# Patient Record
Sex: FEMALE | Race: WHITE | Employment: FULL TIME | URBAN - METROPOLITAN AREA
[De-identification: names, ages, dates, MRNs, and addresses within clinical notes are randomized per-mention and may not be internally consistent; named-entity substitution may affect disease eponyms.]

---

## 2019-10-30 ENCOUNTER — HOSPITAL ENCOUNTER (INPATIENT)
Age: 24
LOS: 3 days | Discharge: HOME | DRG: 866 | End: 2019-11-02
Attending: STUDENT IN AN ORGANIZED HEALTH CARE EDUCATION/TRAINING PROGRAM | Admitting: HOSPITALIST
Payer: COMMERCIAL

## 2019-10-30 ENCOUNTER — APPOINTMENT (OUTPATIENT)
Dept: GENERAL RADIOLOGY | Age: 24
DRG: 866 | End: 2019-10-30
Attending: PHYSICIAN ASSISTANT
Payer: COMMERCIAL

## 2019-10-30 ENCOUNTER — APPOINTMENT (OUTPATIENT)
Dept: ULTRASOUND IMAGING | Age: 24
DRG: 866 | End: 2019-10-30
Attending: HOSPITALIST
Payer: COMMERCIAL

## 2019-10-30 DIAGNOSIS — N17.9 AKI (ACUTE KIDNEY INJURY) (HCC): Primary | ICD-10-CM

## 2019-10-30 DIAGNOSIS — E87.1 HYPONATREMIA: ICD-10-CM

## 2019-10-30 DIAGNOSIS — R50.9 ACUTE FEBRILE ILLNESS: ICD-10-CM

## 2019-10-30 DIAGNOSIS — D69.6 THROMBOCYTOPENIA (HCC): ICD-10-CM

## 2019-10-30 PROBLEM — R94.31 PROLONGED QT INTERVAL: Status: ACTIVE | Noted: 2019-10-30

## 2019-10-30 PROBLEM — E87.6 HYPOKALEMIA: Status: ACTIVE | Noted: 2019-10-30

## 2019-10-30 PROBLEM — E83.39 HYPOPHOSPHATEMIA: Status: ACTIVE | Noted: 2019-10-30

## 2019-10-30 PROBLEM — R65.10 SIRS (SYSTEMIC INFLAMMATORY RESPONSE SYNDROME) (HCC): Status: ACTIVE | Noted: 2019-10-30

## 2019-10-30 LAB
ALBUMIN SERPL-MCNC: 3.2 G/DL (ref 3.5–5)
ALBUMIN/GLOB SERPL: 0.8 {RATIO} (ref 1.1–2.2)
ALP SERPL-CCNC: 173 U/L (ref 45–117)
ALT SERPL-CCNC: 20 U/L (ref 12–78)
AMORPH CRY URNS QL MICRO: ABNORMAL
ANION GAP SERPL CALC-SCNC: 3 MMOL/L (ref 5–15)
ANION GAP SERPL CALC-SCNC: 7 MMOL/L (ref 5–15)
APPEARANCE UR: ABNORMAL
AST SERPL-CCNC: 22 U/L (ref 15–37)
B PERT DNA SPEC QL NAA+PROBE: NOT DETECTED
BACTERIA URNS QL MICRO: NEGATIVE /HPF
BASOPHILS # BLD: 0.1 K/UL (ref 0–0.1)
BASOPHILS NFR BLD: 1 % (ref 0–1)
BILIRUB SERPL-MCNC: 0.9 MG/DL (ref 0.2–1)
BILIRUB UR QL: NEGATIVE
BUN SERPL-MCNC: 26 MG/DL (ref 6–20)
BUN SERPL-MCNC: 34 MG/DL (ref 6–20)
BUN/CREAT SERPL: 22 (ref 12–20)
BUN/CREAT SERPL: 27 (ref 12–20)
C PNEUM DNA SPEC QL NAA+PROBE: NOT DETECTED
CALCIUM SERPL-MCNC: 8.6 MG/DL (ref 8.5–10.1)
CALCIUM SERPL-MCNC: 9.7 MG/DL (ref 8.5–10.1)
CHLORIDE SERPL-SCNC: 110 MMOL/L (ref 97–108)
CHLORIDE SERPL-SCNC: 98 MMOL/L (ref 97–108)
CO2 SERPL-SCNC: 25 MMOL/L (ref 21–32)
CO2 SERPL-SCNC: 26 MMOL/L (ref 21–32)
COLOR UR: ABNORMAL
COMMENT, HOLDF: NORMAL
CREAT SERPL-MCNC: 0.97 MG/DL (ref 0.55–1.02)
CREAT SERPL-MCNC: 1.56 MG/DL (ref 0.55–1.02)
DEPRECATED S PYO AG THROAT QL EIA: NEGATIVE
DIFFERENTIAL METHOD BLD: ABNORMAL
EOSINOPHIL # BLD: 0 K/UL (ref 0–0.4)
EOSINOPHIL NFR BLD: 0 % (ref 0–7)
EPITH CASTS URNS QL MICRO: ABNORMAL /LPF
ERYTHROCYTE [DISTWIDTH] IN BLOOD BY AUTOMATED COUNT: 12.4 % (ref 11.5–14.5)
FLUAV AG NPH QL IA: NEGATIVE
FLUAV H1 2009 PAND RNA SPEC QL NAA+PROBE: NOT DETECTED
FLUAV H1 RNA SPEC QL NAA+PROBE: NOT DETECTED
FLUAV H3 RNA SPEC QL NAA+PROBE: NOT DETECTED
FLUAV SUBTYP SPEC NAA+PROBE: NOT DETECTED
FLUBV AG NOSE QL IA: NEGATIVE
FLUBV RNA SPEC QL NAA+PROBE: NOT DETECTED
GLOBULIN SER CALC-MCNC: 4.1 G/DL (ref 2–4)
GLUCOSE SERPL-MCNC: 132 MG/DL (ref 65–100)
GLUCOSE SERPL-MCNC: 171 MG/DL (ref 65–100)
GLUCOSE UR STRIP.AUTO-MCNC: NEGATIVE MG/DL
HADV DNA SPEC QL NAA+PROBE: NOT DETECTED
HAV IGM SER QL: NONREACTIVE
HBV CORE IGM SER QL: NONREACTIVE
HBV SURFACE AG SER QL: <0.1 INDEX
HBV SURFACE AG SER QL: NEGATIVE
HCOV 229E RNA SPEC QL NAA+PROBE: NOT DETECTED
HCOV HKU1 RNA SPEC QL NAA+PROBE: NOT DETECTED
HCOV NL63 RNA SPEC QL NAA+PROBE: NOT DETECTED
HCOV OC43 RNA SPEC QL NAA+PROBE: NOT DETECTED
HCT VFR BLD AUTO: 35.7 % (ref 35–47)
HCV AB SERPL QL IA: NONREACTIVE
HCV COMMENT,HCGAC: NORMAL
HETEROPH AB BLD QL IA: NEGATIVE
HGB BLD-MCNC: 11.9 G/DL (ref 11.5–16)
HGB UR QL STRIP: ABNORMAL
HIV 1+2 AB+HIV1 P24 AG SERPL QL IA: NONREACTIVE
HIV12 RESULT COMMENT, HHIVC: NORMAL
HMPV RNA SPEC QL NAA+PROBE: NOT DETECTED
HPIV1 RNA SPEC QL NAA+PROBE: NOT DETECTED
HPIV2 RNA SPEC QL NAA+PROBE: NOT DETECTED
HPIV3 RNA SPEC QL NAA+PROBE: NOT DETECTED
HPIV4 RNA SPEC QL NAA+PROBE: NOT DETECTED
IMM GRANULOCYTES # BLD AUTO: 0 K/UL
IMM GRANULOCYTES NFR BLD AUTO: 0 %
KETONES UR QL STRIP.AUTO: NEGATIVE MG/DL
LACTATE BLD-SCNC: 1.11 MMOL/L (ref 0.4–2)
LDH SERPL L TO P-CCNC: 126 U/L (ref 81–246)
LEUKOCYTE ESTERASE UR QL STRIP.AUTO: ABNORMAL
LIPASE SERPL-CCNC: 53 U/L (ref 73–393)
LYMPHOCYTES # BLD: 0.3 K/UL (ref 0.8–3.5)
LYMPHOCYTES NFR BLD: 3 % (ref 12–49)
M PNEUMO DNA SPEC QL NAA+PROBE: NOT DETECTED
MAGNESIUM SERPL-MCNC: 1.9 MG/DL (ref 1.6–2.4)
MCH RBC QN AUTO: 28.8 PG (ref 26–34)
MCHC RBC AUTO-ENTMCNC: 33.3 G/DL (ref 30–36.5)
MCV RBC AUTO: 86.4 FL (ref 80–99)
MONOCYTES # BLD: 0.8 K/UL (ref 0–1)
MONOCYTES NFR BLD: 8 % (ref 5–13)
NEUTS BAND NFR BLD MANUAL: 4 % (ref 0–6)
NEUTS SEG # BLD: 9 K/UL (ref 1.8–8)
NEUTS SEG NFR BLD: 84 % (ref 32–75)
NITRITE UR QL STRIP.AUTO: NEGATIVE
NRBC # BLD: 0 K/UL (ref 0–0.01)
NRBC BLD-RTO: 0 PER 100 WBC
PH UR STRIP: 5 [PH] (ref 5–8)
PHOSPHATE SERPL-MCNC: 0.9 MG/DL (ref 2.6–4.7)
PHOSPHATE SERPL-MCNC: 2.7 MG/DL (ref 2.6–4.7)
PLATELET # BLD AUTO: 102 K/UL (ref 150–400)
PLATELET COMMENTS,PCOM: ABNORMAL
PMV BLD AUTO: 11.1 FL (ref 8.9–12.9)
POTASSIUM SERPL-SCNC: 3.2 MMOL/L (ref 3.5–5.1)
POTASSIUM SERPL-SCNC: 4.5 MMOL/L (ref 3.5–5.1)
PROT SERPL-MCNC: 7.3 G/DL (ref 6.4–8.2)
PROT UR STRIP-MCNC: 30 MG/DL
RBC # BLD AUTO: 4.13 M/UL (ref 3.8–5.2)
RBC #/AREA URNS HPF: ABNORMAL /HPF (ref 0–5)
RBC MORPH BLD: ABNORMAL
RSV RNA SPEC QL NAA+PROBE: NOT DETECTED
RV+EV RNA SPEC QL NAA+PROBE: NOT DETECTED
SAMPLES BEING HELD,HOLD: NORMAL
SODIUM SERPL-SCNC: 130 MMOL/L (ref 136–145)
SODIUM SERPL-SCNC: 139 MMOL/L (ref 136–145)
SP GR UR REFRACTOMETRY: 1.01 (ref 1–1.03)
SP1: NORMAL
SP2: NORMAL
SP3: NORMAL
UA: UC IF INDICATED,UAUC: ABNORMAL
UROBILINOGEN UR QL STRIP.AUTO: 2 EU/DL (ref 0.2–1)
WBC # BLD AUTO: 10.2 K/UL (ref 3.6–11)
WBC URNS QL MICRO: ABNORMAL /HPF (ref 0–4)

## 2019-10-30 PROCEDURE — 84100 ASSAY OF PHOSPHORUS: CPT

## 2019-10-30 PROCEDURE — 74011250636 HC RX REV CODE- 250/636: Performed by: PHYSICIAN ASSISTANT

## 2019-10-30 PROCEDURE — 74011250637 HC RX REV CODE- 250/637: Performed by: INTERNAL MEDICINE

## 2019-10-30 PROCEDURE — 87086 URINE CULTURE/COLONY COUNT: CPT

## 2019-10-30 PROCEDURE — 87880 STREP A ASSAY W/OPTIC: CPT

## 2019-10-30 PROCEDURE — 81001 URINALYSIS AUTO W/SCOPE: CPT

## 2019-10-30 PROCEDURE — 65270000029 HC RM PRIVATE

## 2019-10-30 PROCEDURE — 0099U RESPIRATORY PANEL,PCR,NASOPHARYNGEAL: CPT

## 2019-10-30 PROCEDURE — 85397 CLOTTING FUNCT ACTIVITY: CPT

## 2019-10-30 PROCEDURE — 87389 HIV-1 AG W/HIV-1&-2 AB AG IA: CPT

## 2019-10-30 PROCEDURE — 96361 HYDRATE IV INFUSION ADD-ON: CPT

## 2019-10-30 PROCEDURE — 83735 ASSAY OF MAGNESIUM: CPT

## 2019-10-30 PROCEDURE — 86777 TOXOPLASMA ANTIBODY: CPT

## 2019-10-30 PROCEDURE — 74011250637 HC RX REV CODE- 250/637: Performed by: PHYSICIAN ASSISTANT

## 2019-10-30 PROCEDURE — 36415 COLL VENOUS BLD VENIPUNCTURE: CPT

## 2019-10-30 PROCEDURE — 87804 INFLUENZA ASSAY W/OPTIC: CPT

## 2019-10-30 PROCEDURE — 80053 COMPREHEN METABOLIC PANEL: CPT

## 2019-10-30 PROCEDURE — 86308 HETEROPHILE ANTIBODY SCREEN: CPT

## 2019-10-30 PROCEDURE — 99285 EMERGENCY DEPT VISIT HI MDM: CPT

## 2019-10-30 PROCEDURE — 96375 TX/PRO/DX INJ NEW DRUG ADDON: CPT

## 2019-10-30 PROCEDURE — 86664 EPSTEIN-BARR NUCLEAR ANTIGEN: CPT

## 2019-10-30 PROCEDURE — 74011250637 HC RX REV CODE- 250/637: Performed by: HOSPITALIST

## 2019-10-30 PROCEDURE — 74011000258 HC RX REV CODE- 258: Performed by: PHYSICIAN ASSISTANT

## 2019-10-30 PROCEDURE — 83605 ASSAY OF LACTIC ACID: CPT

## 2019-10-30 PROCEDURE — 83615 LACTATE (LD) (LDH) ENZYME: CPT

## 2019-10-30 PROCEDURE — 76705 ECHO EXAM OF ABDOMEN: CPT

## 2019-10-30 PROCEDURE — 87040 BLOOD CULTURE FOR BACTERIA: CPT

## 2019-10-30 PROCEDURE — 87070 CULTURE OTHR SPECIMN AEROBIC: CPT

## 2019-10-30 PROCEDURE — 80074 ACUTE HEPATITIS PANEL: CPT

## 2019-10-30 PROCEDURE — 74011000258 HC RX REV CODE- 258: Performed by: HOSPITALIST

## 2019-10-30 PROCEDURE — 74011250636 HC RX REV CODE- 250/636: Performed by: HOSPITALIST

## 2019-10-30 PROCEDURE — 87076 CULTURE ANAEROBE IDENT EACH: CPT

## 2019-10-30 PROCEDURE — 96374 THER/PROPH/DIAG INJ IV PUSH: CPT

## 2019-10-30 PROCEDURE — 71046 X-RAY EXAM CHEST 2 VIEWS: CPT

## 2019-10-30 PROCEDURE — 83690 ASSAY OF LIPASE: CPT

## 2019-10-30 PROCEDURE — 87185 SC STD ENZYME DETCJ PER NZM: CPT

## 2019-10-30 PROCEDURE — 74011000250 HC RX REV CODE- 250: Performed by: HOSPITALIST

## 2019-10-30 PROCEDURE — 85025 COMPLETE CBC W/AUTO DIFF WBC: CPT

## 2019-10-30 PROCEDURE — 74011250636 HC RX REV CODE- 250/636: Performed by: INTERNAL MEDICINE

## 2019-10-30 PROCEDURE — 93005 ELECTROCARDIOGRAM TRACING: CPT

## 2019-10-30 PROCEDURE — 87536 HIV-1 QUANT&REVRSE TRNSCRPJ: CPT

## 2019-10-30 RX ORDER — KETOROLAC TROMETHAMINE 30 MG/ML
15 INJECTION, SOLUTION INTRAMUSCULAR; INTRAVENOUS
Status: COMPLETED | OUTPATIENT
Start: 2019-10-30 | End: 2019-10-30

## 2019-10-30 RX ORDER — ACETAMINOPHEN 500 MG
1000 TABLET ORAL
Status: COMPLETED | OUTPATIENT
Start: 2019-10-30 | End: 2019-10-30

## 2019-10-30 RX ORDER — OXYCODONE HYDROCHLORIDE 5 MG/1
5 TABLET ORAL
Status: DISCONTINUED | OUTPATIENT
Start: 2019-10-30 | End: 2019-11-01

## 2019-10-30 RX ORDER — METHYLPREDNISOLONE 4 MG/1
TABLET ORAL
COMMUNITY
End: 2019-11-02

## 2019-10-30 RX ORDER — SODIUM CHLORIDE 0.9 % (FLUSH) 0.9 %
5-10 SYRINGE (ML) INJECTION AS NEEDED
Status: DISCONTINUED | OUTPATIENT
Start: 2019-10-30 | End: 2019-11-02 | Stop reason: HOSPADM

## 2019-10-30 RX ORDER — IBUPROFEN 200 MG
200 TABLET ORAL
COMMUNITY
End: 2019-11-14

## 2019-10-30 RX ORDER — ACETAMINOPHEN 325 MG/1
650 TABLET ORAL
Status: DISCONTINUED | OUTPATIENT
Start: 2019-10-30 | End: 2019-11-02 | Stop reason: HOSPADM

## 2019-10-30 RX ORDER — TRAMADOL HYDROCHLORIDE 50 MG/1
50 TABLET ORAL
COMMUNITY
End: 2019-11-02

## 2019-10-30 RX ORDER — SODIUM CHLORIDE 0.9 % (FLUSH) 0.9 %
5-40 SYRINGE (ML) INJECTION EVERY 8 HOURS
Status: DISCONTINUED | OUTPATIENT
Start: 2019-10-30 | End: 2019-11-02 | Stop reason: HOSPADM

## 2019-10-30 RX ORDER — ONDANSETRON 2 MG/ML
4 INJECTION INTRAMUSCULAR; INTRAVENOUS
Status: DISCONTINUED | OUTPATIENT
Start: 2019-10-30 | End: 2019-10-30

## 2019-10-30 RX ORDER — SODIUM CHLORIDE 9 MG/ML
30 INJECTION, SOLUTION INTRAVENOUS ONCE
Status: DISCONTINUED | OUTPATIENT
Start: 2019-10-30 | End: 2019-10-30

## 2019-10-30 RX ORDER — SODIUM CHLORIDE 0.9 % (FLUSH) 0.9 %
5-40 SYRINGE (ML) INJECTION AS NEEDED
Status: DISCONTINUED | OUTPATIENT
Start: 2019-10-30 | End: 2019-11-02 | Stop reason: HOSPADM

## 2019-10-30 RX ORDER — OSELTAMIVIR PHOSPHATE 75 MG/1
75 CAPSULE ORAL
COMMUNITY
End: 2019-11-02

## 2019-10-30 RX ORDER — SODIUM CHLORIDE 9 MG/ML
150 INJECTION, SOLUTION INTRAVENOUS CONTINUOUS
Status: DISCONTINUED | OUTPATIENT
Start: 2019-10-30 | End: 2019-11-01

## 2019-10-30 RX ORDER — BUTALBITAL, ACETAMINOPHEN AND CAFFEINE 50; 325; 40 MG/1; MG/1; MG/1
1 TABLET ORAL ONCE
Status: COMPLETED | OUTPATIENT
Start: 2019-10-31 | End: 2019-10-31

## 2019-10-30 RX ORDER — NORETHINDRONE ACETATE AND ETHINYL ESTRADIOL 1MG-20(21)
1 KIT ORAL DAILY
COMMUNITY
End: 2019-11-14

## 2019-10-30 RX ORDER — POTASSIUM CHLORIDE 750 MG/1
40 TABLET, FILM COATED, EXTENDED RELEASE ORAL
Status: COMPLETED | OUTPATIENT
Start: 2019-10-30 | End: 2019-10-30

## 2019-10-30 RX ORDER — SODIUM CHLORIDE 9 MG/ML
700 INJECTION, SOLUTION INTRAVENOUS ONCE
Status: COMPLETED | OUTPATIENT
Start: 2019-10-30 | End: 2019-10-30

## 2019-10-30 RX ADMIN — CEFTRIAXONE 1 G: 1 INJECTION, POWDER, FOR SOLUTION INTRAMUSCULAR; INTRAVENOUS at 03:46

## 2019-10-30 RX ADMIN — PROMETHAZINE HYDROCHLORIDE 25 MG: 25 INJECTION INTRAMUSCULAR; INTRAVENOUS at 01:09

## 2019-10-30 RX ADMIN — OXYCODONE HYDROCHLORIDE 5 MG: 5 TABLET ORAL at 17:03

## 2019-10-30 RX ADMIN — SODIUM CHLORIDE 700 ML/HR: 900 INJECTION, SOLUTION INTRAVENOUS at 02:15

## 2019-10-30 RX ADMIN — ACETAMINOPHEN 1000 MG: 500 TABLET ORAL at 00:42

## 2019-10-30 RX ADMIN — ACETAMINOPHEN 650 MG: 325 TABLET, FILM COATED ORAL at 08:36

## 2019-10-30 RX ADMIN — ACETAMINOPHEN 650 MG: 325 TABLET, FILM COATED ORAL at 12:24

## 2019-10-30 RX ADMIN — OXYCODONE HYDROCHLORIDE 5 MG: 5 TABLET ORAL at 21:43

## 2019-10-30 RX ADMIN — POTASSIUM CHLORIDE 40 MEQ: 750 TABLET, FILM COATED, EXTENDED RELEASE ORAL at 03:46

## 2019-10-30 RX ADMIN — CEFEPIME HYDROCHLORIDE 2 G: 2 INJECTION, POWDER, FOR SOLUTION INTRAVENOUS at 12:16

## 2019-10-30 RX ADMIN — Medication 10 ML: at 21:43

## 2019-10-30 RX ADMIN — CEFEPIME HYDROCHLORIDE 2 G: 2 INJECTION, POWDER, FOR SOLUTION INTRAVENOUS at 22:27

## 2019-10-30 RX ADMIN — ACETAMINOPHEN 650 MG: 325 TABLET, FILM COATED ORAL at 22:26

## 2019-10-30 RX ADMIN — SODIUM CHLORIDE: 900 INJECTION, SOLUTION INTRAVENOUS at 04:19

## 2019-10-30 RX ADMIN — DIBASIC SODIUM PHOSPHATE, MONOBASIC POTASSIUM PHOSPHATE AND MONOBASIC SODIUM PHOSPHATE 2 TABLET: 852; 155; 130 TABLET ORAL at 04:20

## 2019-10-30 RX ADMIN — SODIUM CHLORIDE 1000 ML: 900 INJECTION, SOLUTION INTRAVENOUS at 01:10

## 2019-10-30 RX ADMIN — SODIUM CHLORIDE 150 ML/HR: 900 INJECTION, SOLUTION INTRAVENOUS at 11:05

## 2019-10-30 RX ADMIN — SODIUM CHLORIDE 125 ML/HR: 900 INJECTION, SOLUTION INTRAVENOUS at 03:46

## 2019-10-30 RX ADMIN — KETOROLAC TROMETHAMINE 15 MG: 30 INJECTION, SOLUTION INTRAMUSCULAR at 02:15

## 2019-10-30 NOTE — CONSULTS
[facility] at [address], [address] [place]: [phone]  F: [phone]    Reason for Visit:   Wiley Emerson is a 30 y.o. female who is seen in consultation at the request of Dr. Idris Shepherd for evaluation of Schistzocyte. Treatment History:   · None    History of Present Illness: The pt is a very pleasant 29 yo who presented to the ER with a 5 day Hx of fever, chills, feeling bad, and not eating. She is complaining of some right upper quadrant pain as well. Pt has not been out of the country and no tick exposure, she did go camping 2 months ago. No one else is sick. The smear was read as having large platelet and schistocytes. I reviewed the side from the lab and saw one schistocyte in 40 HPF. She has normal RBC morphology with some polikocytosis. The Platelets are decreased in number but normal.  The WBC's have prominent granules with vacuoles and occasional Dohle bodies. History reviewed. No pertinent past medical history. History reviewed. No pertinent surgical history. Social History     Tobacco Use    Smoking status: Never Smoker   Substance Use Topics    Alcohol use: Not on file      No family history on file. Current Facility-Administered Medications   Medication Dose Route Frequency    sodium chloride (NS) flush 5-10 mL  5-10 mL IntraVENous PRN    cefTRIAXone (ROCEPHIN) 1 g in 0.9% sodium chloride (MBP/ADV) 50 mL  1 g IntraVENous NOW    sodium chloride (NS) flush 5-40 mL  5-40 mL IntraVENous Q8H    sodium chloride (NS) flush 5-40 mL  5-40 mL IntraVENous PRN    acetaminophen (TYLENOL) tablet 650 mg  650 mg Oral Q4H PRN    potassium phosphate 30 mmol in 0.9% sodium chloride 250 mL infusion   IntraVENous ONCE    0.9% sodium chloride infusion  125 mL/hr IntraVENous CONTINUOUS    phosphorus (K PHOS NEUTRAL) 250 mg tablet 2 Tab  2 Tab Oral NOW     No current outpatient medications on file.       Allergies   Allergen Reactions    Sulfa (Sulfonamide Antibiotics) Hives        Review of Systems: A complete review of systems was obtained, negative except as described above. Physical Exam:     Visit Vitals  BP 96/63   Pulse 87   Temp 99.5 °F (37.5 °C)   Resp 24   Ht 5' 2\" (1.575 m)   Wt 126 lb (57.2 kg)   LMP 10/09/2019   SpO2 99%   BMI 23.05 kg/m²     ECOG PS: 2  General: she feels miserable  Eyes: PERRLA, anicteric sclerae  HENT: Atraumatic, OP clear  Neck: Supple  Lymphatic: No cervical, or supraclavicular adenopathy  Respiratory: CTAB, normal respiratory effort  CV: Normal rate, regular rhythm, no murmurs, no peripheral edema  GI: Soft, tender RUQ , nondistended, no masses, no hepatomegaly, no splenomegaly  MS: Digits without clubbing or cyanosis. Skin: No rashes, ecchymoses, or petechiae. Normal temperature, turgor, and texture. Psych: Alert, oriented, appropriate affect, normal judgment/insight    Results:     Lab Results   Component Value Date/Time    WBC 10.2 10/30/2019 12:46 AM    HGB 11.9 10/30/2019 12:46 AM    HCT 35.7 10/30/2019 12:46 AM    PLATELET 576 (L) 62/06/4249 12:46 AM    MCV 86.4 10/30/2019 12:46 AM    ABS. NEUTROPHILS 9.0 (H) 10/30/2019 12:46 AM     Lab Results   Component Value Date/Time    Sodium 130 (L) 10/30/2019 12:46 AM    Potassium 3.2 (L) 10/30/2019 12:46 AM    Chloride 98 10/30/2019 12:46 AM    CO2 25 10/30/2019 12:46 AM    Glucose 132 (H) 10/30/2019 12:46 AM    BUN 34 (H) 10/30/2019 12:46 AM    Creatinine 1.56 (H) 10/30/2019 12:46 AM    GFR est AA 49 (L) 10/30/2019 12:46 AM    GFR est non-AA 40 (L) 10/30/2019 12:46 AM    Calcium 9.7 10/30/2019 12:46 AM     Lab Results   Component Value Date/Time    Bilirubin, total 0.9 10/30/2019 12:46 AM    ALT (SGPT) 20 10/30/2019 12:46 AM    AST (SGOT) 22 10/30/2019 12:46 AM    Alk.  phosphatase 173 (H) 10/30/2019 12:46 AM    Protein, total 7.3 10/30/2019 12:46 AM    Albumin 3.2 (L) 10/30/2019 12:46 AM    Globulin 4.1 (H) 10/30/2019 12:46 AM         Records reviewed and summarized above. CXR normal 10/30/19     Radiology report(s) reviewed above. Assessment:   1) probable bacterial infection with RUQ pain    2) no significant schistocytes but with toxic granules in the [facility] with vacuoles and Dohle bodies the latter suggest infection    Plan:     · It is reasonable to check an LDH and us TS13 but the probability of TTP is very low. I appreciate the opportunity to participate in Ms. Calli Kenney's care.     Signed By: Juventino Kathleen MD

## 2019-10-30 NOTE — H&P
HISTORY AND PHYSICAL      PCP: None  History source: the patient      CC: fever and malaise      HPI: 30 y.o lady with no known medical problems who presents with fever and malaise. Symptoms began five days ago. She developed a sore throat, went to an urgent care clinic, had a negative rapid flu and strep test, but was prescribed Tamiflu due to high concerns for the flu. Her symptoms worsened in severity and she began having a headache and body aches. She went to another ED three days ago and was told she may have mono, and was prescribed a methylprednisolone taper. This greatly improved her symptoms, but yesterday she started feeling poorly again. She's been having fevers up to 102, anorexia, nausea. Her headache has resolved since she's come to the ED, and she denies neck stiffness or photophobia. She denies rash, cough, diarrhea, dysuria, recent travel       PMH/PSH:  History reviewed. No pertinent past medical history. History reviewed. No pertinent surgical history. Home meds: On birth control - specific drug? Allergies: Allergies   Allergen Reactions    Sulfa (Sulfonamide Antibiotics) Hives       FH:  No pertinent family history    SH:  Denies tobacco, alcohol, or illicit drug use. Has multiple pets, dogs. ROS: A comprehensive review of systems was negative except for that written in the HPI.       PHYSICAL EXAM:  Visit Vitals  BP 90/54 (BP 1 Location: Right arm, BP Patient Position: At rest)   Pulse 95   Temp 99.5 °F (37.5 °C)   Resp 20   Ht 5' 2\" (1.575 m)   Wt 57.2 kg (126 lb)   LMP 10/09/2019   SpO2 98%   BMI 23.05 kg/m²       Gen: appears ill  HEENT: anicteric sclerae, there is mild pharyngeal erythema, MM moist  Neck: supple, trachea midline, there is tender anterior cervical lymphadenopathy  Heart: RRR, no MRG, no JVD, no peripheral edema  Lungs: CTA b/l, non-labored respirations  Abd: soft, mild RUQ tenderness, ND, BS+  Extr: warm  Skin: dry, no rash  Neuro: CN II-XII grossly intact, normal speech, moves all extremities  Psych: normal mood, appropriate affect      Labs/Imaging:  Recent Results (from the past 24 hour(s))   EKG, 12 LEAD, INITIAL    Collection Time: 10/30/19 12:27 AM   Result Value Ref Range    Ventricular Rate 115 BPM    Atrial Rate 115 BPM    P-R Interval 104 ms    QRS Duration 72 ms    Q-T Interval 456 ms    QTC Calculation (Bezet) 630 ms    Calculated R Axis 29 degrees    Calculated T Axis 59 degrees    Diagnosis       Sinus tachycardia with short ME  Nonspecific T wave abnormality  Prolonged QT  No previous ECGs available     POC LACTIC ACID    Collection Time: 10/30/19 12:38 AM   Result Value Ref Range    Lactic Acid (POC) 1.11 0.40 - 2.00 mmol/L   INFLUENZA A & B AG (RAPID TEST)    Collection Time: 10/30/19 12:40 AM   Result Value Ref Range    Influenza A Antigen NEGATIVE  NEG      Influenza B Antigen NEGATIVE  NEG     MONONUCLEOSIS SCREEN    Collection Time: 10/30/19 12:40 AM   Result Value Ref Range    Mononucleosis screen NEGATIVE  NEG     MAGNESIUM    Collection Time: 10/30/19 12:46 AM   Result Value Ref Range    Magnesium 1.9 1.6 - 2.4 mg/dL   PHOSPHORUS    Collection Time: 10/30/19 12:46 AM   Result Value Ref Range    Phosphorus 0.9 (LL) 2.6 - 4.7 MG/DL   SAMPLES BEING HELD    Collection Time: 10/30/19 12:46 AM   Result Value Ref Range    SAMPLES BEING HELD 1RED,1BLUE,1RED     COMMENT        Add-on orders for these samples will be processed based on acceptable specimen integrity and analyte stability, which may vary by analyte.    METABOLIC PANEL, COMPREHENSIVE    Collection Time: 10/30/19 12:46 AM   Result Value Ref Range    Sodium 130 (L) 136 - 145 mmol/L    Potassium 3.2 (L) 3.5 - 5.1 mmol/L    Chloride 98 97 - 108 mmol/L    CO2 25 21 - 32 mmol/L    Anion gap 7 5 - 15 mmol/L    Glucose 132 (H) 65 - 100 mg/dL    BUN 34 (H) 6 - 20 MG/DL    Creatinine 1.56 (H) 0.55 - 1.02 MG/DL    BUN/Creatinine ratio 22 (H) 12 - 20      GFR est AA 49 (L) >60 ml/min/1.73m2    GFR est non-AA 40 (L) >60 ml/min/1.73m2    Calcium 9.7 8.5 - 10.1 MG/DL    Bilirubin, total 0.9 0.2 - 1.0 MG/DL    ALT (SGPT) 20 12 - 78 U/L    AST (SGOT) 22 15 - 37 U/L    Alk. phosphatase 173 (H) 45 - 117 U/L    Protein, total 7.3 6.4 - 8.2 g/dL    Albumin 3.2 (L) 3.5 - 5.0 g/dL    Globulin 4.1 (H) 2.0 - 4.0 g/dL    A-G Ratio 0.8 (L) 1.1 - 2.2     CBC WITH AUTOMATED DIFF    Collection Time: 10/30/19 12:46 AM   Result Value Ref Range    WBC 10.2 3.6 - 11.0 K/uL    RBC 4.13 3.80 - 5.20 M/uL    HGB 11.9 11.5 - 16.0 g/dL    HCT 35.7 35.0 - 47.0 %    MCV 86.4 80.0 - 99.0 FL    MCH 28.8 26.0 - 34.0 PG    MCHC 33.3 30.0 - 36.5 g/dL    RDW 12.4 11.5 - 14.5 %    PLATELET 685 (L) 482 - 400 K/uL    MPV 11.1 8.9 - 12.9 FL    NRBC 0.0 0  WBC    ABSOLUTE NRBC 0.00 0.00 - 0.01 K/uL    NEUTROPHILS 84 (H) 32 - 75 %    BAND NEUTROPHILS 4 0 - 6 %    LYMPHOCYTES 3 (L) 12 - 49 %    MONOCYTES 8 5 - 13 %    EOSINOPHILS 0 0 - 7 %    BASOPHILS 1 0 - 1 %    IMMATURE GRANULOCYTES 0 %    ABS. NEUTROPHILS 9.0 (H) 1.8 - 8.0 K/UL    ABS. LYMPHOCYTES 0.3 (L) 0.8 - 3.5 K/UL    ABS. MONOCYTES 0.8 0.0 - 1.0 K/UL    ABS. EOSINOPHILS 0.0 0.0 - 0.4 K/UL    ABS. BASOPHILS 0.1 0.0 - 0.1 K/UL    ABS. IMM. GRANS. 0.0 K/UL    DF MANUAL      PLATELET COMMENTS Large Platelets      RBC COMMENTS SCHISTOCYTES  1+       LIPASE    Collection Time: 10/30/19 12:46 AM   Result Value Ref Range    Lipase 53 (L) 73 - 393 U/L       Recent Labs     10/30/19  0046   WBC 10.2   HGB 11.9   HCT 35.7   *     Recent Labs     10/30/19  0046   *   K 3.2*   CL 98   CO2 25   BUN 34*   CREA 1.56*   *   CA 9.7   MG 1.9   PHOS 0.9*     Recent Labs     10/30/19  0046   SGOT 22   ALT 20   *   TBILI 0.9   TP 7.3   ALB 3.2*   GLOB 4.1*   LPSE 53*       No results for input(s): CPK, CKNDX, TROIQ in the last 72 hours. No lab exists for component: CPKMB    No results for input(s): INR, PTP, APTT, INREXT in the last 72 hours.      No results for input(s): PH, PCO2, PO2 in the last 72 hours. Xr Chest Pa Lat    Result Date: 10/30/2019  IMPRESSION: No acute process. Assessment & Plan:     SIRS of unclear etiology: concerning for sepsis. Recent URI, per the pt rapid strep testing was negative. Exam notable for tender anterior cervical LAD. PBS notes schistocytes but TTP unlikely (see below)  -admit to intermediate care  -IV fluids  -blood cultures, UA pending  -respiratory viral PCR, throat culture with strep A screen  -mono screen and rapid flu negative  -check HIV Ab and PCR - obtained the patient's verbal consent  -RUQ US given RUQ pain; consider CT of the abdomen  -received a dose of IV ceftriaxone in the ED.  Will place on IV cefepime for now until further tests result    Thrombocytopenia: schistocytes noted on smear  -check LDH, [id number] activity  -d/w on-call heme/onc Dr. Luis F Avila who evaluated the smear - TTP unlikely, schistocytes minimal    GISELLA: pt endorses frequent NSAID use over the past few days  -monitor w/ IV fluids    Hypokalemia:  -replete    Hypophosphatemia:  -replete    Hyponatremia: likely due to volume depletion    Prolonged QTc incidentally noted    DVT ppx: SCDs  Code status: full  Disposition: home when ready    Signed By: Jack Sahni MD     October 30, 2019

## 2019-10-30 NOTE — PROGRESS NOTES
Admission Medication Reconciliation:    Comments/Recommendations:     Updated PTA meds/reviewed patient's allergies. Medication history obtained from the patient and her mother. Allergies: Reviewed. Medication changes (since last review): Added  Junel (birth control)  Tamiflu x 1 dose ~10/25/19 - subsequently stopped by new provider seen  Medrol dose pack - on Day 4  Tramadol PRN  Advil PRN    Please call  with any immediate questions regarding this medication reconciliation, or the main inpatient pharmacy at [facility] to be directed to the clinical pharmacist covering the patient's care after admitted. Thank you for allowing me to participate in this patient's care. Magda Aquino, Pharmacist     [facility] data reflects medications filled and processed through the patient's insurance, however   this data does NOT capture whether the medication was picked up or is currently being taken by the patient. Prior to Admission Medications:   Prior to Admission Medications   Prescriptions Last Dose Informant Patient Reported? Taking?   ibuprofen (ADVIL) 200 mg tablet 10/29/2019 at Unknown time  Yes Yes   Sig: Take 200 mg by mouth every six (6) hours as needed for Pain. methylPREDNISolone (MEDROL, SAHIL,) 4 mg tablet 10/29/2019 at Unknown time  Yes Yes   Sig: Specific Days and Specific Times. (Started 6 day Medrol Dose Pack ~ 10/27/19?)   norethindrone-ethinyl estradiol (JUNEL FE , ,) 1 mg-20 mcg (21)/75 mg (7) tab 10/29/2019 at Unknown time  Yes Yes   Sig: Take 1 Tab by mouth daily. oseltamivir (TAMIFLU) 75 mg capsule 10/25/2019  Yes Yes   Si mg. Took 1 dose ~10/25/19?   traMADol (ULTRAM) 50 mg tablet 10/29/2019 at Unknown time  Yes Yes   Sig: Take 50 mg by mouth every six (6) hours as needed for Pain.       Facility-Administered Medications: None       Allergies:  Sulfa (sulfonamide antibiotics)  Chief Complaint for this Admission:    Chief Complaint   Patient presents with Flu Like Symptoms    Sore Throat    Back Pain     Significant PMH/Disease States: History reviewed. No pertinent past medical history.

## 2019-10-30 NOTE — ED PROVIDER NOTES
This is a 23-year-old  female previously healthy, presenting to the emergency department with complaint of a 3 to 4-day history of generalized body aches, fatigue, fever and initially sore throat. She was seen on day 1 of illness at an urgent care facility and had a negative strep and flu test.  She was started presumptively on Tamiflu for clinical flu. She then progressed to feel worse the next day and was seen at an outside ED and was prescribed steroids for presumed monopolar she reports in the past 2 days has started to have associated nausea, vomiting, tonight developed head pressure localized to the back part of the head, bilateral ear pressure; rt>lt, lower back pain, and poor appetite. She has been medicating at home with ibuprofen, last dose at 9 PM, 800 mg p.o. she also tried taking Zofran but vomited shortly after taking the medicine. She reports drinking plenty of fluids. She denies any significant solid intake. She denies any ill contacts with similar. She works at a [employer] center. She denies any recent travel. No associated chest pain, shortness of breath, runny nose, sneezing, abdominal pain, diarrhea or urinary complaint. History reviewed. No pertinent past medical history. History reviewed. No pertinent surgical history. No family history on file.     Social History     Socioeconomic History    Marital status: SINGLE     Spouse name: Not on file    Number of children: Not on file    Years of education: Not on file    Highest education level: Not on file   Occupational History    Not on file   Social Needs    Financial resource strain: Not on file    Food insecurity:     Worry: Not on file     Inability: Not on file    Transportation needs:     Medical: Not on file     Non-medical: Not on file   Tobacco Use    Smoking status: Never Smoker   Substance and Sexual Activity    Alcohol use: Not on file    Drug use: Not on file    Sexual activity: Not on file Lifestyle    Physical activity:     Days per week: Not on file     Minutes per session: Not on file    Stress: Not on file   Relationships    Social connections:     Talks on phone: Not on file     Gets together: Not on file     Attends [employer] service: Not on file     Active member of club or organization: Not on file     Attends meetings of clubs or organizations: Not on file     Relationship status: Not on file    Intimate partner violence:     Fear of current or ex partner: Not on file     Emotionally abused: Not on file     Physically abused: Not on file     Forced sexual activity: Not on file   Other Topics Concern    Not on file   Social History Narrative    Not on file         ALLERGIES: Sulfa (sulfonamide antibiotics)    Review of Systems   Constitutional: Positive for activity change, appetite change, chills and fever. HENT: Positive for ear discharge (pressure) and sore throat. Negative for congestion, rhinorrhea and sneezing. Respiratory: Positive for cough. Gastrointestinal: Positive for nausea. Musculoskeletal: Positive for back pain. Neurological: Positive for dizziness and headaches. Vitals:    10/30/19 0008 10/30/19 0031 10/30/19 0045 10/30/19 0050   BP: 105/68 113/67 99/59    Pulse: (!) 152  (!) 112    Resp: 18  18    Temp: (!) 102.2 °F (39 °C)      SpO2: 96% 97% 97%    Weight:    57.2 kg (126 lb)   Height:    5' 2\" (1.575 m)            Physical Exam   Constitutional: She is oriented to person, place, and time. She appears well-developed and well-nourished. No distress. Mildly ill appearing  female in NAD   HENT:   Head: Normocephalic and atraumatic. Right Ear: Tympanic membrane and ear canal normal. No drainage or swelling. Left Ear: Tympanic membrane, external ear and ear canal normal. No drainage or swelling. Mouth/Throat: Uvula is midline and mucous membranes are normal. No oral lesions. No uvula swelling.  Posterior oropharyngeal edema and posterior oropharyngeal erythema present. No oropharyngeal exudate or tonsillar abscesses. Tonsils are 3+ on the right. Tonsils are 3+ on the left. No tonsillar exudate. Eyes: Pupils are equal, round, and reactive to light. Conjunctivae are normal.   Neck: Normal range of motion. Neck supple. No meningeal irritation   Cardiovascular: Normal rate, regular rhythm and normal heart sounds. Pulmonary/Chest: Effort normal. No respiratory distress. She has no wheezes. Abdominal: Soft. Bowel sounds are normal. She exhibits no distension. There is no tenderness. There is no rebound. Musculoskeletal: Normal range of motion. Neurological: She is alert and oriented to person, place, and time. Skin: Skin is warm and dry. No ecchymosis, no laceration and no lesion noted. Nursing note and vitals reviewed. MDM  Number of Diagnoses or Management Options  Diagnosis management comments: 12-year-old  female with complaint of continued fatigue, decreased appetite, headache, sore throat, nausea and back pain. She appears mildly ill on exam.  Mild oropharyngeal erythema without uvular shift. Her neck is supple without any evidence of meningeal irritation on exam.  Patient is noted to be febrile with associated tachycardia. Concern for possible clinical flu versus pneumonia versus pyelonephritis versus mononucleosis amongst others. Plan  EKG  Troponin  CBC  CMP  Lipase  Ua  Magnesium  Phosphorus  IV fluid bolus  Phenergan  Tylenol  Monitor       Amount and/or Complexity of Data Reviewed  Clinical lab tests: ordered and reviewed  Tests in the radiology section of CPT®: ordered and reviewed  Independent visualization of images, tracings, or specimens: yes           Procedures          Progress note      EKG interpretation: (  Rhythm: sinus tachycardia; and regular . Rate (approx.): 115; Axis: normal; P wave: prolonged; QRS interval: normal ; ST/T wave: normal; in  Leads.  [facility]      Conferred with  Puja Hopkins, hospitalist via [employer]. He agrees to eval patient for admission.  [facility] [place]

## 2019-10-30 NOTE — PROGRESS NOTES
Hospitalist Progress Note  Fannin Cockayne, MD  Answering service: [phone] from in house phone        Date of Service:  10/30/2019  NAME:  Eloy Silva  :  1995  MRN:  [id number]      Admission Summary:   Admitted with 1 week of fever,malaise. Sore throat    Interval history / Subjective:       10/30. CT suggest hepatosplenomegally. Gallstone and sludge without obst  surg to comment  By end of day, hep screen neg, HIV screen neg. EBV viral studies pending. Pt was mono neg PTA      Assessment & Plan:     Viral illness, likely EBV pendng studies with sore thoart, hepatosplenomegally, fever,   Gall stone/sludge w/o obstu for hida scan  Code status: full   DVT prophylaxis: Lomená 1965 discussed with: Patient/Family and Nurse  Disposition: Home w/Family and TBD     Hospital Problems  Never Reviewed          Codes Class Noted POA    SIRS (systemic inflammatory response syndrome) ([facility] ICD-10-CM: R65.10  ICD-9-CM: 995.90  10/30/2019 Yes        Prolonged QT interval ICD-10-CM: R94.31  ICD-9-CM: 794.31  10/30/2019 Yes        GISELLA (acute kidney injury) ([facility] ICD-10-CM: N17.9  ICD-9-CM: 584.9  10/30/2019 Yes        Thrombocytopenia ([facility] ICD-10-CM: D69.6  ICD-9-CM: 287.5  10/30/2019 Yes        Hypokalemia ICD-10-CM: E87.6  ICD-9-CM: 276.8  10/30/2019 Yes        Hypophosphatemia ICD-10-CM: E83.39  ICD-9-CM: 275.3  10/30/2019 Yes        Hyponatremia ICD-10-CM: E87.1  ICD-9-CM: 276.1  10/30/2019 Yes                Review of Systems:   Pertinent items are noted in HPI. Vital Signs:    Last 24hrs VS reviewed since prior progress note.  Most recent are:  Visit Vitals  BP 97/58   Pulse 72   Temp 98.3 °F (36.8 °C)   Resp 16   Ht 5' 2\" (1.575 m)   Wt 57.2 kg (126 lb)   SpO2 100%   BMI 23.05 kg/m²       No intake or output data in the 24 hours ending 10/30/19 1033     Physical Examination:             Constitutional:  No acute distress, cooperative, pleasant    ENT:  tender submandibulare area ;else Oral mucous moist, oropharynx benign. And TM's clear    Resp:  CTA bilaterally. No wheezing/rhonchi/rales. No accessory muscle use   CV:  Regular rhythm, normal rate, no murmurs, gallops, rubs    GI:  Soft, non distended, upper rt /left  Tender and c/c hepatosplenomegally . normoactive bowel sounds,      Musculoskeletal:  No edema, warm, 2+ pulses throughout    Neurologic:  Moves all extremities. AAOx3, CN II-XII reviewed     Psych:  Good insight, Not anxious nor agitated. Skin:  Good turgor, no rashes or ulcers       Data Review:    Review and/or order of clinical lab test      Labs:     Recent Labs     10/30/19  0046   WBC 10.2   HGB 11.9   HCT 35.7   *     Recent Labs     10/30/19  0046   *   K 3.2*   CL 98   CO2 25   BUN 34*   CREA 1.56*   *   CA 9.7   MG 1.9   PHOS 0.9*     Recent Labs     10/30/19  0046   SGOT 22   ALT 20   *   TBILI 0.9   TP 7.3   ALB 3.2*   GLOB 4.1*   LPSE 53*     No results for input(s): INR, PTP, APTT, INREXT in the last 72 hours. No results for input(s): FE, TIBC, PSAT, FERR in the last 72 hours. No results found for: FOL, RBCF   No results for input(s): PH, PCO2, PO2 in the last 72 hours. No results for input(s): CPK, CKNDX, TROIQ in the last 72 hours.     No lab exists for component: CPKMB  No results found for: CHOL, CHOLX, CHLST, CHOLV, HDL, HDLP, LDL, LDLC, DLDLP, TGLX, TRIGL, TRIGP, CHHD, CHHDX  No results found for: GLUCPOC  Lab Results   Component Value Date/Time    Color DARK YELLOW 10/30/2019 06:37 AM    Appearance TURBID (A) 10/30/2019 06:37 AM    Specific gravity 1.010 10/30/2019 06:37 AM    pH (UA) 5.0 10/30/2019 06:37 AM    Protein 30 (A) 10/30/2019 06:37 AM    Glucose NEGATIVE  10/30/2019 06:37 AM    Ketone NEGATIVE  10/30/2019 06:37 AM    Bilirubin NEGATIVE  10/30/2019 06:37 AM    Urobilinogen 2.0 (H) 10/30/2019 06:37 AM    Nitrites NEGATIVE  10/30/2019 06:37 AM    Leukocyte Esterase TRACE (A) 10/30/2019 06:37 AM    Epithelial cells MODERATE (A) 10/30/2019 06:37 AM    Bacteria NEGATIVE  10/30/2019 06:37 AM    WBC 5-10 10/30/2019 06:37 AM    RBC 0-5 10/30/2019 06:37 AM         Medications Reviewed:     Current Facility-Administered Medications   Medication Dose Route Frequency    sodium chloride (NS) flush 5-10 mL  5-10 mL IntraVENous PRN    sodium chloride (NS) flush 5-40 mL  5-40 mL IntraVENous Q8H    sodium chloride (NS) flush 5-40 mL  5-40 mL IntraVENous PRN    acetaminophen (TYLENOL) tablet 650 mg  650 mg Oral Q4H PRN    0.9% sodium chloride infusion  150 mL/hr IntraVENous CONTINUOUS    cefepime (MAXIPIME) 2 g in 0.9% sodium chloride (MBP/ADV) 100 mL  2 g IntraVENous Q12H     Current Outpatient Medications   Medication Sig    norethindrone-ethinyl estradiol (JUNEL FE 1/20, 28,) 1 mg-20 mcg (21)/75 mg (7) tab Take 1 Tab by mouth daily.  methylPREDNISolone (MEDROL, SAHIL,) 4 mg tablet Specific Days and Specific Times. (Started 6 day Medrol Dose Pack ~ 10/27/19?)    oseltamivir (TAMIFLU) 75 mg capsule 75 mg. Took 1 dose ~10/25/19?  traMADol (ULTRAM) 50 mg tablet Take 50 mg by mouth every six (6) hours as needed for Pain.     ibuprofen (ADVIL) 200 mg tablet Take 200 mg by mouth every six (6) hours as needed for Pain.     ______________________________________________________________________  EXPECTED LENGTH OF STAY: - - -  ACTUAL LENGTH OF STAY:          Luis Alfredo Chacko MD

## 2019-10-30 NOTE — PROGRESS NOTES
Ivelisse Gambino    Now pt to me; seen on am rounds. Lactic last 1 range, pt not septic on rounds.  Down grade to medical.     Full note to follow     Shahram William MD 10/30/2019

## 2019-10-30 NOTE — ROUTINE PROCESS
TRANSFER - OUT REPORT: 
 
Verbal report given to PABLO Cherry (name) on Nehemiah Marx  being transferred to [address] (unit) for routine progression of care Report consisted of patients Situation, Background, Assessment and  
Recommendations(SBAR). Information from the following report(s) SBAR, Kardex, ED Summary, Intake/Output, MAR and Recent Results was reviewed with the receiving nurse. Lines:  
Peripheral IV 10/30/19 Left Antecubital (Active) Site Assessment Clean, dry, & intact 10/30/2019  9:10 AM  
Phlebitis Assessment 0 10/30/2019  9:10 AM  
Infiltration Assessment 0 10/30/2019  9:10 AM  
Dressing Status Clean, dry, & intact 10/30/2019  9:10 AM  
Dressing Type Tape;Transparent 10/30/2019  9:10 AM  
Hub Color/Line Status Pink 10/30/2019  9:10 AM  
Action Taken Blood drawn 10/30/2019 12:37 AM  
   
Peripheral IV 10/30/19 Right Forearm (Active) Site Assessment Clean, dry, & intact 10/30/2019  9:10 AM  
Phlebitis Assessment 0 10/30/2019  9:10 AM  
Infiltration Assessment 0 10/30/2019  9:10 AM  
Dressing Status Clean, dry, & intact 10/30/2019  9:10 AM  
Dressing Type Transparent;Tape 10/30/2019  9:10 AM  
Hub Color/Line Status Pink 10/30/2019  9:10 AM  
Action Taken Blood drawn 10/30/2019 12:39 AM  
  
 
Opportunity for questions and clarification was provided. Patient transported with: 
Transport

## 2019-10-30 NOTE — CONSULTS
[facility]  Inpatient Consultation        Admit Date: 10/30/2019  Reason for Consultation: gallbladder sludge/RUQ pain    HPI:  Alan Dumas is a 30 y.o. female w/ no PMHx whom we are asked to see in consultation by Dr. Brian Pyle for the above complaint. Pt presented to the ED last evening w/ c/o n/v, body aches, and fevers over the last 5 days. She has had temp to 102. Flu negative. She states she did not have RUQ pain unless someone pressed on her abdomen. She has a nl WBC; serologies so far are negative; pending is the EB virus and HIV. U/s shows hepatomegaly and hepatic steatosis,  Splenomegaly, and a small amount of gallbladder sludge with an adherent stone or sludge ball measuring 7 mm. No biliary duct dilatation. She [place] been out of the country,   Heme onc also consulted for schistocytosis. Patient Active Problem List    Diagnosis Date Noted    SIRS (systemic inflammatory response syndrome) ([facility] 10/30/2019    Prolonged QT interval 10/30/2019    GISELLA (acute kidney injury) ([facility] 10/30/2019    Thrombocytopenia ([facility] 10/30/2019    Hypokalemia 10/30/2019    Hypophosphatemia 10/30/2019    Hyponatremia 10/30/2019     History reviewed. No pertinent past medical history. History reviewed. No pertinent surgical history. Social History     Tobacco Use    Smoking status: Never Smoker   Substance Use Topics    Alcohol use: Not on file      No family history on file. Prior to Admission medications    Medication Sig Start Date End Date Taking? Authorizing Provider   norethindrone-ethinyl estradiol (JUNEL FE 1/20, 28,) 1 mg-20 mcg (21)/75 mg (7) tab Take 1 Tab by mouth daily. Yes Provider, Historical   methylPREDNISolone (MEDROL, SAHIL,) 4 mg tablet Specific Days and Specific Times. (Started 6 day Medrol Dose Pack ~ 10/27/19?)   Yes Provider, Historical   oseltamivir (TAMIFLU) 75 mg capsule 75 mg. Took 1 dose ~10/25/19?    Yes Provider, Historical   traMADol (ULTRAM) 50 mg tablet Take 50 mg by mouth every six (6) hours as needed for Pain. Yes Provider, Historical   ibuprofen (ADVIL) 200 mg tablet Take 200 mg by mouth every six (6) hours as needed for Pain. Yes Provider, Historical     Current Facility-Administered Medications   Medication Dose Route Frequency    sodium chloride (NS) flush 5-10 mL  5-10 mL IntraVENous PRN    sodium chloride (NS) flush 5-40 mL  5-40 mL IntraVENous Q8H    sodium chloride (NS) flush 5-40 mL  5-40 mL IntraVENous PRN    acetaminophen (TYLENOL) tablet 650 mg  650 mg Oral Q4H PRN    0.9% sodium chloride infusion  150 mL/hr IntraVENous CONTINUOUS    cefepime (MAXIPIME) 2 g in 0.9% sodium chloride (MBP/ADV) 100 mL  2 g IntraVENous Q12H     Allergies   Allergen Reactions    Sulfa (Sulfonamide Antibiotics) Hives          Subjective:     Review of Systems:    A comprehensive review of systems was negative except for that written in the History of Present Illness. Objective:     Blood pressure 99/61, pulse 76, temperature 98 °F (36.7 °C), resp. rate 16, height 5' 2\" (1.575 m), weight 126 lb (57.2 kg), last menstrual period 10/09/2019, SpO2 100 %. Temp (24hrs), Av.5 °F (37.5 °C), Min:98 °F (36.7 °C), Max:102.2 °F (39 °C)      Recent Labs     10/30/19  0046   WBC 10.2   HGB 11.9   HCT 35.7   *     Recent Labs     10/30/19  0046   *   K 3.2*   CL 98   CO2 25   *   BUN 34*   CREA 1.56*   CA 9.7   MG 1.9   PHOS 0.9*   ALB 3.2*   TBILI 0.9   SGOT 22   ALT 20     Recent Labs     10/30/19  0046   LPSE 53*       No intake or output data in the 24 hours ending 10/30/19 1426    _____________________  Physical Exam:     General:  Alert, cooperative, no distress, appears stated age. Eyes:   Sclera clear. Throat: Lips, mucosa, and tongue normal.   Neck: Supple, symmetrical, trachea midline. Lungs:   Clear to auscultation bilaterally. Heart:  Regular rate and rhythm.    Abdomen:   Normal BS, flat, Soft, mild RuQ, RLQ, and mid abd tenderness. No masses,  No organomegaly. Extremities: Extremities normal, atraumatic, no cyanosis or edema. Skin: Skin color, texture, turgor normal. No rashes or lesions. Assessment:   Active Problems:    SIRS (systemic inflammatory response syndrome) (HCC) (10/30/2019)      Prolonged QT interval (10/30/2019)      GISELLA (acute kidney injury) ([facility] (10/30/2019)      Thrombocytopenia ([address] (10/30/2019)      Hypokalemia (10/30/2019)      Hypophosphatemia (10/30/2019)      Hyponatremia (10/30/2019)            Plan:     Await pending studies  Gallbladder likely not cause of sepsis - pain on palpation may be from hepatomegaly and splenomegaly  Dr Will Hamm to see    Thank you for allowing us to participate in the care of this patient. Total time spent with patient: 20 minutes. Signed By: Gabriela Mckeon NP     October 30, 2019        Pt seen and examined  5  Day history of sore throat, headache with fevers. Only has abdominal pain with palpation. The pain is mainly in the epigastric and RUQ. She denies any food intolerances. Gen- Alert in NAd  Lungs- CTA  H-RRR  Abd- S/nd mild discomfort on the RUQ. No coyle sign  U/s- hepato-splenomegaly GB sludge  A/p Doubt gallbladder is the source of her problems- Suspect viral illness  Will get HIDA scan to definitively rule it out. If HIDA is negative there is no indication for GB intervention. Would not even suggest that she have lap troy electively as before this began she has been asymptomatic from this.

## 2019-10-30 NOTE — PROGRESS NOTES
TRANSFER - IN REPORT:    Verbal report received from PABLO Hoffman(name) on [place]ain [place]  being received from ED(unit) for routine progression of care      Report consisted of patients Situation, Background, Assessment and   Recommendations(SBAR). Information from the following report(s) SBAR, Kardex and ED Summary was reviewed with the receiving nurse. Opportunity for questions and clarification was provided. Assessment completed upon patients arrival to unit and care assumed.

## 2019-10-30 NOTE — ED TRIAGE NOTES
Started to get sick Friday. Seen at urgent care Saturday and started on steroid, tamiflu. She has continued with a fever and feeling worse.

## 2019-10-30 NOTE — ED NOTES
Bedside and Verbal shift change report given to [address] [place] [address] [place] (oncoming nurse) by Renetta Fontaine RN (offgoing nurse). Report included the following information SBAR, ED Summary, MAR and Recent Results.

## 2019-10-31 ENCOUNTER — APPOINTMENT (OUTPATIENT)
Dept: NUCLEAR MEDICINE | Age: 24
DRG: 866 | End: 2019-10-31
Attending: SURGERY
Payer: COMMERCIAL

## 2019-10-31 LAB
ANION GAP SERPL CALC-SCNC: 7 MMOL/L (ref 5–15)
ATRIAL RATE: 115 BPM
BUN SERPL-MCNC: 17 MG/DL (ref 6–20)
BUN/CREAT SERPL: 25 (ref 12–20)
CALCIUM SERPL-MCNC: 8.2 MG/DL (ref 8.5–10.1)
CALCULATED R AXIS, ECG10: 29 DEGREES
CALCULATED T AXIS, ECG11: 59 DEGREES
CHLORIDE SERPL-SCNC: 112 MMOL/L (ref 97–108)
CMV IGG SERPL IA-ACNC: <0.6 U/ML (ref 0–0.59)
CMV IGM SERPL IA-ACNC: <30 AU/ML (ref 0–29.9)
CO2 SERPL-SCNC: 21 MMOL/L (ref 21–32)
COMMENT, 096657: ABNORMAL
CREAT SERPL-MCNC: 0.68 MG/DL (ref 0.55–1.02)
DIAGNOSIS, 93000: NORMAL
EBV EA IGG SER-ACNC: <9 U/ML (ref 0–8.9)
EBV NA IGG SER-ACNC: 70 U/ML (ref 0–17.9)
EBV NA IGG SER-ACNC: 73.4 U/ML (ref 0–17.9)
EBV VCA IGG SER-ACNC: 54.2 U/ML (ref 0–17.9)
EBV VCA IGG SER-ACNC: 58.7 U/ML (ref 0–17.9)
EBV VCA IGM SER-ACNC: <36 U/ML (ref 0–35.9)
EBV VCA IGM SER-ACNC: <36 U/ML (ref 0–35.9)
ERYTHROCYTE [DISTWIDTH] IN BLOOD BY AUTOMATED COUNT: 13.1 % (ref 11.5–14.5)
GLUCOSE SERPL-MCNC: 130 MG/DL (ref 65–100)
HCT VFR BLD AUTO: 28.6 % (ref 35–47)
HGB BLD-MCNC: 9.3 G/DL (ref 11.5–16)
HIV1 RNA # SERPL NAA+PROBE: <20 COPIES/ML
HIV1 RNA SERPL NAA+PROBE-LOG#: NORMAL LOG10COPY/ML
INTERPRETATION, 169995: ABNORMAL
LDH SERPL L TO P-CCNC: 162 U/L (ref 81–246)
MAGNESIUM SERPL-MCNC: 1.9 MG/DL (ref 1.6–2.4)
MCH RBC QN AUTO: 28.9 PG (ref 26–34)
MCHC RBC AUTO-ENTMCNC: 32.5 G/DL (ref 30–36.5)
MCV RBC AUTO: 88.8 FL (ref 80–99)
NRBC # BLD: 0 K/UL (ref 0–0.01)
NRBC BLD-RTO: 0 PER 100 WBC
P-R INTERVAL, ECG05: 104 MS
PHOSPHATE SERPL-MCNC: 2.8 MG/DL (ref 2.6–4.7)
PLATELET # BLD AUTO: 96 K/UL (ref 150–400)
PMV BLD AUTO: 11.5 FL (ref 8.9–12.9)
POTASSIUM SERPL-SCNC: 3.6 MMOL/L (ref 3.5–5.1)
Q-T INTERVAL, ECG07: 456 MS
QRS DURATION, ECG06: 72 MS
QTC CALCULATION (BEZET), ECG08: 630 MS
RBC # BLD AUTO: 3.22 M/UL (ref 3.8–5.2)
SODIUM SERPL-SCNC: 140 MMOL/L (ref 136–145)
T GONDII IGG SERPL IA-ACNC: <3 IU/ML (ref 0–7.1)
T GONDII IGM SER IA-ACNC: <3 AU/ML (ref 0–7.9)
VENTRICULAR RATE, ECG03: 115 BPM
WBC # BLD AUTO: 9.6 K/UL (ref 3.6–11)

## 2019-10-31 PROCEDURE — 74011250636 HC RX REV CODE- 250/636: Performed by: INTERNAL MEDICINE

## 2019-10-31 PROCEDURE — 74011250636 HC RX REV CODE- 250/636: Performed by: HOSPITALIST

## 2019-10-31 PROCEDURE — 74011250637 HC RX REV CODE- 250/637: Performed by: NURSE PRACTITIONER

## 2019-10-31 PROCEDURE — 85027 COMPLETE CBC AUTOMATED: CPT

## 2019-10-31 PROCEDURE — A9537 TC99M MEBROFENIN: HCPCS

## 2019-10-31 PROCEDURE — 74011000258 HC RX REV CODE- 258: Performed by: HOSPITALIST

## 2019-10-31 PROCEDURE — 80048 BASIC METABOLIC PNL TOTAL CA: CPT

## 2019-10-31 PROCEDURE — 74011250637 HC RX REV CODE- 250/637: Performed by: INTERNAL MEDICINE

## 2019-10-31 PROCEDURE — 36415 COLL VENOUS BLD VENIPUNCTURE: CPT

## 2019-10-31 PROCEDURE — 65270000029 HC RM PRIVATE

## 2019-10-31 PROCEDURE — 74011000258 HC RX REV CODE- 258: Performed by: INTERNAL MEDICINE

## 2019-10-31 PROCEDURE — 74011250637 HC RX REV CODE- 250/637: Performed by: HOSPITALIST

## 2019-10-31 PROCEDURE — 83735 ASSAY OF MAGNESIUM: CPT

## 2019-10-31 RX ORDER — SODIUM CHLORIDE 0.9 % (FLUSH) 0.9 %
10 SYRINGE (ML) INJECTION
Status: COMPLETED | OUTPATIENT
Start: 2019-10-31 | End: 2019-10-31

## 2019-10-31 RX ORDER — DEXTROSE MONOHYDRATE AND SODIUM CHLORIDE 5; .9 G/100ML; G/100ML
150 INJECTION, SOLUTION INTRAVENOUS CONTINUOUS
Status: DISCONTINUED | OUTPATIENT
Start: 2019-10-31 | End: 2019-11-02 | Stop reason: HOSPADM

## 2019-10-31 RX ORDER — SODIUM CHLORIDE 9 MG/ML
25 INJECTION, SOLUTION INTRAVENOUS
Status: ACTIVE | OUTPATIENT
Start: 2019-10-31 | End: 2019-11-01

## 2019-10-31 RX ORDER — SODIUM CHLORIDE 9 MG/ML
25 INJECTION, SOLUTION INTRAVENOUS
Status: COMPLETED | OUTPATIENT
Start: 2019-10-31 | End: 2019-10-31

## 2019-10-31 RX ADMIN — BUTALBITAL, ACETAMINOPHEN AND CAFFEINE 1 TABLET: 50; 325; 40 TABLET ORAL at 00:14

## 2019-10-31 RX ADMIN — SODIUM CHLORIDE 150 ML/HR: 900 INJECTION, SOLUTION INTRAVENOUS at 00:53

## 2019-10-31 RX ADMIN — SODIUM CHLORIDE 500 ML: 900 INJECTION, SOLUTION INTRAVENOUS at 16:11

## 2019-10-31 RX ADMIN — ACETAMINOPHEN 650 MG: 325 TABLET, FILM COATED ORAL at 17:45

## 2019-10-31 RX ADMIN — SODIUM CHLORIDE 25 ML: 900 INJECTION, SOLUTION INTRAVENOUS at 10:00

## 2019-10-31 RX ADMIN — CEFEPIME HYDROCHLORIDE 2 G: 2 INJECTION, POWDER, FOR SOLUTION INTRAVENOUS at 10:03

## 2019-10-31 RX ADMIN — Medication 10 ML: at 21:35

## 2019-10-31 RX ADMIN — Medication 10 ML: at 14:17

## 2019-10-31 RX ADMIN — OXYCODONE HYDROCHLORIDE 5 MG: 5 TABLET ORAL at 20:34

## 2019-10-31 RX ADMIN — SINCALIDE 1.2 MCG: 5 INJECTION, POWDER, LYOPHILIZED, FOR SOLUTION INTRAVENOUS at 10:00

## 2019-10-31 RX ADMIN — OXYCODONE HYDROCHLORIDE 5 MG: 5 TABLET ORAL at 14:17

## 2019-10-31 RX ADMIN — OXYCODONE HYDROCHLORIDE 5 MG: 5 TABLET ORAL at 10:03

## 2019-10-31 RX ADMIN — DEXTROSE MONOHYDRATE AND SODIUM CHLORIDE 150 ML/HR: 5; .9 INJECTION, SOLUTION INTRAVENOUS at 16:13

## 2019-10-31 RX ADMIN — SODIUM CHLORIDE 150 ML/HR: 900 INJECTION, SOLUTION INTRAVENOUS at 10:11

## 2019-10-31 RX ADMIN — Medication 10 ML: at 07:56

## 2019-10-31 NOTE — PROGRESS NOTES
Hospitalist Progress Note  Julius Coker MD  Answering service: [phone] from in house phone        Date of Service:  10/31/2019  NAME:  Billy Tirado  :  1995  MRN:  [id number]      Admission Summary:   Admitted with 1 week of fever,malaise. Sore throat    Interval history / Subjective:         10/31/2019 :  hida neg  Pt w low grade fever  Confirming EBV titers pending  Pt cont with rt neck > neck pain. Assessment & Plan:     Viral illness, likely EBV pendng studies with sore thoart, hepatosplenomegally, fever,   Gall stone/sludge w/o obstu for hida scan= neg 10/31/2019   Code status: full   DVT prophylaxis: Lomená 1965 discussed with: Patient/Family and Nurse  Disposition: Home w/Family and TBD     Hospital Problems  Never Reviewed          Codes Class Noted POA    SIRS (systemic inflammatory response syndrome) ([facility] ICD-10-CM: R65.10  ICD-9-CM: 995.90  10/30/2019 Yes        Prolonged QT interval ICD-10-CM: R94.31  ICD-9-CM: 794.31  10/30/2019 Yes        GISELLA (acute kidney injury) ([facility] ICD-10-CM: N17.9  ICD-9-CM: 584.9  10/30/2019 Yes        Thrombocytopenia ([facility] ICD-10-CM: D69.6  ICD-9-CM: 287.5  10/30/2019 Yes        Hypokalemia ICD-10-CM: E87.6  ICD-9-CM: 276.8  10/30/2019 Yes        Hypophosphatemia ICD-10-CM: E83.39  ICD-9-CM: 275.3  10/30/2019 Yes        Hyponatremia ICD-10-CM: E87.1  ICD-9-CM: 276.1  10/30/2019 Yes                Review of Systems:   Pertinent items are noted in HPI. Vital Signs:    Last 24hrs VS reviewed since prior progress note.  Most recent are:  Visit Vitals  /75 (BP 1 Location: Right arm, BP Patient Position: Sitting)   Pulse 90   Temp 99.2 °F (37.3 °C)   Resp 18   Ht 5' 2\" (1.575 m)   Wt 59.8 kg (131 lb 12.8 oz)   SpO2 92%   BMI 24.11 kg/m²       No intake or output data in the 24 hours ending 10/31/19 1630     Physical Examination:             Constitutional:  No acute distress, cooperative, pleasant    ENT:  tender submandibulare area rhonda rt  ;else Oral mucous moist, oropharynx benign. Resp:  CTA bilaterally. No wheezing/rhonchi/rales. No accessory muscle use   CV:  Regular rhythm, normal rate, no murmurs, gallops, rubs    GI:  Soft, non distended, upper rt /left  Mild tender  and c/c hepatosplenomegally . normoactive bowel sounds,      Musculoskeletal:  No edema, warm, 2+ pulses throughout    Neurologic:  Moves all extremities. AAOx3, CN II-XII reviewed     Psych:  Good insight, Not anxious nor agitated. Skin:  Good turgor, no rashes or ulcers       Data Review:    Review and/or order of clinical lab test      Labs:     Recent Labs     10/31/19  0305 10/30/19  0046   WBC 9.6 10.2   HGB 9.3* 11.9   HCT 28.6* 35.7   PLT 96* 102*     Recent Labs     10/31/19  0305 10/30/19  1341 10/30/19  0046    139 130*   K 3.6 4.5 3.2*   * 110* 98   CO2 21 26 25   BUN 17 26* 34*   CREA 0.68 0.97 1.56*   * 171* 132*   CA 8.2* 8.6 9.7   MG 1.9  --  1.9   PHOS  --  2.8  2.7 0.9*     Recent Labs     10/30/19  0046   SGOT 22   ALT 20   *   TBILI 0.9   TP 7.3   ALB 3.2*   GLOB 4.1*   LPSE 53*     No results for input(s): INR, PTP, APTT, INREXT, INREXT in the last 72 hours. No results for input(s): FE, TIBC, PSAT, FERR in the last 72 hours. No results found for: FOL, RBCF   No results for input(s): PH, PCO2, PO2 in the last 72 hours. No results for input(s): CPK, CKNDX, TROIQ in the last 72 hours.     No lab exists for component: CPKMB  No results found for: CHOL, CHOLX, CHLST, CHOLV, HDL, HDLP, LDL, LDLC, DLDLP, TGLX, TRIGL, TRIGP, CHHD, CHHDX  No results found for: GLUCPOC  Lab Results   Component Value Date/Time    Color DARK YELLOW 10/30/2019 06:37 AM    Appearance TURBID (A) 10/30/2019 06:37 AM    Specific gravity 1.010 10/30/2019 06:37 AM    pH (UA) 5.0 10/30/2019 06:37 AM    Protein 30 (A) 10/30/2019 06:37 AM    Glucose NEGATIVE  10/30/2019 06:37 AM    Ketone NEGATIVE  10/30/2019 06:37 AM    Bilirubin NEGATIVE  10/30/2019 06:37 AM    Urobilinogen 2.0 (H) 10/30/2019 06:37 AM    Nitrites NEGATIVE  10/30/2019 06:37 AM    Leukocyte Esterase TRACE (A) 10/30/2019 06:37 AM    Epithelial cells MODERATE (A) 10/30/2019 06:37 AM    Bacteria NEGATIVE  10/30/2019 06:37 AM    WBC 5-10 10/30/2019 06:37 AM    RBC 0-5 10/30/2019 06:37 AM         Medications Reviewed:     Current Facility-Administered Medications   Medication Dose Route Frequency    0.9% sodium chloride infusion 25 mL  25 mL IntraVENous RAD ONCE    dextrose 5% and 0.9% NaCl infusion  150 mL/hr IntraVENous CONTINUOUS    sodium chloride 0.9 % bolus infusion 500 mL  500 mL IntraVENous ONCE    sodium chloride (NS) flush 5-10 mL  5-10 mL IntraVENous PRN    sodium chloride (NS) flush 5-40 mL  5-40 mL IntraVENous Q8H    sodium chloride (NS) flush 5-40 mL  5-40 mL IntraVENous PRN    acetaminophen (TYLENOL) tablet 650 mg  650 mg Oral Q4H PRN    0.9% sodium chloride infusion  150 mL/hr IntraVENous CONTINUOUS    oxyCODONE IR (ROXICODONE) tablet 5 mg  5 mg Oral Q4H PRN     ______________________________________________________________________  EXPECTED LENGTH OF STAY: - - -  ACTUAL LENGTH OF STAY:          1                 Karen Gamez MD

## 2019-10-31 NOTE — PROGRESS NOTES
Patient c/o pain in right side of neck, jaw, ear, and throat. I have administered pain medication and she when from a 9 to a 7. I also gave her an ice pack.

## 2019-11-01 LAB
ADAMTS13 COMMENT, ADAM2T: NORMAL
ALBUMIN SERPL-MCNC: 1.9 G/DL (ref 3.5–5)
ALBUMIN/GLOB SERPL: 0.6 {RATIO} (ref 1.1–2.2)
ALP SERPL-CCNC: 87 U/L (ref 45–117)
ALT SERPL-CCNC: 18 U/L (ref 12–78)
ANION GAP SERPL CALC-SCNC: 4 MMOL/L (ref 5–15)
AST SERPL-CCNC: 14 U/L (ref 15–37)
BACTERIA SPEC CULT: NORMAL
BACTERIA SPEC CULT: NORMAL
BASOPHILS # BLD: 0 K/UL (ref 0–0.1)
BASOPHILS NFR BLD: 0 % (ref 0–1)
BILIRUB SERPL-MCNC: 0.4 MG/DL (ref 0.2–1)
BUN SERPL-MCNC: 8 MG/DL (ref 6–20)
BUN/CREAT SERPL: 14 (ref 12–20)
CALCIUM SERPL-MCNC: 7.9 MG/DL (ref 8.5–10.1)
CC UR VC: NORMAL
CHLORIDE SERPL-SCNC: 107 MMOL/L (ref 97–108)
CO2 SERPL-SCNC: 26 MMOL/L (ref 21–32)
CREAT SERPL-MCNC: 0.59 MG/DL (ref 0.55–1.02)
DIFFERENTIAL METHOD BLD: ABNORMAL
EOSINOPHIL # BLD: 0.1 K/UL (ref 0–0.4)
EOSINOPHIL NFR BLD: 1 % (ref 0–7)
ERYTHROCYTE [DISTWIDTH] IN BLOOD BY AUTOMATED COUNT: 13.2 % (ref 11.5–14.5)
GLOBULIN SER CALC-MCNC: 3.1 G/DL (ref 2–4)
GLUCOSE SERPL-MCNC: 152 MG/DL (ref 65–100)
HCT VFR BLD AUTO: 28.6 % (ref 35–47)
HGB BLD-MCNC: 9.4 G/DL (ref 11.5–16)
IMM GRANULOCYTES # BLD AUTO: 0 K/UL
IMM GRANULOCYTES NFR BLD AUTO: 0 %
LYMPHOCYTES # BLD: 2.2 K/UL (ref 0.8–3.5)
LYMPHOCYTES NFR BLD: 22 % (ref 12–49)
MCH RBC QN AUTO: 28.7 PG (ref 26–34)
MCHC RBC AUTO-ENTMCNC: 32.9 G/DL (ref 30–36.5)
MCV RBC AUTO: 87.5 FL (ref 80–99)
METAMYELOCYTES NFR BLD MANUAL: 1 %
MONOCYTES # BLD: 0.7 K/UL (ref 0–1)
MONOCYTES NFR BLD: 7 % (ref 5–13)
MYELOCYTES NFR BLD MANUAL: 1 %
NEUTS BAND NFR BLD MANUAL: 6 % (ref 0–6)
NEUTS SEG # BLD: 6.8 K/UL (ref 1.8–8)
NEUTS SEG NFR BLD: 62 % (ref 32–75)
NRBC # BLD: 0 K/UL (ref 0–0.01)
NRBC BLD-RTO: 0 PER 100 WBC
PLATELET # BLD AUTO: 121 K/UL (ref 150–400)
PLATELET COMMENTS,PCOM: ABNORMAL
PMV BLD AUTO: 11.2 FL (ref 8.9–12.9)
POTASSIUM SERPL-SCNC: 3 MMOL/L (ref 3.5–5.1)
PROT SERPL-MCNC: 5 G/DL (ref 6.4–8.2)
RBC # BLD AUTO: 3.27 M/UL (ref 3.8–5.2)
RBC MORPH BLD: ABNORMAL
SERVICE CMNT-IMP: NORMAL
SERVICE CMNT-IMP: NORMAL
SODIUM SERPL-SCNC: 137 MMOL/L (ref 136–145)
VWF CP ACT/NOR PPP CHRO: 45.7 %
WBC # BLD AUTO: 10 K/UL (ref 3.6–11)
WBC MORPH BLD: ABNORMAL

## 2019-11-01 PROCEDURE — 74011000258 HC RX REV CODE- 258: Performed by: INTERNAL MEDICINE

## 2019-11-01 PROCEDURE — 36415 COLL VENOUS BLD VENIPUNCTURE: CPT

## 2019-11-01 PROCEDURE — 74011250636 HC RX REV CODE- 250/636: Performed by: INTERNAL MEDICINE

## 2019-11-01 PROCEDURE — 74011250637 HC RX REV CODE- 250/637: Performed by: INTERNAL MEDICINE

## 2019-11-01 PROCEDURE — 65270000029 HC RM PRIVATE

## 2019-11-01 PROCEDURE — 85025 COMPLETE CBC W/AUTO DIFF WBC: CPT

## 2019-11-01 PROCEDURE — 80053 COMPREHEN METABOLIC PANEL: CPT

## 2019-11-01 PROCEDURE — 74011250637 HC RX REV CODE- 250/637: Performed by: HOSPITALIST

## 2019-11-01 RX ORDER — POTASSIUM CHLORIDE 750 MG/1
40 TABLET, FILM COATED, EXTENDED RELEASE ORAL
Status: ACTIVE | OUTPATIENT
Start: 2019-11-01 | End: 2019-11-01

## 2019-11-01 RX ORDER — ONDANSETRON 2 MG/ML
4 INJECTION INTRAMUSCULAR; INTRAVENOUS
Status: DISCONTINUED | OUTPATIENT
Start: 2019-11-01 | End: 2019-11-02 | Stop reason: HOSPADM

## 2019-11-01 RX ORDER — OXYCODONE HYDROCHLORIDE 5 MG/1
2.5 TABLET ORAL
Status: DISCONTINUED | OUTPATIENT
Start: 2019-11-01 | End: 2019-11-02 | Stop reason: HOSPADM

## 2019-11-01 RX ADMIN — DEXTROSE MONOHYDRATE AND SODIUM CHLORIDE 150 ML/HR: 5; .9 INJECTION, SOLUTION INTRAVENOUS at 00:21

## 2019-11-01 RX ADMIN — ACETAMINOPHEN 650 MG: 325 TABLET, FILM COATED ORAL at 23:31

## 2019-11-01 RX ADMIN — DEXTROSE MONOHYDRATE AND SODIUM CHLORIDE 150 ML/HR: 5; .9 INJECTION, SOLUTION INTRAVENOUS at 19:21

## 2019-11-01 RX ADMIN — ACETAMINOPHEN 650 MG: 325 TABLET, FILM COATED ORAL at 19:22

## 2019-11-01 RX ADMIN — OXYCODONE HYDROCHLORIDE 5 MG: 5 TABLET ORAL at 07:12

## 2019-11-01 RX ADMIN — OXYCODONE HYDROCHLORIDE 5 MG: 5 TABLET ORAL at 02:20

## 2019-11-01 RX ADMIN — Medication 10 ML: at 13:02

## 2019-11-01 RX ADMIN — DEXTROSE MONOHYDRATE AND SODIUM CHLORIDE 150 ML/HR: 5; .9 INJECTION, SOLUTION INTRAVENOUS at 07:04

## 2019-11-01 RX ADMIN — DEXTROSE MONOHYDRATE AND SODIUM CHLORIDE 150 ML/HR: 5; .9 INJECTION, SOLUTION INTRAVENOUS at 13:16

## 2019-11-01 RX ADMIN — ONDANSETRON 4 MG: 2 INJECTION INTRAMUSCULAR; INTRAVENOUS at 10:36

## 2019-11-01 NOTE — PROGRESS NOTES
[address] November 1, 2019       RE: Dayana Colon      To Whom It May Concern,    This is to certify that Dayana Colon has been under my care since 10/30/2109 and is too ill to return to work 10/29 - 11/9;  Pt may retrun to work on 11/10/2019     Please feel free to contact my office if you have any questions or concerns. Thank you for your assistance in this matter.       Sincerely,  Sven Whiteside MD

## 2019-11-01 NOTE — PROGRESS NOTES
Hospitalist Progress Note  Radha Burk MD  Answering service: [phone] from in house phone        Date of Service:  2019  NAME:  Emmy Moreland  :  1995  MRN:  [id number]      Admission Summary:   Admitted with 1 week of fever,malaise. Sore throat    Interval history / Subjective:       2019   EBV studies suggest recent infection now in convalescents; But too ill to return home not eating/drinking well, discussed with family ( mother) and pt and rn         Assessment & Plan:     Viral illness, likely EBV pendng studies with sore thoart, hepatosplenomegally, fever, = EVB neuclear ab + 70 + range. Pt not eating well cont on ivf 2019   Gall stone/sludge w/o obstu for hida scan = neg Hida  Code status: full   DVT prophylaxis: Huma  discussed with: Patient/Family and Nurse  Disposition: Home w/Family and TBD     Hospital Problems  Never Reviewed          Codes Class Noted POA    SIRS (systemic inflammatory response syndrome) ([facility] ICD-10-CM: R65.10  ICD-9-CM: 995.90  10/30/2019 Yes        Prolonged QT interval ICD-10-CM: R94.31  ICD-9-CM: 794.31  10/30/2019 Yes        GISELLA (acute kidney injury) ([facility].) ICD-10-CM: N17.9  ICD-9-CM: 584.9  10/30/2019 Yes        Thrombocytopenia ([facility].) ICD-10-CM: D69.6  ICD-9-CM: 287.5  10/30/2019 Yes        Hypokalemia ICD-10-CM: E87.6  ICD-9-CM: 276.8  10/30/2019 Yes        Hypophosphatemia ICD-10-CM: E83.39  ICD-9-CM: 275.3  10/30/2019 Yes        Hyponatremia ICD-10-CM: E87.1  ICD-9-CM: 276.1  10/30/2019 Yes                Review of Systems:   Pertinent items are noted in HPI. Vital Signs:    Last 24hrs VS reviewed since prior progress note.  Most recent are:  Visit Vitals  /72 (BP 1 Location: Right arm, BP Patient Position: At rest)   Pulse 86   Temp 98.4 °F (36.9 °C)   Resp 17   Ht 5' 2\" (1.575 m)   Wt 65 kg (143 lb 4.8 oz)   SpO2 95%   BMI 26.21 kg/m²       No intake or output data in the 24 hours ending 11/01/19 0950     Physical Examination:             Constitutional:  No acute distress, cooperative, pleasant    ENT:  tender submandibulare area ;else Oral mucous moist, oropharynx benign. And TM's clear    Resp:  CTA bilaterally. No wheezing/rhonchi/rales. No accessory muscle use   CV:  Regular rhythm, normal rate, no murmurs, gallops, rubs    GI:  Soft, non distended, upper rt /left  Tender and c/c hepatosplenomegally . normoactive bowel sounds,      Musculoskeletal:  No edema, warm, 2+ pulses throughout    Neurologic:  Moves all extremities. AAOx3, CN II-XII reviewed     Psych:  Good insight, Not anxious nor agitated. Skin:  Good turgor, no rashes or ulcers       Data Review:    Review and/or order of clinical lab test      Labs:     Recent Labs     11/01/19  0231 10/31/19  0305   WBC 10.0 9.6   HGB 9.4* 9.3*   HCT 28.6* 28.6*   * 96*     Recent Labs     11/01/19  0231 10/31/19  0305 10/30/19  1341 10/30/19  0046    140 139 130*   K 3.0* 3.6 4.5 3.2*    112* 110* 98   CO2 26 21 26 25   BUN 8 17 26* 34*   CREA 0.59 0.68 0.97 1.56*   * 130* 171* 132*   CA 7.9* 8.2* 8.6 9.7   MG  --  1.9  --  1.9   PHOS  --   --  2.8  2.7 0.9*     Recent Labs     11/01/19  0231 10/30/19  0046   SGOT 14* 22   ALT 18 20   AP 87 173*   TBILI 0.4 0.9   TP 5.0* 7.3   ALB 1.9* 3.2*   GLOB 3.1 4.1*   LPSE  --  53*     No results for input(s): INR, PTP, APTT, INREXT, INREXT in the last 72 hours. No results for input(s): FE, TIBC, PSAT, FERR in the last 72 hours. No results found for: FOL, RBCF   No results for input(s): PH, PCO2, PO2 in the last 72 hours. No results for input(s): CPK, CKNDX, TROIQ in the last 72 hours.     No lab exists for component: CPKMB  No results found for: CHOL, CHOLX, CHLST, CHOLV, HDL, HDLP, LDL, LDLC, DLDLP, TGLX, TRIGL, TRIGP, CHHD, CHHDX  No results found for: GLUCPOC  Lab Results   Component Value Date/Time    Color DARK YELLOW 10/30/2019 06:37 AM    Appearance TURBID (A) 10/30/2019 06:37 AM    Specific gravity 1.010 10/30/2019 06:37 AM    pH (UA) 5.0 10/30/2019 06:37 AM    Protein 30 (A) 10/30/2019 06:37 AM    Glucose NEGATIVE  10/30/2019 06:37 AM    Ketone NEGATIVE  10/30/2019 06:37 AM    Bilirubin NEGATIVE  10/30/2019 06:37 AM    Urobilinogen 2.0 (H) 10/30/2019 06:37 AM    Nitrites NEGATIVE  10/30/2019 06:37 AM    Leukocyte Esterase TRACE (A) 10/30/2019 06:37 AM    Epithelial cells MODERATE (A) 10/30/2019 06:37 AM    Bacteria NEGATIVE  10/30/2019 06:37 AM    WBC 5-10 10/30/2019 06:37 AM    RBC 0-5 10/30/2019 06:37 AM         Medications Reviewed:     Current Facility-Administered Medications   Medication Dose Route Frequency    benzocaine-menthol (CEPACOL) lozenge 1 Lozenge  1 Lozenge Mucous Membrane PRN    dextrose 5% and 0.9% NaCl infusion  150 mL/hr IntraVENous CONTINUOUS    sodium chloride (NS) flush 5-10 mL  5-10 mL IntraVENous PRN    sodium chloride (NS) flush 5-40 mL  5-40 mL IntraVENous Q8H    sodium chloride (NS) flush 5-40 mL  5-40 mL IntraVENous PRN    acetaminophen (TYLENOL) tablet 650 mg  650 mg Oral Q4H PRN    oxyCODONE IR (ROXICODONE) tablet 5 mg  5 mg Oral Q4H PRN     ______________________________________________________________________  EXPECTED LENGTH OF STAY: - - -  ACTUAL LENGTH OF STAY:          2                 Matthew Hogue MD

## 2019-11-01 NOTE — PROGRESS NOTES
[facility] at [address], [address] [place]: [phone]  F: [phone]    Reason for Visit:   Casie Varela is a 30 y.o. female who is seen in consultation at the request of Dr. Catalina Salinas for evaluation of Schistzocyte. Treatment History:   · None    History of Present Illness: The pt is a very pleasant 31 yo who presented to the ER with a 5 day Hx of fever, chills, feeling bad, and not eating. She is complaining of some right upper quadrant pain as well. Pt has not been out of the country and no tick exposure, she did go camping 2 months ago. No one else is sick. The smear was read as having large platelet and schistocytes. I reviewed the side from the lab and saw one schistocyte in 40 HPF. She has normal RBC morphology with some polikocytosis. The Platelets are decreased in number but normal.  The WBC's have prominent granules with vacuoles and occasional Dohle bodies. 11/1/2019  Patient is doing better schedule sore throat and ice pack on her neck currently has some lymphadenopathy her blood work suggest reactivation of EBV infection. She does have a splenomegaly and hepatomegaly which would also go along with EBV. Her platelet count is recovering very nicely her hemoglobin is still down slightly. It appears that everything will settle down and we will plan on seeing her back if needed if she has any further problems feel free to let us know. History reviewed. No pertinent past medical history. History reviewed. No pertinent surgical history. Social History     Tobacco Use    Smoking status: Never Smoker   Substance Use Topics    Alcohol use: Not on file      No family history on file.   Current Facility-Administered Medications   Medication Dose Route Frequency    benzocaine-menthol (CEPACOL) lozenge 1 Lozenge  1 Lozenge Mucous Membrane PRN    ondansetron (ZOFRAN) injection 4 mg  4 mg IntraVENous Q4H PRN    oxyCODONE IR (ROXICODONE) tablet 2.5 mg  2.5 mg Oral Q4H PRN    dextrose 5% and 0.9% NaCl infusion  150 mL/hr IntraVENous CONTINUOUS    sodium chloride (NS) flush 5-10 mL  5-10 mL IntraVENous PRN    sodium chloride (NS) flush 5-40 mL  5-40 mL IntraVENous Q8H    sodium chloride (NS) flush 5-40 mL  5-40 mL IntraVENous PRN    acetaminophen (TYLENOL) tablet 650 mg  650 mg Oral Q4H PRN      Allergies   Allergen Reactions    Sulfa (Sulfonamide Antibiotics) Hives        Review of Systems: A complete review of systems was obtained, negative except as described above. Physical Exam:     Visit Vitals  /72 (BP 1 Location: Right arm, BP Patient Position: At rest)   Pulse 86   Temp 98.4 °F (36.9 °C)   Resp 17   Ht 5' 2\" (1.575 m)   Wt 143 lb 4.8 oz (65 kg)   LMP 10/09/2019   SpO2 95%   BMI 26.21 kg/m²     ECOG PS: 2  General: she feels miserable  Eyes: PERRLA, anicteric sclerae  Respiratory: CTAB, normal respiratory effort  CV: Normal rate, regular rhythm, no murmurs, no peripheral edema  GI: Soft, tender RUQ , nondistended, no masses, no hepatomegaly, no splenomegaly  MS: Digits without clubbing or cyanosis. Skin: No rashes, ecchymoses, or petechiae. Normal temperature, turgor, and texture. Psych: Alert, oriented, appropriate affect, normal judgment/insight    Results:     Lab Results   Component Value Date/Time    WBC 10.0 11/01/2019 02:31 AM    HGB 9.4 (L) 11/01/2019 02:31 AM    HCT 28.6 (L) 11/01/2019 02:31 AM    PLATELET 373 (L) 65/83/2920 02:31 AM    MCV 87.5 11/01/2019 02:31 AM    ABS.  NEUTROPHILS 6.8 11/01/2019 02:31 AM     Lab Results   Component Value Date/Time    Sodium 137 11/01/2019 02:31 AM    Potassium 3.0 (L) 11/01/2019 02:31 AM    Chloride 107 11/01/2019 02:31 AM    CO2 26 11/01/2019 02:31 AM    Glucose 152 (H) 11/01/2019 02:31 AM    BUN 8 11/01/2019 02:31 AM    Creatinine 0.59 11/01/2019 02:31 AM    GFR est AA >60 11/01/2019 02:31 AM    GFR est non-AA >60 11/01/2019 02:31 AM    Calcium 7.9 (L) 11/01/2019 02:31 AM     Lab Results   Component Value Date/Time    Bilirubin, total 0.4 11/01/2019 02:31 AM    ALT (SGPT) 18 11/01/2019 02:31 AM    AST (SGOT) 14 (L) 11/01/2019 02:31 AM    Alk. phosphatase 87 11/01/2019 02:31 AM    Protein, total 5.0 (L) 11/01/2019 02:31 AM    Albumin 1.9 (L) 11/01/2019 02:31 AM    Globulin 3.1 11/01/2019 02:31 AM         Records reviewed and summarized above. CXR normal 10/30/19     Radiology report(s) reviewed above. Assessment:   1) reactivation of EBV    2) thrombocytopenia resolving      3) hepatosplenomegaly, probably secondary to EBV    Plan: We will be glad to [place] back and see her if there are any other problems. I appreciate the opportunity to participate in Ms. Isaak Kenney's care.     Signed By: Radha Mcfadden MD

## 2019-11-02 VITALS
RESPIRATION RATE: 19 BRPM | OXYGEN SATURATION: 96 % | BODY MASS INDEX: 25.01 KG/M2 | WEIGHT: 135.9 LBS | HEART RATE: 76 BPM | HEIGHT: 62 IN | DIASTOLIC BLOOD PRESSURE: 82 MMHG | TEMPERATURE: 98.1 F | SYSTOLIC BLOOD PRESSURE: 129 MMHG

## 2019-11-02 LAB
ALBUMIN SERPL-MCNC: 1.8 G/DL (ref 3.5–5)
ALBUMIN/GLOB SERPL: 0.6 {RATIO} (ref 1.1–2.2)
ALP SERPL-CCNC: 80 U/L (ref 45–117)
ALT SERPL-CCNC: 20 U/L (ref 12–78)
ANION GAP SERPL CALC-SCNC: 4 MMOL/L (ref 5–15)
AST SERPL-CCNC: 18 U/L (ref 15–37)
BASOPHILS # BLD: 0.1 K/UL (ref 0–0.1)
BASOPHILS NFR BLD: 1 % (ref 0–1)
BILIRUB SERPL-MCNC: 0.3 MG/DL (ref 0.2–1)
BUN SERPL-MCNC: 3 MG/DL (ref 6–20)
BUN/CREAT SERPL: 6 (ref 12–20)
CALCIUM SERPL-MCNC: 8.2 MG/DL (ref 8.5–10.1)
CHLORIDE SERPL-SCNC: 110 MMOL/L (ref 97–108)
CO2 SERPL-SCNC: 26 MMOL/L (ref 21–32)
CREAT SERPL-MCNC: 0.47 MG/DL (ref 0.55–1.02)
DIFFERENTIAL METHOD BLD: ABNORMAL
EOSINOPHIL # BLD: 0.4 K/UL (ref 0–0.4)
EOSINOPHIL NFR BLD: 4 % (ref 0–7)
ERYTHROCYTE [DISTWIDTH] IN BLOOD BY AUTOMATED COUNT: 13 % (ref 11.5–14.5)
GLOBULIN SER CALC-MCNC: 3.2 G/DL (ref 2–4)
GLUCOSE SERPL-MCNC: 121 MG/DL (ref 65–100)
HCT VFR BLD AUTO: 28.9 % (ref 35–47)
HGB BLD-MCNC: 9.8 G/DL (ref 11.5–16)
IMM GRANULOCYTES # BLD AUTO: 0 K/UL
IMM GRANULOCYTES NFR BLD AUTO: 0 %
LYMPHOCYTES # BLD: 2.8 K/UL (ref 0.8–3.5)
LYMPHOCYTES NFR BLD: 26 % (ref 12–49)
MCH RBC QN AUTO: 28.9 PG (ref 26–34)
MCHC RBC AUTO-ENTMCNC: 33.9 G/DL (ref 30–36.5)
MCV RBC AUTO: 85.3 FL (ref 80–99)
MONOCYTES # BLD: 1.2 K/UL (ref 0–1)
MONOCYTES NFR BLD: 11 % (ref 5–13)
MYELOCYTES NFR BLD MANUAL: 1 %
NEUTS BAND NFR BLD MANUAL: 3 % (ref 0–6)
NEUTS SEG # BLD: 6.1 K/UL (ref 1.8–8)
NEUTS SEG NFR BLD: 54 % (ref 32–75)
NRBC # BLD: 0 K/UL (ref 0–0.01)
NRBC BLD-RTO: 0 PER 100 WBC
PLATELET # BLD AUTO: 142 K/UL (ref 150–400)
PMV BLD AUTO: 10.5 FL (ref 8.9–12.9)
POTASSIUM SERPL-SCNC: 3.2 MMOL/L (ref 3.5–5.1)
PROT SERPL-MCNC: 5 G/DL (ref 6.4–8.2)
RBC # BLD AUTO: 3.39 M/UL (ref 3.8–5.2)
RBC MORPH BLD: ABNORMAL
SODIUM SERPL-SCNC: 140 MMOL/L (ref 136–145)
WBC # BLD AUTO: 10.7 K/UL (ref 3.6–11)

## 2019-11-02 PROCEDURE — 80053 COMPREHEN METABOLIC PANEL: CPT

## 2019-11-02 PROCEDURE — 36415 COLL VENOUS BLD VENIPUNCTURE: CPT

## 2019-11-02 PROCEDURE — 74011000258 HC RX REV CODE- 258: Performed by: INTERNAL MEDICINE

## 2019-11-02 PROCEDURE — 85025 COMPLETE CBC W/AUTO DIFF WBC: CPT

## 2019-11-02 PROCEDURE — 74011250637 HC RX REV CODE- 250/637: Performed by: HOSPITALIST

## 2019-11-02 RX ADMIN — DEXTROSE MONOHYDRATE AND SODIUM CHLORIDE 150 ML/HR: 5; .9 INJECTION, SOLUTION INTRAVENOUS at 01:33

## 2019-11-02 RX ADMIN — ACETAMINOPHEN 650 MG: 325 TABLET, FILM COATED ORAL at 10:23

## 2019-11-02 RX ADMIN — ACETAMINOPHEN 650 MG: 325 TABLET, FILM COATED ORAL at 06:29

## 2019-11-02 RX ADMIN — DEXTROSE MONOHYDRATE AND SODIUM CHLORIDE 150 ML/HR: 5; .9 INJECTION, SOLUTION INTRAVENOUS at 08:27

## 2019-11-02 NOTE — PROGRESS NOTES
Upon assessment, 4 potassium pills were found on patient's bedside table. She stated she was not able to take pills due to sore throat. According to [facility], pills were given at 0936. Physician was paged and liquid form was ordered. Upon entering room to give her the liquid form of the medication, no pills were on the bedside table. Patient states she took them. New order for liquid potassium was canceled. We will check Potassium levels this morning during morning lab draw.

## 2019-11-02 NOTE — DISCHARGE INSTRUCTIONS
Discharge Instructions       PATIENT ID: Craole Garcia  MRN: [id number]   YOB: 1995    DATE OF ADMISSION: 10/30/2019 12:07 AM    DATE OF DISCHARGE: 11/2/2019    PRIMARY CARE PROVIDER: None     ATTENDING PHYSICIAN: Yaya Champagne MD  DISCHARGING PROVIDER: Matthew Hogue MD    To contact this individual call [phone] and ask the  to page. If unavailable ask to be transferred the Adult Hospitalist Department. DISCHARGE DIAGNOSES EBV infection     CONSULTATIONS: IP CONSULT TO HEMATOLOGY  IP CONSULT TO GENERAL SURGERY    PROCEDURES/SURGERIES: * No surgery found *    PENDING TEST RESULTS:   At the time of discharge the following test results are still pending: na    FOLLOW UP APPOINTMENTS:   Follow-up Information     Follow up With Specialties Details Why Contact Info    None    None (395) Patient stated that they have no PCP         pcp of choice            ADDITIONAL CARE RECOMMENDATIONS:   See a PCP ( annia/MD./NP w/in one week     DIET: Resume previous diet     ACTIVITY: Activity as tolerated    WOUND CARE: na    EQUIPMENT needed: na      DISCHARGE MEDICATIONS:   See Medication Reconciliation Form    · It is important that you take the medication exactly as they are prescribed. · Keep your medication in the bottles provided by the pharmacist and keep a list of the medication names, dosages, and times to be taken in your wallet. · Do not take other medications without consulting your doctor. NOTIFY YOUR PHYSICIAN FOR ANY OF THE FOLLOWING:   Fever over 101 degrees for 24 hours. Chest pain, shortness of breath, fever, chills, nausea, vomiting, diarrhea, change in mentation, falling, weakness, bleeding. Severe pain or pain not relieved by medications. Or, any other signs or symptoms that you may have questions about.       DISPOSITION:    Home With:   OT  PT  [facility]  RN       SNF/Inpatient Rehab/LTAC   x Independent/assisted living    Hospice    Other:        Signed:   Liyah Mcneil Maria A Andrew MD  11/2/2019  10:57 AM

## 2019-11-02 NOTE — DISCHARGE SUMMARY
Discharge Summary       PATIENT ID: Jerry Brizuela  MRN: [id number]   YOB: 1995    DATE OF ADMISSION: 10/30/2019 12:07 AM    DATE OF DISCHARGE: 11/2/2019    PRIMARY CARE PROVIDER: None     ATTENDING PHYSICIAN: Lashawn Johnson MD   DISCHARGING PROVIDER: Lashawn Johnson MD    To contact this individual call [phone] and ask the  to page. If unavailable ask to be transferred the Adult Hospitalist Department. CONSULTATIONS: IP CONSULT TO HEMATOLOGY  IP CONSULT TO GENERAL SURGERY    PROCEDURES/SURGERIES: * No surgery found *    ADMITTING DIAGNOSES & HOSPITAL COURSE:     Acute viral like illness with rt cervicl/submad adenopahty, sore throat,  hepatosplenomegaly progress over 1 week  --by time seen EBV IGG 70 + and hep/hiv/all neg and  Mono neg    Slowly progressed to improvement in oral intake and is discharged only after n/v abated and able to consistently mohini a diet. DISCHARGE DIAGNOSES / PLAN:      1. EBV infeciton  2. hepatosplenomegaly 2n2 # 1  3. N/V resolved  4. ADDITIONAL CARE RECOMMENDATIONS:   Pt advised not to trust her BCP's until on a new pack      PENDING TEST RESULTS:   At the time of discharge the following test results are still pending: na    FOLLOW UP APPOINTMENTS:    Follow-up Information     Follow up With Specialties Details Why Contact Info    None    None (395) Patient stated that they have no PCP         pcp of choice              DIET: Resume previous diet     ACTIVITY: Activity as tolerated    WOUND CARE: na    EQUIPMENT needed: na      DISCHARGE MEDICATIONS:  Current Discharge Medication List      CONTINUE these medications which have NOT CHANGED    Details   norethindrone-ethinyl estradiol (JUNEL FE 1/20, 28,) 1 mg-20 mcg (21)/75 mg (7) tab Take 1 Tab by mouth daily. ibuprofen (ADVIL) 200 mg tablet Take 200 mg by mouth every six (6) hours as needed for Pain.          STOP taking these medications       methylPREDNISolone (MEDROL, SAHIL,) 4 mg tablet Comments:   Reason for Stopping:         oseltamivir (TAMIFLU) 75 mg capsule Comments:   Reason for Stopping:         traMADol (ULTRAM) 50 mg tablet Comments:   Reason for Stopping:                 NOTIFY YOUR PHYSICIAN FOR ANY OF THE FOLLOWING:   Fever over 101 degrees for 24 hours. Chest pain, shortness of breath, fever, chills, nausea, vomiting, diarrhea, change in mentation, falling, weakness, bleeding. Severe pain or pain not relieved by medications. Or, any other signs or symptoms that you may have questions about. DISPOSITION:    Home With:   OT  PT  HH  RN       Long term SNF/Inpatient Rehab   x Independent/assisted living    Hospice    Other:       PATIENT CONDITION AT DISCHARGE:     Functional status    Poor     Deconditioned    x Independent      Cognition    x Lucid     Forgetful     Dementia      Catheters/lines (plus indication)    Strong     PICC     PEG    x None      Code status    x Full code     DNR      PHYSICAL EXAMINATION AT DISCHARGE:  General:          Alert, cooperative, no distress, appears stated age. HEENT:           Atraumatic, anicteric sclerae, pink conjunctivae                          No oral ulcers, mucosa moist, throat clear, dentition fair  Neck:               rt neck adenopathy, tenderness else  Neg   Lungs:             Clear to auscultation bilaterally. No Wheezing or Rhonchi. No rales. Chest wall:      No tenderness  No Accessory muscle use. Heart:              Regular  rhythm,  No  murmur   No edema  Abdomen:        Soft, non-tender. Not distended. Bowel sounds normal  Extremities:     No cyanosis. No clubbing,                            Skin turgor normal, Capillary refill normal  Skin:                Not pale. Not Jaundiced  No rashes   Psych:             Not anxious or agitated.   Neurologic:      Alert, moves all extremities, answers questions appropriately and responds to commands       CHRONIC MEDICAL DIAGNOSES:  Problem List as of 11/2/2019 Never Reviewed          Codes Class Noted - Resolved    SIRS (systemic inflammatory response syndrome) ([facility]) ICD-10-CM: R65.10  ICD-9-CM: 995.90  10/30/2019 - Present        Prolonged QT interval ICD-10-CM: R94.31  ICD-9-CM: 794.31  10/30/2019 - Present        GISELLA (acute kidney injury) ([facility] ICD-10-CM: N17.9  ICD-9-CM: 584.9  10/30/2019 - Present        Thrombocytopenia ([facility] ICD-10-CM: D69.6  ICD-9-CM: 287.5  10/30/2019 - Present        Hypokalemia ICD-10-CM: E87.6  ICD-9-CM: 276.8  10/30/2019 - Present        Hypophosphatemia ICD-10-CM: E83.39  ICD-9-CM: 275.3  10/30/2019 - Present        Hyponatremia ICD-10-CM: E87.1  ICD-9-CM: 276.1  10/30/2019 - Present              Greater than 20  minutes were spent with the patient on counseling and coordination of care    Signed:   Altagracia Hernandez MD  11/2/2019  10:58 AM

## 2019-11-02 NOTE — PROGRESS NOTES
Problem: Learning/Teaching Needs - [facility]  Goal: *Ability to perform ADLs and demonstrates progressive mobility and function  Outcome: Progressing Towards Goal     Problem: Pressure Injury - Risk of  Goal: *Prevention of pressure injury  Description  Document Jason Scale and appropriate interventions in the flowsheet. Outcome: Progressing Towards Goal  Note:   Pressure Injury Interventions: Activity Interventions: Increase time out of bed    Mobility Interventions: Chair cushion    Nutrition Interventions: Document food/fluid/supplement intake, Offer support with meals,snacks and hydration                     Problem: Patient Education: Go to Patient Education Activity  Goal: Patient/Family Education  Outcome: Progressing Towards Goal     Problem: Falls - Risk of  Goal: *Absence of Falls  Description  Document Rocky Hoang Fall Risk and appropriate interventions in the flowsheet.   Outcome: Progressing Towards Goal  Note:   Fall Risk Interventions:            Medication Interventions: Teach patient to arise slowly                   Problem: Patient Education: Go to Patient Education Activity  Goal: Patient/Family Education  Outcome: Progressing Towards Goal

## 2019-11-02 NOTE — PROGRESS NOTES
Spiritual Care Partner Volunteer visited patient in Rm 615 on 11/2/19. Documented by:   Chaplain Ch MDiv, MACE  [phone] PRAY ([phone])

## 2019-11-02 NOTE — PROGRESS NOTES
I have reviewed discharge instructions with the patient & her mother to include no new DC medications and following up with her PCP within 7-10 days. .  Both verbalized understanding of all DC instructions

## 2019-11-04 LAB
BACTERIA SPEC CULT: ABNORMAL
SERVICE CMNT-IMP: ABNORMAL
SERVICE CMNT-IMP: ABNORMAL

## 2019-11-05 ENCOUNTER — OFFICE VISIT (OUTPATIENT)
Dept: FAMILY MEDICINE CLINIC | Age: 24
End: 2019-11-05

## 2019-11-05 VITALS
TEMPERATURE: 98.2 F | WEIGHT: 128.4 LBS | SYSTOLIC BLOOD PRESSURE: 118 MMHG | RESPIRATION RATE: 18 BRPM | OXYGEN SATURATION: 98 % | HEART RATE: 74 BPM | BODY MASS INDEX: 23.63 KG/M2 | DIASTOLIC BLOOD PRESSURE: 72 MMHG | HEIGHT: 62 IN

## 2019-11-05 DIAGNOSIS — Z13.220 SCREENING FOR HYPERLIPIDEMIA: ICD-10-CM

## 2019-11-05 DIAGNOSIS — E87.1 HYPONATREMIA: ICD-10-CM

## 2019-11-05 DIAGNOSIS — N17.9 AKI (ACUTE KIDNEY INJURY) (HCC): ICD-10-CM

## 2019-11-05 DIAGNOSIS — Z00.00 ROUTINE GENERAL MEDICAL EXAMINATION AT A HEALTH CARE FACILITY: ICD-10-CM

## 2019-11-05 DIAGNOSIS — B27.90 EBV INFECTION: Primary | ICD-10-CM

## 2019-11-05 DIAGNOSIS — D69.6 THROMBOCYTOPENIA (HCC): ICD-10-CM

## 2019-11-05 DIAGNOSIS — E83.39 HYPOPHOSPHATEMIA: ICD-10-CM

## 2019-11-05 DIAGNOSIS — E87.6 HYPOKALEMIA: ICD-10-CM

## 2019-11-05 DIAGNOSIS — R94.31 PROLONGED QT INTERVAL: ICD-10-CM

## 2019-11-05 NOTE — PROGRESS NOTES
Mikel Baron is a 30y.o. year old female who is a new patient to me today. She was previously followed by urgent care    Subjective:  CC: \"hospital follow up\"    HPI:  Hospital Follow Up for Mononucleosis  Patient Contacted within 2 days of discharge: NO  Admit Date:  10/30/19  Discharge Date: 11/2/2019  Admitting Diagnosis: Acute viral like illness  Discharge Diagnosis: Mononucleosis  Hospital Course:   · Patient presented to the ED with symptoms of worsening fevers, chills, sore throat and cervical adenopathy. Initial Mono, strep, and Influenza screen was negative. CMP was s/f Na of 130, K+ of 3.2, and creat of 1.56. Her CBC was s/f platelets of 319 with WBC of 10.2. EBV titers with positive for igG of 54.2. CMV was negative. HIV was nonreactive with negative PCR. Respiratory viral panel was negative. Throat culture was negative. UA showed trace LE, but negative urine culture. EBV nuclear antigen Ab was positive at 70.0. Hepatitis panel was neg. She had an EKG that was s/f prolonged QTc. She had a CXR that was normal. She had an abdominal US that showed hepatomegaly and hepatic steatosis, splenomegaly, and a small amount of gallbladder sludge with an adherant stone. She had NM hepatobiliary evaluation that was normal. The patient was treated with fluids and rest, and her condition gradually improve. She was discharged with PCP follow up. Medication Changes on Discharge: reviewed medicaitons with patient and no medications prescribed or changed on discharge. Follow up labs/tests: no pending labs on discharge  Additional Tests as Outpatient: Will repeat CBC, CMP, and phosphorus for resolution  Specialist Followup: none  Interval History: the patient reports that her neck is still a little swollen, but she feels that her condition has improved significantly since discharge.  She endorses having some mild deconditioning, but she denies any dyspnea or CP.       ROS:  Comprehensive ROS negative except as discussed in HPI      Allergies: reviewed and updated as appropriate  Allergies   Allergen Reactions    Sulfa (Sulfonamide Antibiotics) Hives        Home Medications: reviewed and updated to reflect today's final medication list  Current Outpatient Medications   Medication Sig    norethindrone-ethinyl estradiol (JUNEL FE 1/20, 28,) 1 mg-20 mcg (21)/75 mg (7) tab Take 1 Tab by mouth daily.  ibuprofen (ADVIL) 200 mg tablet Take 200 mg by mouth every six (6) hours as needed for Pain. No current facility-administered medications for this visit. Patient Care Team:  Patient Care Team:  None as PCP - General    PMH: reviewed and updated as appropriate  History reviewed. No pertinent past medical history. PSH: reviewed and updated as appropriate  History reviewed. No pertinent surgical history.     FH: reviewed and updated as appropriate  Family History   Problem Relation Age of Onset    Stroke Maternal Grandfather     Cancer Neg Hx     Diabetes Neg Hx     Heart Disease Neg Hx         SH: reviewed and updated as appropriate  Social History     Tobacco Use    Smoking status: Never Smoker    Smokeless tobacco: Never Used   Substance Use Topics    Alcohol use: Never     Frequency: Never    Drug use: Never   Occupation: work at call center, account coordination  Home: lives by self      Objective:   Visit Vitals  /72 (BP 1 Location: Left arm, BP Patient Position: Sitting)   Pulse 74   Temp 98.2 °F (36.8 °C) (Oral)   Resp 18   Ht 5' 2\" (1.575 m)   Wt 128 lb 6.4 oz (58.2 kg)   LMP 10/14/2019 (Approximate)   SpO2 98%   BMI 23.48 kg/m²         Physical Exam:  General appearance: NAD, conversant   Eyes: anicteric sclerae, moist conjunctivae; no lid-lag; PERRLA  HENT: Atraumatic; oropharynx clear with moist mucous membranes and no mucosal ulcerations; normal hard and soft palate  Neck: Trachea midline; FROM, supple, no thyromegaly, mild-moderate anterior cervical lymphadenopathy  Lungs: CTA, with normal respiratory effort and no intercostal retractions  CV: Regular rate, normal rhythm, no JVD, no MRGs   Abdomen: Soft, non-tender; no masses or HSM  Extremities: No peripheral edema or extremity lymphadenopathy  Skin: Normal temperature, turgor and texture; no rash, ulcers or subcutaneous nodules  Neuro: CN 2-12 grossly intact. DTRs 2+ and symmetrical throughout. Stable gait. Psych: Appropriate affect, alert and oriented to person, place and time      Assessment & Plan:  1. EBV infection: symptoms have improved and patient is recovering  - discussed reasons to call or go to ED  - will f/u for resolution of laboratory derangements as follows    2. GISELLA (acute kidney injury) [facility]): patient had GISELLA on admission that improved with fluids. Will obtain CMP to evaluate for full resolution  - CMP    3. Prolonged QT interval: patient had prolonged QTc on admission. Will obtain repeat EKG in 4-6 weeks  - repeat EKG in 4-6 weeks    4. Thrombocytopenia ([address]):   - CBC WITH AUTOMATED DIFF    5. Hypokalemia  - METABOLIC PANEL, COMPREHENSIVE    6. Hypophosphatemia  - PHOSPHORUS    7. Hyponatremia  - METABOLIC PANEL, COMPREHENSIVE    8. Screening for hyperlipidemia  - METABOLIC PANEL, COMPREHENSIVE  - LIPID PANEL    9. Routine general medical examination at a health care facility: patient would like referral to obgyn for routine well-woman care. Will provide  - REFERRAL TO OBSTETRICS AND GYNECOLOGY      I have discussed the diagnosis with the patient and the intended plan as seen in the above orders. The patient has received an after-visit summary along with patient information handout. I have discussed medication side effects and warnings with the patient as well. Follow-up and Dispositions    · Return in about 4 weeks (around 12/3/2019), or if symptoms worsen or fail to improve, for EKG for prolonged QTc.            Hamilton Hall MD

## 2019-11-05 NOTE — PROGRESS NOTES
Chief Complaint   Patient presents with    New Patient     Patient to establish care with provider. [facility] Follow Up     hospital follow up     1. Have you been to the ER, urgent care clinic since your last visit? Hospitalized since your last visit? Yes When: See ENcounters     2. Have you seen or consulted any other health care providers outside of the [address] [place] [address] since your last visit? Include any pap smears or colon screening. No     Last Pap: approx 2 years ago. No specialist.  Last Primary: [facility].

## 2019-11-05 NOTE — PATIENT INSTRUCTIONS
Mononucleosis: Care Instructions  Your Care Instructions    Mononucleosis, also called mono, is an infection that is usually caused by the Ambrose-Barr virus. Mono is spread through contact with saliva, mucus from the nose and throat, and sometimes tears or blood. You can get mono by kissing a person who is infected. Or you may get it by sharing a drinking glass or eating utensils with someone who has mono. That person may not be sick at the time or may have had mono long before. Mono may cause your spleen to swell. The spleen is an organ in the upper left side of your belly. A blow to the belly can cause a swollen spleen to break open. In very rare cases, the spleen may burst on its own. Most people recover fully after several weeks. But it may take several months before your normal energy is back. The lymph nodes in your neck may be larger than normal for up to 1 month. Getting lots of rest and keeping your schedule light will help you feel better. Time helps you recover. Follow-up care is a key part of your treatment and safety. Be sure to make and go to all appointments, and call your doctor if you are having problems. It's also a good idea to know your test results and keep a list of the medicines you take. How can you care for yourself at home? · Get plenty of rest. Stay in bed until you feel well enough to be up. · Drink plenty of fluids, enough so that your urine is light yellow or clear like water. If you have kidney, heart, or liver disease and have to limit fluids, talk with your doctor before you increase the amount of fluids you drink. · Take your medicines exactly as prescribed. Call your doctor if you think you are having a problem with your medicine. · For a sore throat, suck on lozenges or gargle with salt water. To make salt water, mix 1 teaspoon of salt in 8 ounces of warm water.   · Take an over-the-counter pain medicine, such as acetaminophen (Tylenol), ibuprofen (Advil, Motrin), or naproxen (Aleve) for a sore throat or headache or to lower a fever. Read and follow all instructions on the label. · Do not take two or more pain medicines at the same time unless the doctor told you to. Many pain medicines have acetaminophen, which is Tylenol. Too much acetaminophen (Tylenol) can be harmful. · Do not play contact sports for 4 weeks. Do not lift anything heavy. Too much activity increases the chance that your spleen may break open. · Try not to spread the virus to others. Do not kiss or share dishes, glasses, eating utensils, or toothbrushes for at least two weeks. The virus is spread when saliva from an infected person gets in another person's mouth. It is hard to know how long you may be contagious. · If you know you have mono, do not donate blood. There is a chance of spreading the virus through blood products. When should you call for help? Call 911 anytime you think you may need emergency care. For example, call if:    · You passed out (lost consciousness).    Call your doctor now or seek immediate medical care if:    · You have new or worse belly pain.     · You have signs of needing more fluids. You have sunken eyes and a dry mouth, and you pass only a little urine.     · You are dizzy or lightheaded, or you feel like you may faint.     · You cannot swallow fluids.    Watch closely for changes in your health, and be sure to contact your doctor if:    · You do not get better as expected. Where can you learn more? Go to http://avelino-rony.info/. Enter B890 in the search box to learn more about \"Mononucleosis: Care Instructions. \"  Current as of: June 9, 2019  Content Version: 12.2  © 7689-6610 [employer]. Care instructions adapted under license by [employer] (which disclaims liability or warranty for this information).  If you have questions about a medical condition or this instruction, always ask your healthcare professional. [employer] disclaims any warranty or liability for your use of this information.

## 2019-11-05 NOTE — LETTER
NOTIFICATION RETURN TO WORK / SCHOOL 
 
11/5/2019 11:15 AM 
 
Ms. Alan Dumas [address] [facility] [address] [phone] To Whom It May Concern: 
 
Alan Dumas is currently under the care of LINDA Constantino. She will return to work/school on: 11/7/2019 If there are questions or concerns please have the patient contact our office.  
 
 
 
Sincerely, 
 
 
Hamilton Hall MD

## 2019-11-06 ENCOUNTER — TELEPHONE (OUTPATIENT)
Dept: FAMILY MEDICINE CLINIC | Age: 24
End: 2019-11-06

## 2019-11-06 LAB
ALBUMIN SERPL-MCNC: 3.4 G/DL (ref 3.5–5.5)
ALBUMIN/GLOB SERPL: 1.1 {RATIO} (ref 1.2–2.2)
ALP SERPL-CCNC: 78 IU/L (ref 39–117)
ALT SERPL-CCNC: 22 IU/L (ref 0–32)
AST SERPL-CCNC: 16 IU/L (ref 0–40)
BASOPHILS # BLD AUTO: 0.1 X10E3/UL (ref 0–0.2)
BASOPHILS NFR BLD AUTO: 0 %
BILIRUB SERPL-MCNC: 0.3 MG/DL (ref 0–1.2)
BUN SERPL-MCNC: 7 MG/DL (ref 6–20)
BUN/CREAT SERPL: 15 (ref 9–23)
CALCIUM SERPL-MCNC: 9.1 MG/DL (ref 8.7–10.2)
CHLORIDE SERPL-SCNC: 101 MMOL/L (ref 96–106)
CHOLEST SERPL-MCNC: 152 MG/DL (ref 100–199)
CO2 SERPL-SCNC: 22 MMOL/L (ref 20–29)
CREAT SERPL-MCNC: 0.47 MG/DL (ref 0.57–1)
EOSINOPHIL # BLD AUTO: 0.2 X10E3/UL (ref 0–0.4)
EOSINOPHIL NFR BLD AUTO: 1 %
ERYTHROCYTE [DISTWIDTH] IN BLOOD BY AUTOMATED COUNT: 12.6 % (ref 12.3–15.4)
GLOBULIN SER CALC-MCNC: 3.1 G/DL (ref 1.5–4.5)
GLUCOSE SERPL-MCNC: 85 MG/DL (ref 65–99)
HCT VFR BLD AUTO: 32.5 % (ref 34–46.6)
HDLC SERPL-MCNC: 24 MG/DL
HGB BLD-MCNC: 10.8 G/DL (ref 11.1–15.9)
IMM GRANULOCYTES # BLD AUTO: 0.4 X10E3/UL (ref 0–0.1)
IMM GRANULOCYTES NFR BLD AUTO: 2 %
INTERPRETATION, 910389: NORMAL
LDLC SERPL CALC-MCNC: 94 MG/DL (ref 0–99)
LYMPHOCYTES # BLD AUTO: 2.4 X10E3/UL (ref 0.7–3.1)
LYMPHOCYTES NFR BLD AUTO: 13 %
MCH RBC QN AUTO: 27.9 PG (ref 26.6–33)
MCHC RBC AUTO-ENTMCNC: 33.2 G/DL (ref 31.5–35.7)
MCV RBC AUTO: 84 FL (ref 79–97)
MONOCYTES # BLD AUTO: 1.3 X10E3/UL (ref 0.1–0.9)
MONOCYTES NFR BLD AUTO: 7 %
NEUTROPHILS # BLD AUTO: 14.2 X10E3/UL (ref 1.4–7)
NEUTROPHILS NFR BLD AUTO: 77 %
PHOSPHATE SERPL-MCNC: 2.6 MG/DL (ref 2.5–4.5)
PLATELET # BLD AUTO: 425 X10E3/UL (ref 150–450)
POTASSIUM SERPL-SCNC: 3.8 MMOL/L (ref 3.5–5.2)
PROT SERPL-MCNC: 6.5 G/DL (ref 6–8.5)
RBC # BLD AUTO: 3.87 X10E6/UL (ref 3.77–5.28)
SODIUM SERPL-SCNC: 143 MMOL/L (ref 134–144)
TRIGL SERPL-MCNC: 172 MG/DL (ref 0–149)
VLDLC SERPL CALC-MCNC: 34 MG/DL (ref 5–40)
WBC # BLD AUTO: 18.6 X10E3/UL (ref 3.4–10.8)

## 2019-11-06 NOTE — TELEPHONE ENCOUNTER
11/06/19  3:14 PM    Called patient and discussed lab results. Will plan to recheck CBC in 2 weeks given elevated WBCs. Will also check EKG at that time given prolonged QTc during hospitalization.      Daja Rose MD

## 2019-11-06 NOTE — TELEPHONE ENCOUNTER
Attempted to call patient regarding lab results. Patient did not answer phone. Will try again at another time.      Jarrod Tellez MD

## 2019-11-07 LAB
ORGANISM ID BY MALDI, ORJ1T: NORMAL
SOURCE, RSRC67: NORMAL

## 2019-11-08 ENCOUNTER — APPOINTMENT (OUTPATIENT)
Dept: CT IMAGING | Age: 24
DRG: 871 | End: 2019-11-08
Attending: STUDENT IN AN ORGANIZED HEALTH CARE EDUCATION/TRAINING PROGRAM
Payer: COMMERCIAL

## 2019-11-08 ENCOUNTER — APPOINTMENT (OUTPATIENT)
Dept: CT IMAGING | Age: 24
DRG: 871 | End: 2019-11-08
Attending: HOSPITALIST
Payer: COMMERCIAL

## 2019-11-08 ENCOUNTER — HOSPITAL ENCOUNTER (INPATIENT)
Age: 24
LOS: 6 days | Discharge: HOME HEALTH | DRG: 871 | End: 2019-11-14
Attending: STUDENT IN AN ORGANIZED HEALTH CARE EDUCATION/TRAINING PROGRAM | Admitting: HOSPITALIST
Payer: COMMERCIAL

## 2019-11-08 ENCOUNTER — OFFICE VISIT (OUTPATIENT)
Dept: FAMILY MEDICINE CLINIC | Age: 24
End: 2019-11-08

## 2019-11-08 ENCOUNTER — TELEPHONE (OUTPATIENT)
Dept: FAMILY MEDICINE CLINIC | Age: 24
End: 2019-11-08

## 2019-11-08 VITALS
SYSTOLIC BLOOD PRESSURE: 98 MMHG | BODY MASS INDEX: 22.45 KG/M2 | RESPIRATION RATE: 16 BRPM | HEIGHT: 62 IN | DIASTOLIC BLOOD PRESSURE: 62 MMHG | OXYGEN SATURATION: 98 % | HEART RATE: 102 BPM | TEMPERATURE: 98.3 F | WEIGHT: 122 LBS

## 2019-11-08 DIAGNOSIS — J18.9 HCAP (HEALTHCARE-ASSOCIATED PNEUMONIA): ICD-10-CM

## 2019-11-08 DIAGNOSIS — J85.2 ABSCESS OF LOWER LOBE OF RIGHT LUNG WITHOUT PNEUMONIA (HCC): Primary | ICD-10-CM

## 2019-11-08 DIAGNOSIS — R10.31 RIGHT LOWER QUADRANT ABDOMINAL PAIN: Primary | ICD-10-CM

## 2019-11-08 DIAGNOSIS — R94.31 PROLONGED QT INTERVAL: ICD-10-CM

## 2019-11-08 LAB
ALBUMIN SERPL-MCNC: 3.2 G/DL (ref 3.5–5)
ALBUMIN/GLOB SERPL: 0.7 {RATIO} (ref 1.1–2.2)
ALP SERPL-CCNC: 87 U/L (ref 45–117)
ALT SERPL-CCNC: 26 U/L (ref 12–78)
ANION GAP SERPL CALC-SCNC: 8 MMOL/L (ref 5–15)
APPEARANCE UR: ABNORMAL
AST SERPL-CCNC: 14 U/L (ref 15–37)
BASOPHILS # BLD: 0 K/UL (ref 0–0.1)
BASOPHILS NFR BLD: 0 % (ref 0–1)
BILIRUB SERPL-MCNC: 0.4 MG/DL (ref 0.2–1)
BILIRUB UR QL: NEGATIVE
BUN SERPL-MCNC: 16 MG/DL (ref 6–20)
BUN/CREAT SERPL: 25 (ref 12–20)
CALCIUM SERPL-MCNC: 9.8 MG/DL (ref 8.5–10.1)
CHLORIDE SERPL-SCNC: 102 MMOL/L (ref 97–108)
CO2 SERPL-SCNC: 28 MMOL/L (ref 21–32)
COLOR UR: ABNORMAL
COMMENT, HOLDF: NORMAL
CREAT SERPL-MCNC: 0.64 MG/DL (ref 0.55–1.02)
DIFFERENTIAL METHOD BLD: ABNORMAL
EOSINOPHIL # BLD: 0.2 K/UL (ref 0–0.4)
EOSINOPHIL NFR BLD: 1 % (ref 0–7)
ERYTHROCYTE [DISTWIDTH] IN BLOOD BY AUTOMATED COUNT: 13.2 % (ref 11.5–14.5)
GLOBULIN SER CALC-MCNC: 4.7 G/DL (ref 2–4)
GLUCOSE SERPL-MCNC: 99 MG/DL (ref 65–100)
GLUCOSE UR STRIP.AUTO-MCNC: NEGATIVE MG/DL
HCG UR QL: NEGATIVE
HCT VFR BLD AUTO: 33.8 % (ref 35–47)
HGB BLD-MCNC: 10.7 G/DL (ref 11.5–16)
HGB UR QL STRIP: NEGATIVE
IMM GRANULOCYTES # BLD AUTO: 0.2 K/UL (ref 0–0.04)
IMM GRANULOCYTES NFR BLD AUTO: 1 % (ref 0–0.5)
KETONES UR QL STRIP.AUTO: NEGATIVE MG/DL
LACTATE BLD-SCNC: 1.65 MMOL/L (ref 0.4–2)
LEUKOCYTE ESTERASE UR QL STRIP.AUTO: NEGATIVE
LIPASE SERPL-CCNC: 182 U/L (ref 73–393)
LYMPHOCYTES # BLD: 2.2 K/UL (ref 0.8–3.5)
LYMPHOCYTES NFR BLD: 10 % (ref 12–49)
MCH RBC QN AUTO: 28.6 PG (ref 26–34)
MCHC RBC AUTO-ENTMCNC: 31.7 G/DL (ref 30–36.5)
MCV RBC AUTO: 90.4 FL (ref 80–99)
MONOCYTES # BLD: 1.6 K/UL (ref 0–1)
MONOCYTES NFR BLD: 7 % (ref 5–13)
NEUTS SEG # BLD: 18.2 K/UL (ref 1.8–8)
NEUTS SEG NFR BLD: 81 % (ref 32–75)
NITRITE UR QL STRIP.AUTO: NEGATIVE
NRBC # BLD: 0 K/UL (ref 0–0.01)
NRBC BLD-RTO: 0 PER 100 WBC
PH UR STRIP: 7.5 [PH] (ref 5–8)
PLATELET # BLD AUTO: 478 K/UL (ref 150–400)
PMV BLD AUTO: 9.8 FL (ref 8.9–12.9)
POTASSIUM SERPL-SCNC: 4.2 MMOL/L (ref 3.5–5.1)
PROT SERPL-MCNC: 7.9 G/DL (ref 6.4–8.2)
PROT UR STRIP-MCNC: NEGATIVE MG/DL
RBC # BLD AUTO: 3.74 M/UL (ref 3.8–5.2)
RBC MORPH BLD: ABNORMAL
SAMPLES BEING HELD,HOLD: NORMAL
SODIUM SERPL-SCNC: 138 MMOL/L (ref 136–145)
SP GR UR REFRACTOMETRY: 1.02 (ref 1–1.03)
UR CULT HOLD, URHOLD: NORMAL
UROBILINOGEN UR QL STRIP.AUTO: 1 EU/DL (ref 0.2–1)
WBC # BLD AUTO: 22.4 K/UL (ref 3.6–11)
WBC MORPH BLD: ABNORMAL

## 2019-11-08 PROCEDURE — 77030027138 HC INCENT SPIROMETER -A

## 2019-11-08 PROCEDURE — 65270000029 HC RM PRIVATE

## 2019-11-08 PROCEDURE — 80053 COMPREHEN METABOLIC PANEL: CPT

## 2019-11-08 PROCEDURE — 74011000258 HC RX REV CODE- 258: Performed by: STUDENT IN AN ORGANIZED HEALTH CARE EDUCATION/TRAINING PROGRAM

## 2019-11-08 PROCEDURE — 71250 CT THORAX DX C-: CPT

## 2019-11-08 PROCEDURE — 83605 ASSAY OF LACTIC ACID: CPT

## 2019-11-08 PROCEDURE — 87086 URINE CULTURE/COLONY COUNT: CPT

## 2019-11-08 PROCEDURE — 74011636320 HC RX REV CODE- 636/320: Performed by: RADIOLOGY

## 2019-11-08 PROCEDURE — 74011250637 HC RX REV CODE- 250/637: Performed by: HOSPITALIST

## 2019-11-08 PROCEDURE — 81003 URINALYSIS AUTO W/O SCOPE: CPT

## 2019-11-08 PROCEDURE — 81025 URINE PREGNANCY TEST: CPT

## 2019-11-08 PROCEDURE — 85025 COMPLETE CBC W/AUTO DIFF WBC: CPT

## 2019-11-08 PROCEDURE — 74177 CT ABD & PELVIS W/CONTRAST: CPT

## 2019-11-08 PROCEDURE — 74011000258 HC RX REV CODE- 258: Performed by: RADIOLOGY

## 2019-11-08 PROCEDURE — 96375 TX/PRO/DX INJ NEW DRUG ADDON: CPT

## 2019-11-08 PROCEDURE — 83690 ASSAY OF LIPASE: CPT

## 2019-11-08 PROCEDURE — 74011250636 HC RX REV CODE- 250/636: Performed by: HOSPITALIST

## 2019-11-08 PROCEDURE — 96374 THER/PROPH/DIAG INJ IV PUSH: CPT

## 2019-11-08 PROCEDURE — 36415 COLL VENOUS BLD VENIPUNCTURE: CPT

## 2019-11-08 PROCEDURE — 99285 EMERGENCY DEPT VISIT HI MDM: CPT

## 2019-11-08 PROCEDURE — 74011250636 HC RX REV CODE- 250/636: Performed by: STUDENT IN AN ORGANIZED HEALTH CARE EDUCATION/TRAINING PROGRAM

## 2019-11-08 RX ORDER — SODIUM CHLORIDE 0.9 % (FLUSH) 0.9 %
5-40 SYRINGE (ML) INJECTION EVERY 8 HOURS
Status: DISCONTINUED | OUTPATIENT
Start: 2019-11-08 | End: 2019-11-14 | Stop reason: HOSPADM

## 2019-11-08 RX ORDER — DIPHENHYDRAMINE HCL 25 MG
25 CAPSULE ORAL
Status: DISCONTINUED | OUTPATIENT
Start: 2019-11-08 | End: 2019-11-14 | Stop reason: HOSPADM

## 2019-11-08 RX ORDER — NALOXONE HYDROCHLORIDE 0.4 MG/ML
0.4 INJECTION, SOLUTION INTRAMUSCULAR; INTRAVENOUS; SUBCUTANEOUS AS NEEDED
Status: DISCONTINUED | OUTPATIENT
Start: 2019-11-08 | End: 2019-11-14 | Stop reason: HOSPADM

## 2019-11-08 RX ORDER — SODIUM CHLORIDE 0.9 % (FLUSH) 0.9 %
10 SYRINGE (ML) INJECTION
Status: COMPLETED | OUTPATIENT
Start: 2019-11-08 | End: 2019-11-08

## 2019-11-08 RX ORDER — VANCOMYCIN HYDROCHLORIDE
1250
Status: COMPLETED | OUTPATIENT
Start: 2019-11-08 | End: 2019-11-08

## 2019-11-08 RX ORDER — MORPHINE SULFATE 2 MG/ML
2 INJECTION, SOLUTION INTRAMUSCULAR; INTRAVENOUS
Status: COMPLETED | OUTPATIENT
Start: 2019-11-08 | End: 2019-11-08

## 2019-11-08 RX ORDER — SODIUM CHLORIDE 9 MG/ML
1000 INJECTION, SOLUTION INTRAVENOUS ONCE
Status: COMPLETED | OUTPATIENT
Start: 2019-11-08 | End: 2019-11-08

## 2019-11-08 RX ORDER — ACETAMINOPHEN 325 MG/1
650 TABLET ORAL
Status: DISCONTINUED | OUTPATIENT
Start: 2019-11-08 | End: 2019-11-14 | Stop reason: HOSPADM

## 2019-11-08 RX ORDER — HYDROCODONE BITARTRATE AND ACETAMINOPHEN 5; 325 MG/1; MG/1
1 TABLET ORAL
Status: DISCONTINUED | OUTPATIENT
Start: 2019-11-08 | End: 2019-11-14 | Stop reason: HOSPADM

## 2019-11-08 RX ORDER — MORPHINE SULFATE 2 MG/ML
4 INJECTION, SOLUTION INTRAMUSCULAR; INTRAVENOUS
Status: DISCONTINUED | OUTPATIENT
Start: 2019-11-08 | End: 2019-11-09

## 2019-11-08 RX ORDER — ONDANSETRON 2 MG/ML
4 INJECTION INTRAMUSCULAR; INTRAVENOUS
Status: DISCONTINUED | OUTPATIENT
Start: 2019-11-08 | End: 2019-11-14 | Stop reason: HOSPADM

## 2019-11-08 RX ORDER — KETOROLAC TROMETHAMINE 30 MG/ML
30 INJECTION, SOLUTION INTRAMUSCULAR; INTRAVENOUS
Status: DISPENSED | OUTPATIENT
Start: 2019-11-08 | End: 2019-11-13

## 2019-11-08 RX ORDER — ACETAMINOPHEN 325 MG/1
325 TABLET ORAL
COMMUNITY
End: 2020-10-02

## 2019-11-08 RX ORDER — SODIUM CHLORIDE 0.9 % (FLUSH) 0.9 %
5-40 SYRINGE (ML) INJECTION AS NEEDED
Status: DISCONTINUED | OUTPATIENT
Start: 2019-11-08 | End: 2019-11-14 | Stop reason: HOSPADM

## 2019-11-08 RX ADMIN — HYDROCODONE BITARTRATE AND ACETAMINOPHEN 1 TABLET: 5; 325 TABLET ORAL at 19:16

## 2019-11-08 RX ADMIN — IOPAMIDOL 100 ML: 755 INJECTION, SOLUTION INTRAVENOUS at 17:16

## 2019-11-08 RX ADMIN — PIPERACILLIN SODIUM AND TAZOBACTAM SODIUM 3.38 G: 3; .375 INJECTION, POWDER, LYOPHILIZED, FOR SOLUTION INTRAVENOUS at 17:58

## 2019-11-08 RX ADMIN — VANCOMYCIN HYDROCHLORIDE 1250 MG: 10 INJECTION, POWDER, LYOPHILIZED, FOR SOLUTION INTRAVENOUS at 20:00

## 2019-11-08 RX ADMIN — Medication 10 ML: at 17:16

## 2019-11-08 RX ADMIN — KETOROLAC TROMETHAMINE 30 MG: 30 INJECTION, SOLUTION INTRAMUSCULAR at 19:16

## 2019-11-08 RX ADMIN — SODIUM CHLORIDE 100 ML: 900 INJECTION, SOLUTION INTRAVENOUS at 17:16

## 2019-11-08 RX ADMIN — ACETAMINOPHEN 650 MG: 325 TABLET, FILM COATED ORAL at 19:59

## 2019-11-08 RX ADMIN — SODIUM CHLORIDE 1000 ML: 900 INJECTION, SOLUTION INTRAVENOUS at 17:28

## 2019-11-08 RX ADMIN — MORPHINE SULFATE 2 MG: 2 INJECTION, SOLUTION INTRAMUSCULAR; INTRAVENOUS at 17:27

## 2019-11-08 NOTE — ED TRIAGE NOTES
Mono diagnosed on Friday spent 4 days in the hospital. She is having stabbing pain under right rib cage that radiates to shoulder \"collar bone\". She is sent by PCP who is concerned about appy.

## 2019-11-08 NOTE — PROGRESS NOTES
Chief Complaint   Patient presents with    Abdominal Pain     right side, x 1 day      1. Have you been to the ER, urgent care clinic since your last visit? Hospitalized since your last visit? No    2. Have you seen or consulted any other health care providers outside of the [address] [place] [address] since your last visit? Include any pap smears or colon screening.  No

## 2019-11-08 NOTE — TELEPHONE ENCOUNTER
Outbound call to patient. Patient states that she is having some increased stomach pain, and would like to follow up with Dr. Ortiz Pineda. Writer verbalized understanding and scheduled patient with Dr. Ortiz Pineda.

## 2019-11-08 NOTE — PROGRESS NOTES
Admission Medication Reconciliation:    Information obtained from:  Patient  RxQuery data available¹:  YES    Comments/Recommendations: Updated PTA meds/reviewed patient's allergies. Patient and her Mother provided information re: medications and allergies. No changes except to update administration times. Thank you for allowing me to participate in the care of your patient. Ronna Alegria PharmD, RN #[id number]       [facility] benefit data reflects medications filled and processed through the patient's insurance, however   this data does NOT capture whether the medication was picked up or is currently being taken by the patient. Allergies:  Sulfa (sulfonamide antibiotics)    Significant PMH/Disease States: No past medical history on file. Chief Complaint for this Admission:    Chief Complaint   Patient presents with    Rib Pain     Prior to Admission Medications:   Prior to Admission Medications   Prescriptions Last Dose Informant Patient Reported? Taking?   acetaminophen (TYLENOL) 325 mg tablet 11/1/2019 at Unknown time  Yes Yes   Sig: Take 325 mg by mouth every four (4) hours as needed for Pain. ibuprofen (ADVIL) 200 mg tablet 11/8/2019 at Unknown time  Yes Yes   Sig: Take 200 mg by mouth every six (6) hours as needed for Pain. norethindrone-ethinyl estradiol (JUNEL FE 1/20, 28,) 1 mg-20 mcg (21)/75 mg (7) tab 11/7/2019 at Unknown time  Yes Yes   Sig: Take 1 Tab by mouth daily. Facility-Administered Medications: None       Please contact the main inpatient pharmacy with any questions or concerns at [phone] and we will direct you to the clinical pharmacist covering this patient's care while in-house.    Landon Coleman [place]

## 2019-11-08 NOTE — ED PROVIDER NOTES
Patient is a 18-year-old female recently admitted for EBV here today secondary to right upper quadrant abdominal pain. She reports that she started with the pain yesterday and described as sharp, stabbing pain that radiates to the right shoulder. It is worse when she coughs or breathes. It is also worse with moving. Rest makes it better. She has not had nausea, vomiting, diarrhea. No skin color change. No change in eye color. No fevers or chills. Denies shortness of breath or chest pain. She saw her PCP today who was concerned about appendicitis so he sent her in, although she denies any right lower quadrant abdominal pain. No history of surgery to the abdomen. Reports that when she was admitted recently she was told her liver was enlarged.              Family History:   Problem Relation Age of Onset    Stroke Maternal Grandfather     Cancer Neg Hx     Diabetes Neg Hx     Heart Disease Neg Hx        Social History     Socioeconomic History    Marital status: SINGLE     Spouse name: Not on file    Number of children: Not on file    Years of education: Not on file    Highest education level: Not on file   Occupational History    Not on file   Social Needs    Financial resource strain: Not on file    Food insecurity:     Worry: Not on file     Inability: Not on file    Transportation needs:     Medical: Not on file     Non-medical: Not on file   Tobacco Use    Smoking status: Never Smoker    Smokeless tobacco: Never Used   Substance and Sexual Activity    Alcohol use: Never     Frequency: Never    Drug use: Never    Sexual activity: Not Currently     Birth control/protection: Pill   Lifestyle    Physical activity:     Days per week: Not on file     Minutes per session: Not on file    Stress: Not on file   Relationships    Social connections:     Talks on phone: Not on file     Gets together: Not on file     Attends Muslim service: Not on file     Active member of club or organization: Not on file     Attends meetings of clubs or organizations: Not on file     Relationship status: Not on file    Intimate partner violence:     Fear of current or ex partner: Not on file     Emotionally abused: Not on file     Physically abused: Not on file     Forced sexual activity: Not on file   Other Topics Concern    Not on file   Social History Narrative    Not on file         ALLERGIES: Sulfa (sulfonamide antibiotics)    Review of Systems   Constitutional: Negative for chills and fever. HENT: Negative for congestion and rhinorrhea. Eyes: Negative for redness and visual disturbance. Respiratory: Negative for cough and shortness of breath. Cardiovascular: Negative for chest pain and leg swelling. Gastrointestinal: Positive for abdominal pain. Negative for diarrhea, nausea and vomiting. Genitourinary: Negative for dysuria, flank pain, frequency, hematuria and urgency. Musculoskeletal: Positive for arthralgias. Negative for back pain, myalgias and neck pain. Skin: Negative for rash and wound. Allergic/Immunologic: Negative for immunocompromised state. Neurological: Negative for dizziness and headaches. Vitals:    11/08/19 1459   BP: 121/72   Pulse: (!) 106   Resp: 18   Temp: 98.4 °F (36.9 °C)   SpO2: 99%            Physical Exam   Constitutional: She is oriented to person, place, and time. She appears well-developed and well-nourished. No distress. HENT:   Head: Normocephalic. Mouth/Throat: Oropharynx is clear and moist. No oropharyngeal exudate. Eyes: Pupils are equal, round, and reactive to light. EOM are normal. Right eye exhibits no discharge. Left eye exhibits no discharge. Neck: Normal range of motion. Neck supple. Cardiovascular: Normal rate, regular rhythm, normal heart sounds and intact distal pulses. Exam reveals no gallop and no friction rub. No murmur heard. Pulmonary/Chest: Effort normal and breath sounds normal. No stridor. No respiratory distress.  She has no wheezes. She has no rales. Abdominal: Soft. Bowel sounds are normal. She exhibits no distension. There is tenderness (RUQ with guarding). There is no rebound and no guarding. Musculoskeletal: Normal range of motion. She exhibits no edema or deformity. Neurological: She is alert and oriented to person, place, and time. Skin: Skin is warm and dry. Capillary refill takes less than 2 seconds. No rash noted. She is not diaphoretic. No jaundice   Psychiatric: She has a normal mood and affect. Her behavior is normal.   Nursing note and vitals reviewed. Prior Records Reviewed:   Recent admit with EBV  10/30 had HIDA with normal gb and hepatobiliary imaging  RUQ US showed hepatosplenomegaly with normal ducts--small amount of gb sludge and adherent stone/sludge ball    Labs Reviewed:   Marked leukocytosis with L shift and toxic granulocytes  Mild baseline anemia  Normal renal function and electrolytes  LFT look ok  Lactate      Imaging Reviewed:   CT shows suspected RLL pulmonary abscess with surrounding patchy infiltrate      Course:  Vancomycin and Zosyn ordered--CT shows pulmonary abscess with surrounding infiltration    Hospitalist Perfect Serve for Admission  5:55 PM    ED Room Number: ER19/19  Patient Name and age:  Shirley Sams 30 y.o.  female  Working Diagnosis:   1. Abscess of lower lobe of right lung without pneumonia ([address]    2. HCAP (healthcare-associated pneumonia)      Readmission: yes  Isolation Requirements:  no  Recommended Level of Care:  med/surg  Code Status:  Full Code  Department:[facility] Adult ED - [phone]  Other:  30 y.o. female recent admit for EBV, here with severe RUQ/R chest pain found to have pulmonary abscess with pneumonia. VS stable. MDM:  30 y.o. female recent admit for EBV and possible sepsis here today with 1 day of severe RUQ pain. Mild tachycardia but stable VS otherwise. Considered biliary pathology however LFT ok and recent US/HIDA ok.  CT ordered to rule out other acute pathology such as hepatic hemorrhage, sbo, perforation, etc and she was found to have a RLL fluid collection suspicious for abscess. She does have a marked leukocytosis with toxic granulocytes. Blood cultures sent. Started on broad spectrum abx. No O2 requirement. At this time I do not feel she is septic. Will admit for further workup. Clinical Impression:     ICD-10-CM ICD-9-CM    1. Abscess of lower lobe of right lung without pneumonia ([facility] [address] J85.2 513.0    2.  HCAP (healthcare-associated pneumonia) J18.9 486            Disposition: Admit  Ismael Varela DO

## 2019-11-08 NOTE — PROGRESS NOTES
Mikal Cortez  30 y.o. female  1995  [address] [place] [address]  [id number]     LINDA Constantino       Encounter Date: 11/8/2019           Established Patient Visit Note: Allison Shelton MD    Reason for Appointment:  Chief Complaint   Patient presents with    Abdominal Pain     right side, x 1 day        History of Present Illness:  History provided by patient    Mikal Cortez is a 30 y.o. female who presents to clinic today for:    Abdominal Pain  Duration; 1 day  Location: RLQ shooting up to right collar bone  Severity: 810  Character: Sharp, stabbing  Timing: comes and goes lasting a few seconds  Frequency: several times per day  MF: worse with reaching out or taking a deep breath, better with rest  AS: denies any f/c, n/v, d/c, sob/cp      Review of Systems  Const: denies fatigue, denies fever, denies chills  Cardiac: denies palpitations or chest pain  Resp: denies dyspnea, denies wheezing        Allergies: Sulfa (sulfonamide antibiotics)    Medications: (Updated to reflect final medication list after visit)    Current Outpatient Medications:     acetaminophen (TYLENOL) 325 mg tablet, Take  by mouth every four (4) hours as needed for Pain., Disp: , Rfl:     norethindrone-ethinyl estradiol (JUNEL FE 1/20, 28,) 1 mg-20 mcg (21)/75 mg (7) tab, Take 1 Tab by mouth daily. , Disp: , Rfl:     ibuprofen (ADVIL) 200 mg tablet, Take 200 mg by mouth every six (6) hours as needed for Pain., Disp: , Rfl:     History  History reviewed. No pertinent past medical history. History reviewed. No pertinent surgical history.   Family History   Problem Relation Age of Onset    Stroke Maternal Grandfather     Cancer Neg Hx     Diabetes Neg Hx     Heart Disease Neg Hx      Social History     Tobacco Use    Smoking status: Never Smoker    Smokeless tobacco: Never Used   Substance Use Topics    Alcohol use: Never     Frequency: Never       Health Maintenance  Health Maintenance Due Topic Date Due    HPV Age 9Y-34Y (1 - Female 3-dose series) 03/29/2008    DTaP/Tdap/Td series (1 - Tdap) 03/29/2014    PAP AKA CERVICAL CYTOLOGY  03/29/2014       Objective:   Visit Vitals  BP 98/62   Pulse (!) 102   Temp 98.3 °F (36.8 °C) (Oral)   Resp 16   Ht 5' 2\" (1.575 m)   Wt 122 lb (55.3 kg)   LMP 10/14/2019 (Approximate)   SpO2 98%   BMI 22.31 kg/m²     Physical Exam:  Physical Exam   Constitutional: She is well-developed, well-nourished, and in no distress. HENT:   Head: Normocephalic and atraumatic. Nose: Nose normal.   Mouth/Throat: Oropharynx is clear and moist. No oropharyngeal exudate. Eyes: Pupils are equal, round, and reactive to light. Conjunctivae and EOM are normal. Right eye exhibits no discharge. Left eye exhibits no discharge. No scleral icterus. Neck: Normal range of motion. Neck supple. No JVD present. No tracheal deviation present. No thyromegaly present. Cardiovascular: Normal rate, regular rhythm, normal heart sounds and intact distal pulses. Exam reveals no gallop and no friction rub. No murmur heard. Pulmonary/Chest: Effort normal and breath sounds normal. No stridor. No respiratory distress. She has no wheezes. Abdominal: Soft. Normal appearance and bowel sounds are normal. She exhibits no distension and no mass. There is tenderness in the right lower quadrant. There is tenderness at McBurney's point. There is no rebound, no guarding and negative Sanders's sign. Musculoskeletal: Normal range of motion. She exhibits no edema, tenderness or deformity. Lymphadenopathy:     She has no cervical adenopathy. Neurological: She is alert. She displays normal reflexes. No cranial nerve deficit. Gait normal. Coordination normal.   Skin: Skin is warm and dry. Assessment & Plan:  1. Right lower quadrant abdominal pain: New RLQ Pain in setting of recent hospital admission for SEPSIS with elevated WBC. Will send patient to ED for CT of Abd and labs.  She and her mother are in agreement.   - go to ED for further evaluation        I have discussed the diagnosis with the patient and the intended plan as seen in the above orders. The patient has received an after-visit summary along with patient information handout. I have discussed medication side effects and warnings with the patient as well. Dispostion  Follow-up and Dispositions    · Return for go to emergency room.            Faisal Witt MD

## 2019-11-08 NOTE — H&P
History & Physical    Primary Care Provider: None  Source of Information: Patient     History of Presenting Illness:   Retia Frankel is a 30 y.o. female who presents with right lower chest pain     Patient is a 28-year-old female recently admitted for mononucleosis from 10/20 to 11/22. At that time patient was having    rt cervicl/submad adenopahty, severe sore throat,  and hepatosplenomegaly. Since discharge, her above symptoms are much improved, no longer have sore throat. She developed severe right lower chest and right upper quadrant yesterday,  abdominal pain. described as sharp, stabbing pain that radiates to the right shoulder, severe, 10/10. It is worse when she coughs or breathes. It is also worse with moving. Rest makes it better. She has not had nausea, vomiting, diarrhea. No skin color change. No change in eye color. No fevers or chills. She saw her PCP today who was concerned about appendicitis so he sent her to ER,  although she denies any right lower quadrant abdominal pain. No history of surgery to the abdomen.     was having trouble swallowing food last week, now resolved. Review of Systems:  General: HPI, denied fever, no changes of appetite or sleep  HEENT: no headache, no vision changes, no nose discharge, no hearing changes   RES: no wheezing, hpi   CVS: no cp, no palpitation. Muscular: no joint swelling, no muscle pain, no leg swelling  Skin: no rash, no itching   GI: no vomiting, no diarrhea  : no dysuria, no hematuria  Hemo: no gum bleeding, no petechial   Neuro: no sensation changes, no focal weakness   Endo: no polydipsia   Psych: denied depression     No past medical history on file. No past surgical history on file. Prior to Admission medications    Medication Sig Start Date End Date Taking? Authorizing Provider   acetaminophen (TYLENOL) 325 mg tablet Take 325 mg by mouth every four (4) hours as needed for Pain.    Yes Provider, Historical   norethindrone-ethinyl estradiol (JUNEL FE 1/20, 28,) 1 mg-20 mcg (21)/75 mg (7) tab Take 1 Tab by mouth daily. Yes Provider, Historical   ibuprofen (ADVIL) 200 mg tablet Take 200 mg by mouth every six (6) hours as needed for Pain. Yes Provider, Historical     Allergies   Allergen Reactions    Sulfa (Sulfonamide Antibiotics) Hives     11/8/19 \"Extreme hives\" per Mother      Family History   Problem Relation Age of Onset    Stroke Maternal Grandfather     Cancer Neg Hx     Diabetes Neg Hx     Heart Disease Neg Hx         SOCIAL HISTORY:  Patient resides:  Independently x   Assisted Living    SNF    With family care       Smoking history:   None x   Former    Chronic      Alcohol history:   None x   Social    Chronic      Ambulates:   Independently x   w/cane    w/walker    w/wc    CODE STATUS:  DNR    Full x   Other      Objective:     Physical Exam:     Visit Vitals  /77   Pulse 89   Temp 98.6 °F (37 °C)   Resp 17   LMP 10/14/2019 (Approximate)   SpO2 99%      O2 Device: Room air    General:  Alert, cooperative, no distress, appears stated age. Head:  Normocephalic, without obvious abnormality, atraumatic. Eyes:  Conjunctivae/corneas clear. PERRL, EOMs intact. Nose: Nares normal. Septum midline. Mucosa normal. No drainage or sinus tenderness. Throat: Lips, mucosa, and tongue normal. Teeth and gums normal.   Neck: Supple, symmetrical, trachea midline, no adenopathy, thyroid: no enlargement/tenderness/nodules, no carotid bruit and no JVD. Back:   Symmetric, no curvature. ROM normal. No CVA tenderness. Lungs:   Clear to auscultation bilaterally. Chest wall:  No tenderness or deformity. Heart:  Regular rate and rhythm, S1, S2 normal, no murmur, click, rub or gallop. Abdomen:   Soft, non-tender. Bowel sounds normal. No masses,  No organomegaly. Extremities: Extremities normal, atraumatic, no cyanosis or edema. Pulses: 2+ and symmetric all extremities.    Skin: Skin color, texture, turgor normal. No rashes or lesions   Neurologic: CNII-XII intact. Data Review:     Recent Days:  Recent Labs     11/08/19  1519   WBC 22.4*   HGB 10.7*   HCT 33.8*   *     Recent Labs     11/08/19  1519      K 4.2      CO2 28   GLU 99   BUN 16   CREA 0.64   CA 9.8   ALB 3.2*   SGOT 14*   ALT 26     No results for input(s): PH, PCO2, PO2, HCO3, FIO2 in the last 72 hours. 24 Hour Results:  Recent Results (from the past 24 hour(s))   CBC WITH AUTOMATED DIFF    Collection Time: 11/08/19  3:19 PM   Result Value Ref Range    WBC 22.4 (H) 3.6 - 11.0 K/uL    RBC 3.74 (L) 3.80 - 5.20 M/uL    HGB 10.7 (L) 11.5 - 16.0 g/dL    HCT 33.8 (L) 35.0 - 47.0 %    MCV 90.4 80.0 - 99.0 FL    MCH 28.6 26.0 - 34.0 PG    MCHC 31.7 30.0 - 36.5 g/dL    RDW 13.2 11.5 - 14.5 %    PLATELET 793 (H) 494 - 400 K/uL    MPV 9.8 8.9 - 12.9 FL    NRBC 0.0 0  WBC    ABSOLUTE NRBC 0.00 0.00 - 0.01 K/uL    NEUTROPHILS 81 (H) 32 - 75 %    LYMPHOCYTES 10 (L) 12 - 49 %    MONOCYTES 7 5 - 13 %    EOSINOPHILS 1 0 - 7 %    BASOPHILS 0 0 - 1 %    IMMATURE GRANULOCYTES 1 (H) 0.0 - 0.5 %    ABS. NEUTROPHILS 18.2 (H) 1.8 - 8.0 K/UL    ABS. LYMPHOCYTES 2.2 0.8 - 3.5 K/UL    ABS. MONOCYTES 1.6 (H) 0.0 - 1.0 K/UL    ABS. EOSINOPHILS 0.2 0.0 - 0.4 K/UL    ABS. BASOPHILS 0.0 0.0 - 0.1 K/UL    ABS. IMM.  GRANS. 0.2 (H) 0.00 - 0.04 K/UL    DF AUTOMATED      RBC COMMENTS NORMOCYTIC, NORMOCHROMIC      WBC COMMENTS TOXIC GRANULATION     METABOLIC PANEL, COMPREHENSIVE    Collection Time: 11/08/19  3:19 PM   Result Value Ref Range    Sodium 138 136 - 145 mmol/L    Potassium 4.2 3.5 - 5.1 mmol/L    Chloride 102 97 - 108 mmol/L    CO2 28 21 - 32 mmol/L    Anion gap 8 5 - 15 mmol/L    Glucose 99 65 - 100 mg/dL    BUN 16 6 - 20 MG/DL    Creatinine 0.64 0.55 - 1.02 MG/DL    BUN/Creatinine ratio 25 (H) 12 - 20      GFR est AA >60 >60 ml/min/1.73m2    GFR est non-AA >60 >60 ml/min/1.73m2    Calcium 9.8 8.5 - 10.1 MG/DL    Bilirubin, total 0.4 0.2 - 1.0 MG/DL    ALT (SGPT) 26 12 - 78 U/L    AST (SGOT) 14 (L) 15 - 37 U/L    Alk. phosphatase 87 45 - 117 U/L    Protein, total 7.9 6.4 - 8.2 g/dL    Albumin 3.2 (L) 3.5 - 5.0 g/dL    Globulin 4.7 (H) 2.0 - 4.0 g/dL    A-G Ratio 0.7 (L) 1.1 - 2.2     SAMPLES BEING HELD    Collection Time: 11/08/19  3:19 PM   Result Value Ref Range    SAMPLES BEING HELD 1 RED, 1 BABAK     COMMENT        Add-on orders for these samples will be processed based on acceptable specimen integrity and analyte stability, which may vary by analyte. LIPASE    Collection Time: 11/08/19  3:19 PM   Result Value Ref Range    Lipase 182 73 - 393 U/L   URINALYSIS W/ RFLX MICROSCOPIC    Collection Time: 11/08/19  4:00 PM   Result Value Ref Range    Color DARK YELLOW      Appearance CLOUDY (A) CLEAR      Specific gravity 1.018 1.003 - 1.030      pH (UA) 7.5 5.0 - 8.0      Protein NEGATIVE  NEG mg/dL    Glucose NEGATIVE  NEG mg/dL    Ketone NEGATIVE  NEG mg/dL    Bilirubin NEGATIVE  NEG      Blood NEGATIVE  NEG      Urobilinogen 1.0 0.2 - 1.0 EU/dL    Nitrites NEGATIVE  NEG      Leukocyte Esterase NEGATIVE  NEG     URINE CULTURE HOLD SAMPLE    Collection Time: 11/08/19  4:00 PM   Result Value Ref Range    Urine culture hold        URINE ON HOLD IN MICROBIOLOGY DEPT FOR 3 DAYS. IF UNPRESERVED URINE IS SUBMITTED, IT CANNOT BE USED FOR ADDITIONAL TESTING AFTER 24 HRS, RECOLLECTION WILL BE REQUIRED. HCG URINE, QL. - POC    Collection Time: 11/08/19  4:33 PM   Result Value Ref Range    Pregnancy test,urine (POC) NEGATIVE  NEG     POC LACTIC ACID    Collection Time: 11/08/19  5:36 PM   Result Value Ref Range    Lactic Acid (POC) 1.65 0.40 - 2.00 mmol/L         Imaging:   Ct Abd Pelv W Cont    Result Date: 11/8/2019  IMPRESSION: 2.3 cm x 2.3 cm x 1.2 cm peripheral inferior right lower lobe fluid collection likely to represent a pulmonary abscess. Trace right pleural fluid. Recommend follow-up to complete resolution.       Assessment:     Active Problems:    Lung abscess ([facility] [address] (11/8/2019)           Plan:     1. Lung abscess: CXR on 10/30 was clear. She might developed pneumonia after her EBV infection. ? Aspiration when she was having severe sore throat. Denied IV drug use. Will get chest CT for eval the whole lung. Continue IV vanco and zosyn. No sputum, unable to get sample for culture. Pulmonologist consult, ? IR vs TS to drain the abscess. Further plan will be decide based on the chest CT. 2. Hepatomegaly and . Splenomegaly: due to mononucleosis, much improved.       Ambulates:   Independently x   w/cane    w/walker    w/wc      Signed By: Lennette Boxer, MD     November 8, 2019

## 2019-11-08 NOTE — TELEPHONE ENCOUNTER
----- Message from Sangita Luo sent at 11/8/2019 12:00 PM EST -----  Regarding: Dr. Anisha Patel first and last name:      Reason for call: Pt stated she would like someone to call her back regarding her last appt.       Callback required yes/no and why: yes      Best contact number(s): [phone]      Details to clarify the request:      Luisito Ordoñez

## 2019-11-09 LAB
ANION GAP SERPL CALC-SCNC: 6 MMOL/L (ref 5–15)
BUN SERPL-MCNC: 17 MG/DL (ref 6–20)
BUN/CREAT SERPL: 23 (ref 12–20)
CALCIUM SERPL-MCNC: 8.8 MG/DL (ref 8.5–10.1)
CHLORIDE SERPL-SCNC: 105 MMOL/L (ref 97–108)
CO2 SERPL-SCNC: 27 MMOL/L (ref 21–32)
COMMENT, HOLDF: NORMAL
CREAT SERPL-MCNC: 0.75 MG/DL (ref 0.55–1.02)
GLUCOSE SERPL-MCNC: 108 MG/DL (ref 65–100)
POTASSIUM SERPL-SCNC: 4.1 MMOL/L (ref 3.5–5.1)
SAMPLES BEING HELD,HOLD: NORMAL
SODIUM SERPL-SCNC: 138 MMOL/L (ref 136–145)

## 2019-11-09 PROCEDURE — 74011250637 HC RX REV CODE- 250/637: Performed by: HOSPITALIST

## 2019-11-09 PROCEDURE — 74011000258 HC RX REV CODE- 258: Performed by: HOSPITALIST

## 2019-11-09 PROCEDURE — 80048 BASIC METABOLIC PNL TOTAL CA: CPT

## 2019-11-09 PROCEDURE — 36415 COLL VENOUS BLD VENIPUNCTURE: CPT

## 2019-11-09 PROCEDURE — 74011250636 HC RX REV CODE- 250/636: Performed by: HOSPITALIST

## 2019-11-09 PROCEDURE — 65270000029 HC RM PRIVATE

## 2019-11-09 PROCEDURE — 87040 BLOOD CULTURE FOR BACTERIA: CPT

## 2019-11-09 RX ORDER — MORPHINE SULFATE 2 MG/ML
2 INJECTION, SOLUTION INTRAMUSCULAR; INTRAVENOUS
Status: DISCONTINUED | OUTPATIENT
Start: 2019-11-09 | End: 2019-11-14 | Stop reason: HOSPADM

## 2019-11-09 RX ADMIN — PIPERACILLIN SODIUM AND TAZOBACTAM SODIUM 3.38 G: 3; .375 INJECTION, POWDER, LYOPHILIZED, FOR SOLUTION INTRAVENOUS at 15:25

## 2019-11-09 RX ADMIN — KETOROLAC TROMETHAMINE 30 MG: 30 INJECTION, SOLUTION INTRAMUSCULAR at 15:25

## 2019-11-09 RX ADMIN — HYDROCODONE BITARTRATE AND ACETAMINOPHEN 1 TABLET: 5; 325 TABLET ORAL at 14:18

## 2019-11-09 RX ADMIN — HYDROCODONE BITARTRATE AND ACETAMINOPHEN 1 TABLET: 5; 325 TABLET ORAL at 00:14

## 2019-11-09 RX ADMIN — PIPERACILLIN SODIUM AND TAZOBACTAM SODIUM 3.38 G: 3; .375 INJECTION, POWDER, LYOPHILIZED, FOR SOLUTION INTRAVENOUS at 23:52

## 2019-11-09 RX ADMIN — Medication 10 ML: at 09:14

## 2019-11-09 RX ADMIN — Medication 10 ML: at 21:48

## 2019-11-09 RX ADMIN — SODIUM CHLORIDE 750 MG: 900 INJECTION, SOLUTION INTRAVENOUS at 04:33

## 2019-11-09 RX ADMIN — ACETAMINOPHEN 650 MG: 325 TABLET, FILM COATED ORAL at 15:25

## 2019-11-09 RX ADMIN — Medication 10 ML: at 14:00

## 2019-11-09 RX ADMIN — SODIUM CHLORIDE 750 MG: 900 INJECTION, SOLUTION INTRAVENOUS at 12:50

## 2019-11-09 RX ADMIN — Medication 10 ML: at 00:20

## 2019-11-09 RX ADMIN — SODIUM CHLORIDE 750 MG: 900 INJECTION, SOLUTION INTRAVENOUS at 21:30

## 2019-11-09 RX ADMIN — KETOROLAC TROMETHAMINE 30 MG: 30 INJECTION, SOLUTION INTRAMUSCULAR at 03:07

## 2019-11-09 RX ADMIN — PIPERACILLIN SODIUM AND TAZOBACTAM SODIUM 3.38 G: 3; .375 INJECTION, POWDER, LYOPHILIZED, FOR SOLUTION INTRAVENOUS at 07:47

## 2019-11-09 RX ADMIN — HYDROCODONE BITARTRATE AND ACETAMINOPHEN 1 TABLET: 5; 325 TABLET ORAL at 18:16

## 2019-11-09 RX ADMIN — KETOROLAC TROMETHAMINE 30 MG: 30 INJECTION, SOLUTION INTRAMUSCULAR at 09:13

## 2019-11-09 RX ADMIN — HYDROCODONE BITARTRATE AND ACETAMINOPHEN 1 TABLET: 5; 325 TABLET ORAL at 04:33

## 2019-11-09 RX ADMIN — PIPERACILLIN SODIUM AND TAZOBACTAM SODIUM 3.38 G: 3; .375 INJECTION, POWDER, LYOPHILIZED, FOR SOLUTION INTRAVENOUS at 00:19

## 2019-11-09 RX ADMIN — ONDANSETRON 4 MG: 2 INJECTION INTRAMUSCULAR; INTRAVENOUS at 18:17

## 2019-11-09 RX ADMIN — ACETAMINOPHEN 650 MG: 325 TABLET, FILM COATED ORAL at 21:45

## 2019-11-09 RX ADMIN — HYDROCODONE BITARTRATE AND ACETAMINOPHEN 1 TABLET: 5; 325 TABLET ORAL at 09:13

## 2019-11-09 RX ADMIN — KETOROLAC TROMETHAMINE 30 MG: 30 INJECTION, SOLUTION INTRAMUSCULAR at 21:45

## 2019-11-09 NOTE — PROGRESS NOTES
Pharmacy Renal Dosing protocol  Day #1 of Zosyn  Indication:  HAP  Current regimen:  4.5gm IV q8h    Recent Labs     19  1519   WBC 22.4*   CREA 0.64   BUN 16     Est CrCl: 55 ml/min; UO: n/a ml/kg/hr  Temp (24hrs), Av.4 °F (36.9 °C), Min:98.3 °F (36.8 °C), Max:98.6 °F (37 °C)    Plan: Change to 3.375g IV q8h extended infusion for HAP and CrCl > 20 ml/min

## 2019-11-09 NOTE — PROGRESS NOTES
Bedside and Verbal shift change report given to Doug Rosa RN (oncoming nurse) by Ochoa Corrigan RN (offgoing nurse). Report included the following information SBAR, Kardex, Intake/Output, MAR and Recent Results.

## 2019-11-09 NOTE — PROGRESS NOTES
Pharmacist Note - Vancomycin Dosing    Consult provided for this 30 y.o. female for indication of lung abscess. Antibiotic regimen(s): Vanc + Zosyn    Recent Labs     19  1519   WBC 22.4*   CREA 0.64   BUN 16     Frequency of BMP: daily  Height: 158 cm  Weight: 55 kg  Est CrCl: 96 ml/min  Temp (24hrs), Av.4 °F (36.9 °C), Min:98.3 °F (36.8 °C), Max:98.6 °F (37 °C)    Goal trough = 15 - 20 mcg/mL    Therapy will be initiated with a loading dose of 1250 mg IV x 1 to be followed by a maintenance dose of 750 mg IV every 8 hours. Pharmacy to follow patient daily and order levels / make dose adjustments as appropriate.

## 2019-11-09 NOTE — PROGRESS NOTES
Problem: Patient Education: Go to Patient Education Activity  Goal: Patient/Family Education  Outcome: Progressing Towards Goal     Problem: Falls - Risk of  Goal: *Absence of Falls  Description  Document David Torres Fall Risk and appropriate interventions in the flowsheet.   Outcome: Progressing Towards Goal  Note:   Fall Risk Interventions:            Medication Interventions: Evaluate medications/consider consulting pharmacy, Patient to call before getting OOB, Teach patient to arise slowly

## 2019-11-09 NOTE — CONSULTS
Pulmonary, Critical Care, and Sleep Medicine~Consult Note    Name: Tavo Bray MRN: [id number]   : 1995 Hospital: . [address]   Date: 2019 11:22 AM Admission: 2019     IMPRESSION:   · Abnormal CT chest: small right pleural effusion, peripheral right lower lobe 2.3 cm x 1.2 cm focal fluid collection, left lower lobe 1.4 cm x 1.8 cm mass like opacity ? Septic emboli  · Recent admission for presumed EBV  · Blood cultures 10/29/19 w/ GNR  bottles      PLAN:   · Empiric abx  · ID consulted  · Blood cultures, sputum culture if cough becomes productive  · F/u cultures  · Will require repeat CT in 4 - 6 weeks  · Will see as needed tomorrow   · d/w Dr. Carol Sanches, hospitalist team, patient and mother        Daily Progression:    HPI:  31yo female nonsmoker w/out sig pmhx admitted 19 w/ right sided pleuritic cp. Recently admitted to this facility 10/30 - 19 w/ acute illness. Treated for acute EBV. CXR 10/30 clear. BC done while inpt  GNR. Pain began about 2 days ago. Worse w/ deep inspiration. No fevers. Very minimal nonproductive cough. Mild dyspnea. No wheezing. Presented to PCP , noted RUQ abdominal tenderness, referred to ED. CT abdomen w/ small right pleural effusion, 2.3 cm x 2.3 cm x 1.3 cm focal fluid collection. CT chest w/ small right pleural effusion, peripheral right lower lobe 2.3 cm x 1.2 cm focal fluid collection, left lower lobe 1.4 cm x 1.8 cm mass like opacity. Placed on empiric abx. Sitting up in bed. Mother at bedside. Pain some improved, worse w/ deep breathing. Tmax 100.6. WBC 22. Oxygenating well on RA. Nonsmoker w/out hx of asthma. No family hx of lung disease. Cough nonproductive. Denies drug use. PMHX: negative    Surgical HX: none    Social HX: denies drug use, nonsmoker    Family HX: noncontributory    Home medications: birth control      I have reviewed the labs and previous days notes.     A comprehensive review of systems was negative except for that written in the HPI. No past medical history on file. No past surgical history on file. Prior to Admission medications    Medication Sig Start Date End Date Taking? Authorizing Provider   acetaminophen (TYLENOL) 325 mg tablet Take 325 mg by mouth every four (4) hours as needed for Pain. Yes Provider, Historical   norethindrone-ethinyl estradiol (JUNEL FE 1/20, ,) 1 mg-20 mcg (21)/75 mg (7) tab Take 1 Tab by mouth daily. Yes Provider, Historical   ibuprofen (ADVIL) 200 mg tablet Take 200 mg by mouth every six (6) hours as needed for Pain. Yes Provider, Historical     Allergies   Allergen Reactions    Sulfa (Sulfonamide Antibiotics) Hives     19 \"Extreme hives\" per Mother      Social History     Tobacco Use    Smoking status: Never Smoker    Smokeless tobacco: Never Used   Substance Use Topics    Alcohol use: Never     Frequency: Never      Family History   Problem Relation Age of Onset    Stroke Maternal Grandfather     Cancer Neg Hx     Diabetes Neg Hx     Heart Disease Neg Hx        OBJECTIVE:     Vital Signs:       Visit Vitals  /75   Pulse 78   Temp 98.3 °F (36.8 °C)   Resp 16   Wt 56 kg (123 lb 6.4 oz)   LMP 10/14/2019 (Approximate)   SpO2 97%   Breastfeeding?  No   BMI 22.57 kg/m²      Temp (24hrs), Av.6 °F (36.4 °C), Min:89.5 °F (31.9 °C), Max:100.6 °F (38.1 °C)     Intake/Output:     Last shift: 701 - 1900  In: -   Out: 150 [Urine:150]    Last 3 shifts: 1901 - 700  In: 1100 [I.V.:1100]  Out: 350 [Urine:350]          Intake/Output Summary (Last 24 hours) at 2019 1122  Last data filed at 2019 0835  Gross per 24 hour   Intake 1100 ml   Output 500 ml   Net 600 ml         Imaging:  I have personally reviewed these radiographic films and reports:     I have personally reviewed PFTs:          Ventilation:   Mode Rate Tidal Volume Pressure Suppport FiO2/LPM PEEP Other               Peak airway pressure:      Minute ventilation:      Trilogy:        Physical Exam:                                        Exam Findings Other   General: No resp distress noted, appears stated age    [de-identified]:  No ulcers, JVD not elevated, no cervical LAD    Chest: No pectus deformity, normal chest rise b/l    HEART:  RRR, no murmurs/rubs/gallops    Lungs:  CTA b/l, no rhonchi/crackles/wheeze, diminished BS right base    ABD: Soft/NT, non rigid mildly distended    EXT: No cyanosis/clubbing/edema, normal peripheral pulses    Skin: No rashes or ulcers, no mottling    Neuro:  A/O x 3        Medications:  Current Facility-Administered Medications   Medication Dose Route Frequency    [START ON 11/10/2019] Vancomycin trough prior to 0400 dose 11/10/19   Other ONCE    piperacillin-tazobactam (ZOSYN) 3.375 g in 0.9% sodium chloride (MBP/ADV) 100 mL  3.375 g IntraVENous Q8H    sodium chloride (NS) flush 5-40 mL  5-40 mL IntraVENous Q8H    sodium chloride (NS) flush 5-40 mL  5-40 mL IntraVENous PRN    naloxone (NARCAN) injection 0.4 mg  0.4 mg IntraVENous PRN    diphenhydrAMINE (BENADRYL) capsule 25 mg  25 mg Oral Q4H PRN    ondansetron (ZOFRAN) injection 4 mg  4 mg IntraVENous Q4H PRN    acetaminophen (TYLENOL) tablet 650 mg  650 mg Oral Q4H PRN    HYDROcodone-acetaminophen (NORCO) 5-325 mg per tablet 1 Tab  1 Tab Oral Q4H PRN    morphine injection 4 mg  4 mg IntraVENous Q4H PRN    ketorolac (TORADOL) injection 30 mg  30 mg IntraVENous Q6H PRN    Vancomycin- pharmacy to dose   Other Rx Dosing/Monitoring    vancomycin (VANCOCIN) 750 mg in 0.9% sodium chloride (MBP/ADV) 250 mL  750 mg IntraVENous Q8H       Labs:  ABG No results for input(s): PHI, PCO2I, PO2I, HCO3I, SO2I, FIO2I in the last 72 hours.      CBC Recent Labs     11/08/19  1519   WBC 22.4*   HGB 10.7*   HCT 33.8*   *   MCV 90.4   MCH 40.8        Metabolic  Panel Recent Labs     11/09/19  0306 11/08/19  1519    138   K 4.1 4.2    102   CO2 27 28   GLU 108* 99   BUN 17 16   CREA 0.75 0.64   CA 8.8 9.8   ALB  --  3.2*   SGOT  --  14*   ALT  --  26        Pertinent Labs                Melody Mederos NP  11/9/2019

## 2019-11-09 NOTE — PROGRESS NOTES
Bedside shift change report given to [facility] (oncoming nurse) by Michelle Johansen RN (offgoing nurse). Report included the following information SBAR, Kardex, Intake/Output, MAR and Recent Results.

## 2019-11-09 NOTE — PROGRESS NOTES
Hospitalist Progress Note          Nithya Abdul NP  Please call  and page for questions. Call physician on-call through the  7pm-7am    Daily Progress Note: 11/9/2019    Primary care provider:None    Date of admission: 11/8/2019  3:14 PM    Admission Summary and Hospital Course:    From H&P 11/8/19:  Alejandra Mendoza is a 30 y.o. female who presents with right lower chest pain    Patient is a 49-year-old female recently admitted for mononucleosis from 10/20 to 11/2. At that time patient was having rt cervicl/submad adenopahty, severe sore throat,  and hepatosplenomegaly. Since discharge, her above symptoms are much improved, no longer have sore throat. She developed severe right lower chest and right upper quadrant yesterday,  abdominal pain. described as sharp, stabbing pain that radiates to the right shoulder, severe, 10/10.  It is worse when she coughs or breathes.  It is also worse with moving.  Rest makes it better. Jose Juan Garza has not had nausea, vomiting, diarrhea.  No skin color change.  No change in eye color.  No fevers or chills.   She saw her PCP today who was concerned about appendicitis so he sent her to ER,  although she denies any right lower quadrant abdominal pain.  No history of surgery to the abdomen.     was having trouble swallowing food last week, now resolved. \"    10/30/2019   -Throat Culture, RSV by PCR, Strep AG Screen, Respiratory Panel, Flu A&B, Mononucleosis screen, Hep panel, HIV and Bordetella by PCR, CXR - all negative  -EBV AB Panel - positive  -[id number] Activity - low    -US Abd LTD: IMPRESSION: 1. Hepatomegaly and hepatic steatosis. 2. Splenomegaly. 3. Small amount of gallbladder sludge with an adherent stone or sludge ball measuring 7 mm. No biliary duct dilatation.      EKG: Sinus tachycardia with short KS   Nonspecific T wave abnormality   Prolonged QT   No previous ECGs available     10/31/2019  -NM Hepatobiliary w/ intervention: IMPRESSION:  Normal gallbladder hepatobiliary imaging study. 11/8/2019   -Re-admitted now for right lateral CP pain    -CT Abd/Pelv w/ cont: IMPRESSION: 2.3 cm x 2.3 cm x 1.2 cm peripheral inferior right lower lobe fluid  collection likely to represent a pulmonary abscess. Trace right pleural fluid. Recommend follow-up to complete resolution. -CT Chest w/o cont: IMPRESSION:   1. 2.3 cm x 1.3 cm peripheral right lower lobe focal fluid collection likely to  represent a pulmonary abscess. 2. small right pleural effusion, slightly increased in size. 3. 1.4 cm x 1.8 cm superior segment left lower lobe opacity. Repeat paired BC: results pending    Echo: results pending    ID consulted      Subjective:   Patient seen with mother at bedside. Pulmonary NP arrived during visit. Patient alert and oriented x4. Reports continued right lateral chest pain, especially with movement of R arm. Much improved with toradol and norco. Pt shallow breathing d/t pain. Denies cough, midsternal CP, dizziness, abd pain, n/v. She states she does get SOB \"a bit\" with walking. Discussed importance of pain control and using IS/ambulating to prevent further lung complications, development of pneumonias. Paired BC done on 10/29 showed 1/2 bottles w/ anaerobic gram neg rods ESBL negative. Assessment/Plan:     1.  Lung abscess:   -Small areas on R and L on CT; seen by pulm 11/9-small abscess vs PNA vs septic emboli  -Will go ahead and order echo  -SCDs and frequently ambulation for DVT proph  -Sputum culture pending-so far unable to produce  -Repeat paired BC  -UA unremarkable but not entirely normal - will go ahead and culture urine  -Encourage PO fluids, IS q1h while awake, TCDB  -Pain control to prevent hypoventilation and promote comfort: current regimen - Norco 5/325 1 q4h prn, Toradol 30mg IV q6h PRN, morphine 2mg IV q4h PRN for breakthrough; Pain goal less than 5  -WBC now 22 (up from 18 on admission); were 10 on d/c 11/3  -Continue vanc/zosyn for now  -Consult ID  -Appreciate pulm input     2. Hepatomegaly and Splenomegaly d/t mononucleosis:  -hepatomegaly resolved on CT   -splenomegaly improved  -WBC now 22 (up from 18 on admission); were 10 on d/c 11/3    See orders for other plans. Diet: Regular  VTE prophylaxis:  SCD, frequent ambulation  Code status: FULL  Discussed plan of care with: Patient/Family and Nurse. Pre-admission: lived at home. Independent  Discharge planning: Home once medically stable    Needed for Discharge: Pending    Emergency Contact(s):  Extended Emergency Contact Information  Primary Emergency Contact: Shonna Kenney  Home Phone: [phone]  Mobile Phone: [phone]  Relation: Mother          Review of Systems:     Review of Systems:  Symptom  Y/N  Comments   Symptom  Y/N  Comments    Fever/Chills   N   Chest Pain  Y R LATERAL   Poor Appetite   N   Edema   N    Cough  N   Abdominal Pain   N    Sputum  N   Joint Pain  N    SOB/ZARCO  Y ACTIVITY  Pruritis/Rash  N    Nausea/vomit  N   Tolerating PT/OT  Y    Diarrhea  N   Tolerating Diet  Y    Constipation  N   Other      Could not obtain due to:         Objective:   Physical Exam:     Visit Vitals  /75   Pulse 78   Temp 98.3 °F (36.8 °C)   Resp 16   Wt 56 kg (123 lb 6.4 oz)   LMP 10/14/2019 (Approximate)   SpO2 97%   Breastfeeding? No   BMI 22.57 kg/m²      O2 Device: Room air    Temp (24hrs), Av.6 °F (36.4 °C), Min:89.5 °F (31.9 °C), Max:100.6 °F (38.1 °C)    701 - 1900  In: -   Out: 150 [Urine:150]   1901 - 700  In: 1100 [I.V.:1100]  Out: 350 [Urine:350]      General:  Alert, cooperative, no distress, appears stated age. +R submand lymph nodes enlarged   Lungs:   Clear lung sounds throughout except R mid/low which is diminished   Heart:  Regular rate and rhythm, S1, S2 normal, no murmur, click, rub or gallop. Abdomen:   Soft, non-tender, non-distended.  Bowel sounds normal. +splenomegaly - improved per H&P Extremities: Extremities normal, atraumatic, no cyanosis or edema. Skin: Skin color, texture, turgor normal. No rashes or lesions   Neurologic: CNII-XII intact. Data Review:       Recent Days:  Recent Labs     11/08/19  1519   WBC 22.4*   HGB 10.7*   HCT 33.8*   *     Recent Labs     11/09/19  0306 11/08/19  1519    138   K 4.1 4.2    102   CO2 27 28   * 99   BUN 17 16   CREA 0.75 0.64   CA 8.8 9.8   ALB  --  3.2*   SGOT  --  14*   ALT  --  26     No results for input(s): PH, PCO2, PO2, HCO3, FIO2 in the last 72 hours. 24 Hour Results:  Recent Results (from the past 24 hour(s))   CBC WITH AUTOMATED DIFF    Collection Time: 11/08/19  3:19 PM   Result Value Ref Range    WBC 22.4 (H) 3.6 - 11.0 K/uL    RBC 3.74 (L) 3.80 - 5.20 M/uL    HGB 10.7 (L) 11.5 - 16.0 g/dL    HCT 33.8 (L) 35.0 - 47.0 %    MCV 90.4 80.0 - 99.0 FL    MCH 28.6 26.0 - 34.0 PG    MCHC 31.7 30.0 - 36.5 g/dL    RDW 13.2 11.5 - 14.5 %    PLATELET 511 (H) 067 - 400 K/uL    MPV 9.8 8.9 - 12.9 FL    NRBC 0.0 0  WBC    ABSOLUTE NRBC 0.00 0.00 - 0.01 K/uL    NEUTROPHILS 81 (H) 32 - 75 %    LYMPHOCYTES 10 (L) 12 - 49 %    MONOCYTES 7 5 - 13 %    EOSINOPHILS 1 0 - 7 %    BASOPHILS 0 0 - 1 %    IMMATURE GRANULOCYTES 1 (H) 0.0 - 0.5 %    ABS. NEUTROPHILS 18.2 (H) 1.8 - 8.0 K/UL    ABS. LYMPHOCYTES 2.2 0.8 - 3.5 K/UL    ABS. MONOCYTES 1.6 (H) 0.0 - 1.0 K/UL    ABS. EOSINOPHILS 0.2 0.0 - 0.4 K/UL    ABS. BASOPHILS 0.0 0.0 - 0.1 K/UL    ABS. IMM.  GRANS. 0.2 (H) 0.00 - 0.04 K/UL    DF AUTOMATED      RBC COMMENTS NORMOCYTIC, NORMOCHROMIC      WBC COMMENTS TOXIC GRANULATION     METABOLIC PANEL, COMPREHENSIVE    Collection Time: 11/08/19  3:19 PM   Result Value Ref Range    Sodium 138 136 - 145 mmol/L    Potassium 4.2 3.5 - 5.1 mmol/L    Chloride 102 97 - 108 mmol/L    CO2 28 21 - 32 mmol/L    Anion gap 8 5 - 15 mmol/L    Glucose 99 65 - 100 mg/dL    BUN 16 6 - 20 MG/DL    Creatinine 0.64 0.55 - 1.02 MG/DL    BUN/Creatinine ratio 25 (H) 12 - 20      GFR est AA >60 >60 ml/min/1.73m2    GFR est non-AA >60 >60 ml/min/1.73m2    Calcium 9.8 8.5 - 10.1 MG/DL    Bilirubin, total 0.4 0.2 - 1.0 MG/DL    ALT (SGPT) 26 12 - 78 U/L    AST (SGOT) 14 (L) 15 - 37 U/L    Alk. phosphatase 87 45 - 117 U/L    Protein, total 7.9 6.4 - 8.2 g/dL    Albumin 3.2 (L) 3.5 - 5.0 g/dL    Globulin 4.7 (H) 2.0 - 4.0 g/dL    A-G Ratio 0.7 (L) 1.1 - 2.2     SAMPLES BEING HELD    Collection Time: 11/08/19  3:19 PM   Result Value Ref Range    SAMPLES BEING HELD 1 RED, 1 BABAK     COMMENT        Add-on orders for these samples will be processed based on acceptable specimen integrity and analyte stability, which may vary by analyte. LIPASE    Collection Time: 11/08/19  3:19 PM   Result Value Ref Range    Lipase 182 73 - 393 U/L   URINALYSIS W/ RFLX MICROSCOPIC    Collection Time: 11/08/19  4:00 PM   Result Value Ref Range    Color DARK YELLOW      Appearance CLOUDY (A) CLEAR      Specific gravity 1.018 1.003 - 1.030      pH (UA) 7.5 5.0 - 8.0      Protein NEGATIVE  NEG mg/dL    Glucose NEGATIVE  NEG mg/dL    Ketone NEGATIVE  NEG mg/dL    Bilirubin NEGATIVE  NEG      Blood NEGATIVE  NEG      Urobilinogen 1.0 0.2 - 1.0 EU/dL    Nitrites NEGATIVE  NEG      Leukocyte Esterase NEGATIVE  NEG     URINE CULTURE HOLD SAMPLE    Collection Time: 11/08/19  4:00 PM   Result Value Ref Range    Urine culture hold        URINE ON HOLD IN MICROBIOLOGY DEPT FOR 3 DAYS. IF UNPRESERVED URINE IS SUBMITTED, IT CANNOT BE USED FOR ADDITIONAL TESTING AFTER 24 HRS, RECOLLECTION WILL BE REQUIRED.    HCG URINE, QL. - POC    Collection Time: 11/08/19  4:33 PM   Result Value Ref Range    Pregnancy test,urine (POC) NEGATIVE  NEG     POC LACTIC ACID    Collection Time: 11/08/19  5:36 PM   Result Value Ref Range    Lactic Acid (POC) 1.65 0.40 - 2.75 mmol/L   METABOLIC PANEL, BASIC    Collection Time: 11/09/19  3:06 AM   Result Value Ref Range    Sodium 138 136 - 145 mmol/L    Potassium 4.1 3.5 - 5.1 mmol/L    Chloride 105 97 - 108 mmol/L    CO2 27 21 - 32 mmol/L    Anion gap 6 5 - 15 mmol/L    Glucose 108 (H) 65 - 100 mg/dL    BUN 17 6 - 20 MG/DL    Creatinine 0.75 0.55 - 1.02 MG/DL    BUN/Creatinine ratio 23 (H) 12 - 20      GFR est AA >60 >60 ml/min/1.73m2    GFR est non-AA >60 >60 ml/min/1.73m2    Calcium 8.8 8.5 - 10.1 MG/DL       Problem List:  Problem List as of 11/9/2019 Date Reviewed: 11/8/2019          Codes Class Noted - Resolved    Lung abscess ([facility] ICD-10-CM: J85.2  ICD-9-CM: 513.0  11/8/2019 - Present        SIRS (systemic inflammatory response syndrome) ([facility] ICD-10-CM: R65.10  ICD-9-CM: 995.90  10/30/2019 - Present        Prolonged QT interval ICD-10-CM: R94.31  ICD-9-CM: 794.31  10/30/2019 - Present        GISELLA (acute kidney injury) ([facility] ICD-10-CM: N17.9  ICD-9-CM: 584.9  10/30/2019 - Present        Thrombocytopenia ([facility] ICD-10-CM: D69.6  ICD-9-CM: 287.5  10/30/2019 - Present        Hypokalemia ICD-10-CM: E87.6  ICD-9-CM: 276.8  10/30/2019 - Present        Hypophosphatemia ICD-10-CM: E83.39  ICD-9-CM: 275.3  10/30/2019 - Present        Hyponatremia ICD-10-CM: E87.1  ICD-9-CM: 276.1  10/30/2019 - Present              Medications reviewed  Current Facility-Administered Medications   Medication Dose Route Frequency    [START ON 11/10/2019] Vancomycin trough prior to 0400 dose 11/10/19   Other ONCE    piperacillin-tazobactam (ZOSYN) 3.375 g in 0.9% sodium chloride (MBP/ADV) 100 mL  3.375 g IntraVENous Q8H    sodium chloride (NS) flush 5-40 mL  5-40 mL IntraVENous Q8H    sodium chloride (NS) flush 5-40 mL  5-40 mL IntraVENous PRN    naloxone (NARCAN) injection 0.4 mg  0.4 mg IntraVENous PRN    diphenhydrAMINE (BENADRYL) capsule 25 mg  25 mg Oral Q4H PRN    ondansetron (ZOFRAN) injection 4 mg  4 mg IntraVENous Q4H PRN    acetaminophen (TYLENOL) tablet 650 mg  650 mg Oral Q4H PRN    HYDROcodone-acetaminophen (NORCO) 5-325 mg per tablet 1 Tab  1 Tab Oral Q4H PRN    morphine injection 4 mg  4 mg IntraVENous Q4H PRN    ketorolac (TORADOL) injection 30 mg  30 mg IntraVENous Q6H PRN    Vancomycin- pharmacy to dose   Other Rx Dosing/Monitoring    vancomycin (VANCOCIN) 750 mg in 0.9% sodium chloride (MBP/ADV) 250 mL  750 mg IntraVENous Q8H       Care Plan discussed with: Patient, mother, RN, Filemon Randle NP  Total time spent with patient: 30 minutes.     Omid De Jesus, NP  Hospitalist  Providers can reach me directly at [phone] or [facility]

## 2019-11-09 NOTE — PROGRESS NOTES
Hospitalist Progress Note  Aranza Martin MD  Office: [phone]        Date of Service:  2019  NAME:  Candice Clancy  :  1995  MRN:  [id number]    Pt seen and examined discussed care plan  Please see the more detailed note from the NP. I agree with NP's assessment and plan. Details in NP note    Hospital Problems  Date Reviewed: 2019          Codes Class Noted POA    Lung abscess [facility]) ICD-10-CM: J85.2  ICD-9-CM: 513.0  2019 Unknown                Review of Systems:   Pertinent items are noted in HPI. Vital Signs:    Last 24hrs VS reviewed since prior progress note. Most recent are:  Visit Vitals  /75   Pulse 78   Temp 98.3 °F (36.8 °C)   Resp 16   Wt 56 kg (123 lb 6.4 oz)   SpO2 97%   Breastfeeding? No   BMI 22.57 kg/m²           Physical Examination:                     Resp:  CTA bilaterally. CV:  Regular rhythm, normal rate, no murmurs, gallops, rubs    GI:  Soft, non distended, non tender. normoactive bowel sounds, no hepatosplenomegaly     Musculoskeletal:  No edema,     Neurologic:  Moves all extremities. AAOx3, CN II-XII reviewed         PLAN:     1. Small lung abscess with rt plural effusion  2. Cont abx CTS evaluation for recommendation  3.  May do CT guided drainage monday  ______________________________________________________________________  EXPECTED LENGTH OF STAY: - - -  ACTUAL LENGTH OF STAY:          1                 Aranza Martin MD

## 2019-11-10 ENCOUNTER — APPOINTMENT (OUTPATIENT)
Dept: CT IMAGING | Age: 24
DRG: 871 | End: 2019-11-10
Attending: INTERNAL MEDICINE
Payer: COMMERCIAL

## 2019-11-10 LAB
ANION GAP SERPL CALC-SCNC: 4 MMOL/L (ref 5–15)
ANION GAP SERPL CALC-SCNC: 5 MMOL/L (ref 5–15)
APTT PPP: 31.8 SEC (ref 22.1–32)
BACTERIA SPEC CULT: NORMAL
BASOPHILS # BLD: 0.1 K/UL (ref 0–0.1)
BASOPHILS NFR BLD: 1 % (ref 0–1)
BUN SERPL-MCNC: 13 MG/DL (ref 6–20)
BUN SERPL-MCNC: 13 MG/DL (ref 6–20)
BUN/CREAT SERPL: 19 (ref 12–20)
BUN/CREAT SERPL: 21 (ref 12–20)
CALCIUM SERPL-MCNC: 8.8 MG/DL (ref 8.5–10.1)
CALCIUM SERPL-MCNC: 8.9 MG/DL (ref 8.5–10.1)
CC UR VC: NORMAL
CHLORIDE SERPL-SCNC: 104 MMOL/L (ref 97–108)
CHLORIDE SERPL-SCNC: 106 MMOL/L (ref 97–108)
CO2 SERPL-SCNC: 27 MMOL/L (ref 21–32)
CO2 SERPL-SCNC: 29 MMOL/L (ref 21–32)
COMMENT, HOLDF: NORMAL
CREAT SERPL-MCNC: 0.63 MG/DL (ref 0.55–1.02)
CREAT SERPL-MCNC: 0.7 MG/DL (ref 0.55–1.02)
DIFFERENTIAL METHOD BLD: ABNORMAL
EOSINOPHIL # BLD: 0.2 K/UL (ref 0–0.4)
EOSINOPHIL NFR BLD: 1 % (ref 0–7)
ERYTHROCYTE [DISTWIDTH] IN BLOOD BY AUTOMATED COUNT: 13 % (ref 11.5–14.5)
FACT VIII ACT/NOR PPP: 217 % (ref 80–200)
FIBRINOGEN PPP-MCNC: 636 MG/DL (ref 200–475)
GLUCOSE SERPL-MCNC: 111 MG/DL (ref 65–100)
GLUCOSE SERPL-MCNC: 137 MG/DL (ref 65–100)
HCT VFR BLD AUTO: 30.5 % (ref 35–47)
HGB BLD-MCNC: 9.7 G/DL (ref 11.5–16)
IMM GRANULOCYTES # BLD AUTO: 0.1 K/UL (ref 0–0.04)
IMM GRANULOCYTES NFR BLD AUTO: 1 % (ref 0–0.5)
INR PPP: 1 (ref 0.9–1.1)
LYMPHOCYTES # BLD: 2 K/UL (ref 0.8–3.5)
LYMPHOCYTES NFR BLD: 14 % (ref 12–49)
MCH RBC QN AUTO: 28.4 PG (ref 26–34)
MCHC RBC AUTO-ENTMCNC: 31.8 G/DL (ref 30–36.5)
MCV RBC AUTO: 89.2 FL (ref 80–99)
MONOCYTES # BLD: 1.1 K/UL (ref 0–1)
MONOCYTES NFR BLD: 8 % (ref 5–13)
NEUTS SEG # BLD: 10.7 K/UL (ref 1.8–8)
NEUTS SEG NFR BLD: 75 % (ref 32–75)
NRBC # BLD: 0 K/UL (ref 0–0.01)
NRBC BLD-RTO: 0 PER 100 WBC
PLATELET # BLD AUTO: 531 K/UL (ref 150–400)
PMV BLD AUTO: 9 FL (ref 8.9–12.9)
POTASSIUM SERPL-SCNC: 3.2 MMOL/L (ref 3.5–5.1)
POTASSIUM SERPL-SCNC: 3.6 MMOL/L (ref 3.5–5.1)
PROTHROMBIN TIME: 10.6 SEC (ref 9–11.1)
RBC # BLD AUTO: 3.42 M/UL (ref 3.8–5.2)
SAMPLES BEING HELD,HOLD: NORMAL
SERVICE CMNT-IMP: NORMAL
SODIUM SERPL-SCNC: 137 MMOL/L (ref 136–145)
SODIUM SERPL-SCNC: 138 MMOL/L (ref 136–145)
THERAPEUTIC RANGE,PTTT: NORMAL SECS (ref 58–77)
WBC # BLD AUTO: 14 K/UL (ref 3.6–11)

## 2019-11-10 PROCEDURE — 85730 THROMBOPLASTIN TIME PARTIAL: CPT

## 2019-11-10 PROCEDURE — 85384 FIBRINOGEN ACTIVITY: CPT

## 2019-11-10 PROCEDURE — 85025 COMPLETE CBC W/AUTO DIFF WBC: CPT

## 2019-11-10 PROCEDURE — 36415 COLL VENOUS BLD VENIPUNCTURE: CPT

## 2019-11-10 PROCEDURE — 85303 CLOT INHIBIT PROT C ACTIVITY: CPT

## 2019-11-10 PROCEDURE — 74011250636 HC RX REV CODE- 250/636: Performed by: NURSE PRACTITIONER

## 2019-11-10 PROCEDURE — 81241 F5 GENE: CPT

## 2019-11-10 PROCEDURE — 85240 CLOT FACTOR VIII AHG 1 STAGE: CPT

## 2019-11-10 PROCEDURE — 74011250636 HC RX REV CODE- 250/636: Performed by: INTERNAL MEDICINE

## 2019-11-10 PROCEDURE — 70491 CT SOFT TISSUE NECK W/DYE: CPT

## 2019-11-10 PROCEDURE — 74011000258 HC RX REV CODE- 258: Performed by: RADIOLOGY

## 2019-11-10 PROCEDURE — 74011636320 HC RX REV CODE- 636/320: Performed by: RADIOLOGY

## 2019-11-10 PROCEDURE — 85610 PROTHROMBIN TIME: CPT

## 2019-11-10 PROCEDURE — 85300 ANTITHROMBIN III ACTIVITY: CPT

## 2019-11-10 PROCEDURE — 71260 CT THORAX DX C+: CPT

## 2019-11-10 PROCEDURE — 81240 F2 GENE: CPT

## 2019-11-10 PROCEDURE — 74011250637 HC RX REV CODE- 250/637: Performed by: HOSPITALIST

## 2019-11-10 PROCEDURE — 85305 CLOT INHIBIT PROT S TOTAL: CPT

## 2019-11-10 PROCEDURE — 74011250636 HC RX REV CODE- 250/636: Performed by: HOSPITALIST

## 2019-11-10 PROCEDURE — 80048 BASIC METABOLIC PNL TOTAL CA: CPT

## 2019-11-10 PROCEDURE — 65270000029 HC RM PRIVATE

## 2019-11-10 PROCEDURE — 74011000258 HC RX REV CODE- 258: Performed by: INTERNAL MEDICINE

## 2019-11-10 RX ORDER — SODIUM CHLORIDE 0.9 % (FLUSH) 0.9 %
10 SYRINGE (ML) INJECTION
Status: COMPLETED | OUTPATIENT
Start: 2019-11-10 | End: 2019-11-10

## 2019-11-10 RX ORDER — HEPARIN SODIUM 10000 [USP'U]/100ML
18-36 INJECTION, SOLUTION INTRAVENOUS
Status: DISCONTINUED | OUTPATIENT
Start: 2019-11-10 | End: 2019-11-14

## 2019-11-10 RX ORDER — HEPARIN SODIUM 5000 [USP'U]/ML
80 INJECTION, SOLUTION INTRAVENOUS; SUBCUTANEOUS ONCE
Status: ACTIVE | OUTPATIENT
Start: 2019-11-10 | End: 2019-11-11

## 2019-11-10 RX ADMIN — KETOROLAC TROMETHAMINE 30 MG: 30 INJECTION, SOLUTION INTRAMUSCULAR at 08:15

## 2019-11-10 RX ADMIN — HYDROCODONE BITARTRATE AND ACETAMINOPHEN 1 TABLET: 5; 325 TABLET ORAL at 08:15

## 2019-11-10 RX ADMIN — Medication 10 ML: at 22:00

## 2019-11-10 RX ADMIN — KETOROLAC TROMETHAMINE 30 MG: 30 INJECTION, SOLUTION INTRAMUSCULAR at 23:42

## 2019-11-10 RX ADMIN — PIPERACILLIN, TAZOBACTAM 4.5 G: 4; .5 INJECTION, POWDER, LYOPHILIZED, FOR SOLUTION INTRAVENOUS at 07:35

## 2019-11-10 RX ADMIN — PIPERACILLIN, TAZOBACTAM 4.5 G: 4; .5 INJECTION, POWDER, LYOPHILIZED, FOR SOLUTION INTRAVENOUS at 17:16

## 2019-11-10 RX ADMIN — HYDROCODONE BITARTRATE AND ACETAMINOPHEN 1 TABLET: 5; 325 TABLET ORAL at 12:47

## 2019-11-10 RX ADMIN — KETOROLAC TROMETHAMINE 30 MG: 30 INJECTION, SOLUTION INTRAMUSCULAR at 14:25

## 2019-11-10 RX ADMIN — Medication 10 ML: at 09:37

## 2019-11-10 RX ADMIN — HEPARIN SODIUM 18 UNITS/KG/HR: 10000 INJECTION, SOLUTION INTRAVENOUS at 17:57

## 2019-11-10 RX ADMIN — Medication 10 ML: at 07:35

## 2019-11-10 RX ADMIN — ONDANSETRON 4 MG: 2 INJECTION INTRAMUSCULAR; INTRAVENOUS at 18:19

## 2019-11-10 RX ADMIN — IOPAMIDOL 100 ML: 612 INJECTION, SOLUTION INTRAVENOUS at 09:37

## 2019-11-10 RX ADMIN — SODIUM CHLORIDE 100 ML: 900 INJECTION, SOLUTION INTRAVENOUS at 09:37

## 2019-11-10 RX ADMIN — ACETAMINOPHEN 650 MG: 325 TABLET, FILM COATED ORAL at 20:49

## 2019-11-10 RX ADMIN — HYDROCODONE BITARTRATE AND ACETAMINOPHEN 1 TABLET: 5; 325 TABLET ORAL at 17:16

## 2019-11-10 NOTE — CONSULTS
[address]    Name:  Ra Contreras  MR#:  [id number]  :  1995  ACCOUNT #:  [de-identified]  DATE OF SERVICE:  2019    LOCATION:  The patient is in room 501. ATTENDING:  Flavia Nesbitt MD    THE CONSULT WAS REQUESTED BY:  Emily Glasgow NP    CHIEF COMPLAINT:  Right pleuritic chest pain. REASON FOR CONSULTATION:  Patient with recent mono and nodules in the lungs. The consultation was requested by Valery Tejada NP. The history is obtained by interview of the patient and her parents and review of the medical records. HISTORY OF PRESENT ILLNESS:  This patient is a 68-year-old white female with no known previous medical problems. The patient dates the onset of her current problem to 10/25/2019. That morning, she had a mild sore throat, but felt well enough to go to work. By 11:00 a.m. while at work, she felt achy all over and says that she was very \"emotional\" at work. Her supervisor noted that she did not feel well and advised her to go home. The patient went to [facility].  A rapid test for Streptococcus and a rapid test for influenza were negative. The patient was started on Tamiflu because of a clinical suspicion of influenza. However on 10/26/2019, she felt worse and she went to the [facility] emergency room. At that time, the clinical impression was that the patient likely had acute mononucleosis. By that time, she describes a rather marked swelling behind the angle of the jaw on the right, that was quite tender to palpation. She continued to have a sore throat and the sore throat was becoming bad enough that she had difficulty swallowing liquids. The patient apparently was started on steroids and tramadol. She does recall that the area on the right anterior neck was very tender to palpation. The patient really did not feel any better over the next couple of days.   She went back to the emergency room on 10/28/2019, and was admitted to the hospital.  A number of tests were sent, including an acute Ambrose-Barr virus panel. This returned with positive EBV VCA IgG and negative for EBNA, with negative EBV early antigen and negative EBV VCA IgM antibody. The clinical suspicion was that the patient likely had mononucleosis. She was discharged from the hospital on 11/02/2019. Of note, blood cultures drawn on admission on 10/29/2019 subsequently returned revealing gram-negative angelina in two out of four bottles, and in the gram-negative angelina there was felt to be an anaerobe. It was sent to a reference lab for identification. After discharge from the hospital, the patient initially seemed to feel a bit better. However on 11/06/2019, she developed pleuritic chest pain in the right lower chest.  Finally on 11/07/2019, she still had right chest pain with inspiration, but she felt a little bit better overall and went to work. On 11/08/2019, she went to see her primary care physician for followup. He was concerned by her appearance and sent her to the emergency room for a CT scan of the abdomen and pelvis to rule out appendicitis. The CT of abdomen and pelvis was done and did not reveal evidence of appendicitis, but did reveal some lesions at the base of the lungs, suggestion of a possible lung abscess. The patient was admitted to the hospital and was started on vancomycin and Zosyn. We were asked to see her for further evaluation. At the present time, the patient states that the severe swelling and severe tenderness on the right anterolateral neck has improved. She still has some mild swelling and mild tenderness in this area. Her major complaint is pleuritic chest pain in the right lower chest, that occurs with deep inspiration and with coughing. She has had no other specific new complaints. Her sore throat has almost completely resolved.   The patient does not recall having mononucleosis in the past.    PAST MEDICAL HISTORY: Tobacco, none. Alcohol, none. MEDICATIONS PRIOR TO ADMISSION:  Birth control pills. ALLERGIES:  SULFA - RASH OR HIVES. ILLNESSES:  None known. SURGERIES:  None. 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 FAMILY HISTORY:  Unremarkable. REVIEW OF SYSTEMS:  CONSTITUTIONAL:  She had fever at the onset of this illness, but the fever seems to have gone away. She recalls having some chills before her first admission. HEENT:  No headache or earache and no sore throat at this time. She did have a severe sore throat at the onset of the illness. The swelling and tenderness on the right side of the neck is much better but not resolved. LYMPHATIC:  No history of abnormal adenopathy before this illness. DERMATOLOGIC:  No rashes. RESPIRATORY:  She has had a mild cough and has pleuritic chest pain. No hemoptysis. CARDIOVASCULAR:  No history of heart murmurs. GI:  No nausea, vomiting, abdominal pain, diarrhea. :  No dysuria. MUSCULOSKELETAL:  She felt achy all over initially, but that seems to have resolved. NEUROLOGIC:  No history of seizure or stroke. PHYSICAL EXAMINATION:  GENERAL:  No acute distress. VITAL SIGNS:  Blood pressure 98/62, heart rate 88, respiratory rate 18, T-max 100.6. HEENT:  Sclerae and conjunctivae benign, EOMI, oropharynx is now benign. There is mild swelling and tenderness of the right anterolateral neck, and I believe the right internal jugular vein is thrombosed. NECK:  Supple. NODES:  No abnormal adenopathy. SKIN:  No rashes. LUNGS:  Decreased breath sounds at the bases. CARDIOVASCULAR:  Regular rate and rhythm with no murmur. No pericardial rub. ABDOMEN:  Soft, mildly tender in the epigastrium and medial right upper quadrant. No discrete masses are felt. BACK:  No CVA tenderness. PELVIC:  Exam not done. EXTREMITIES:  No edema and no calf tenderness. MUSCULOSKELETAL:  Muscles nontender and joints benign.   NEUROLOGIC: Alert and oriented. LABORATORY DATA:  The CBC reveals a hemoglobin of 10.7, white count 22,400, and platelets 693,277. Chemistry data reveals a BUN of 17, creatinine 0.75. Liver function tests were essentially normal.  The EBV panel sent on 10/30/2019 revealed positive EBV VCA IgG, negative, negative EBV VCA IgM. CMV IgG antibody was negative. MICROBIOLOGY DATA:  Blood cultures on 10/29/2019 revealed gram-negative rods in two out of four bottles, and this organism appears to be an anaerobic gram-negative angelina. 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 I suspect the organism will be Fusobacterium. The lung abscesses will likely respond and resolve with the antibiotic therapy and should not need to be biopsied. She does have a very small right pleural effusion. If this would increase in size, she will need a thoracentesis to rule out an empyema. Her Ambrose-Barr virus serologies last admission were consistent with remote infection but not recent infection. I anticipate at least 4 weeks of antibiotics. PLAN:  1. Continue Zosyn. I will increase the dose to 4.5 g IV q.8 h. She will need at least 4 weeks of antibiotics. 2.  Discontinue vancomycin. 3.  CT scan of the neck with IV contrast to evaluate for right internal jugular thrombosis. Thank you much for allowing us see this patient with you.       Arin Lopez MD      [id number]  D:  11/10/2019 1:25  T:  11/10/2019 4:56  JOB #:  [id number]  CC:  MD Ligia Morin MD Reid Berg, MD Ephriam Gall, NP Jodie Coddington, MD

## 2019-11-10 NOTE — PROGRESS NOTES
Bedside report given to Shabnam W Tierra Avendano using SBAR. Reviewed MAR,MD orders, Kardex, I&O and plan of care.

## 2019-11-10 NOTE — PROGRESS NOTES
Bedside shift change report given to [facility] (oncoming nurse) by Kelton Gabriel RN (offgoing nurse). Report included the following information SBAR, Kardex, Intake/Output, MAR and Recent Results.

## 2019-11-10 NOTE — PROGRESS NOTES
Hospitalist Progress Note  Aranza Martin MD  Office: [phone]        Date of Service:  11/10/2019  NAME:  Candice Clancy  :  1995  MRN:  [id number]    Pt seen and examined discussed care plan  Please see the more detailed note from the NP. I agree with NP's assessment and plan. Details in NP note    Hospital Problems  Date Reviewed: 2019          Codes Class Noted POA    Lung abscess [facility]) ICD-10-CM: J85.2  ICD-9-CM: 513.0  2019 Unknown                Review of Systems:   Pertinent items are noted in HPI. Vital Signs:    Last 24hrs VS reviewed since prior progress note. Most recent are:  Visit Vitals  /77   Pulse 88   Temp 98 °F (36.7 °C)   Resp 16   Wt 56 kg (123 lb 6.4 oz)   SpO2 95%   Breastfeeding? No   BMI 22.57 kg/m²     Ct Neck Soft Tissue W Cont    Result Date: 11/10/2019  IMPRESSION: 1. Partial occlusion right internal jugular vein, from just below the skull base to the lower neck as above. Does not  extend intracranially or into the chest. 2. Mildly enlarged cervical lymph nodes. No evidence of neck abscess. 3. No significant change in 2.2 x 1.2 cm rim-enhancing fluid collection lateral right lung base most consistent with pulmonary abscess. Associated pleural effusion. 4. 1.5 x 1.5 cm nodular airspace disease left lower lobe is unchanged. The findings were called to nurse Alia Nieves and messaged via [employer] to Dr. Leandro Humphrey on 11/10/2019 at 10:20 AM by myself. [phone]    Ct Chest Wo Cont    Result Date: 2019  IMPRESSION: 1. 2.3 cm x 1.3 cm peripheral right lower lobe focal fluid collection likely to represent a pulmonary abscess. 2. small right pleural effusion, slightly increased in size. 3. 1.4 cm x 1.8 cm superior segment left lower lobe opacity. Ct Chest W Cont    Result Date: 11/10/2019  IMPRESSION: 1.  Partial occlusion right internal jugular vein, from just below the skull base to the lower neck as above. Does not  extend intracranially or into the chest. 2. Mildly enlarged cervical lymph nodes. No evidence of neck abscess. 3. No significant change in 2.2 x 1.2 cm rim-enhancing fluid collection lateral right lung base most consistent with pulmonary abscess. Associated pleural effusion. 4. 1.5 x 1.5 cm nodular airspace disease left lower lobe is unchanged. The findings were called to nurse Chepe Valdes and messaged via [employer] to Dr. Dudley Sanford on 11/10/2019 at 10:20 AM by myself. [address] [facility] [place]    Result Date: 11/8/2019  IMPRESSION: 2.3 cm x 2.3 cm x 1.2 cm peripheral inferior right lower lobe fluid collection likely to represent a pulmonary abscess. Trace right pleural fluid. Recommend follow-up to complete resolution. Physical Examination:                     Resp:  CTA bilaterally. CV:  Regular rhythm, normal rate, no murmurs, gallops, rubs    GI:  Soft, non distended, non tender. normoactive bowel sounds, no hepatosplenomegaly     Musculoskeletal:  No edema,     Neurologic:  Moves all extremities. AAOx3, CN II-XII reviewed         PLAN:     1. Small lung abscess with rt plural effusion seen by ID will need 6 weeks of IV abx per Dr. Dudley Sanford  2. CTA - showing partial occlusion of IJ will start on heparin drip vascular surgery consulted   3.  Pt will need hematology consult and hyper coagulable w/u   D/w NP     ______________________________________________________________________  EXPECTED LENGTH OF STAY: - - -  ACTUAL LENGTH OF STAY:          2                 Arden Ambrose MD

## 2019-11-10 NOTE — PROGRESS NOTES
Hospitalist Progress Note          Valery Tejada NP  Please call  and page for questions. Call physician on-call through the  7pm-7am    Daily Progress Note: 11/10/2019    Primary care provider:None    Date of admission: 11/8/2019  3:14 PM    Admission Summary and Hospital Course:    From H&P 11/8/19:  Eder Hernandez is a 30 y.o. female who presents with right lower chest pain    Patient is a 47-year-old female recently admitted for mononucleosis from 10/20 to 11/2. At that time patient was having rt cervicl/submad adenopahty, severe sore throat,  and hepatosplenomegaly. Since discharge, her above symptoms are much improved, no longer have sore throat. She developed severe right lower chest and right upper quadrant yesterday,  abdominal pain. described as sharp, stabbing pain that radiates to the right shoulder, severe, 10/10.  It is worse when she coughs or breathes.  It is also worse with moving.  Rest makes it better. Steven Magaña has not had nausea, vomiting, diarrhea.  No skin color change.  No change in eye color.  No fevers or chills.   She saw her PCP today who was concerned about appendicitis so he sent her to ER,  although she denies any right lower quadrant abdominal pain.  No history of surgery to the abdomen.     was having trouble swallowing food last week, now resolved. \"    10/30/2019   -Throat Culture, RSV by PCR, Strep AG Screen, Respiratory Panel, Flu A&B, Mononucleosis screen, Hep panel, HIV and Bordetella by PCR, CXR - all negative  -EBV AB Panel - positive  -[id number] Activity - low    -US Abd LTD: IMPRESSION: 1. Hepatomegaly and hepatic steatosis. 2. Splenomegaly. 3. Small amount of gallbladder sludge with an adherent stone or sludge ball measuring 7 mm. No biliary duct dilatation.      EKG: Sinus tachycardia with short IN   Nonspecific T wave abnormality   Prolonged QT   No previous ECGs available     10/31/2019  -NM Hepatobiliary w/ intervention: IMPRESSION:  Normal gallbladder hepatobiliary imaging study. 11/8/2019   -Re-admitted now for right lateral CP pain    -CT Abd/Pelv w/ cont: IMPRESSION: 2.3 cm x 2.3 cm x 1.2 cm peripheral inferior right lower lobe fluid  collection likely to represent a pulmonary abscess. Trace right pleural fluid. Recommend follow-up to complete resolution. -CT Chest w/o cont: IMPRESSION:   1. 2.3 cm x 1.3 cm peripheral right lower lobe focal fluid collection likely to  represent a pulmonary abscess. 2. small right pleural effusion, slightly increased in size. 3. 1.4 cm x 1.8 cm superior segment left lower lobe opacity. 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 I suspect the organism will be Fusobacterium. The lung abscesses will likely respond and resolve with the antibiotic therapy and should not need to be biopsied. She does have a very small right pleural effusion. If this would increase in size, she will need a thoracentesis to rule out an empyema. Her Ambrose-Barr virus serologies last admission were consistent with remote infection but not recent infection. I anticipate at least 4 weeks of antibiotics.  PLAN:  1. Continue Zosyn. I will increase the dose to 4.5 g IV q.8 h. She will need at least 4 weeks of antibiotics. 2.  Discontinue vancomycin. 3.  CT scan of the neck with IV contrast to evaluate for right internal jugular thrombosis. \"     CT Chest and Neck Soft Tissue w/ Contrast 11/10/2019:  \"IMPRESSION:   1. Partial occlusion right internal jugular vein, from just below the skull base  to the lower neck as above.  Does not  extend intracranially or into the chest.  2. Mildly enlarged cervical lymph nodes. No evidence of neck abscess. 3. No significant change in 2.2 x 1.2 cm rim-enhancing fluid collection lateral  right lung base most consistent with pulmonary abscess. Associated pleural  effusion. 4. 1.5 x 1.5 cm nodular airspace disease left lower lobe is unchanged.   The findings were called to nurse Robin Dietrich and messaged via [employer] to Dr. Catarina French on 11/10/2019 at 10:20 AM by myself. \"    11/10/2019-Consult for vasc surg; hyper coag studies sent (consult hem/onc if abnormal); Started Heparin gtt; RUE venous duplex ordered    Subjective:   Patient seen today with father at bedside and in conjunction with Dr. Winfred Sicard. She reports that she feels much better today. She does have a cough that she describes as a \"tickle\" but reports it is intermittent. She denies dizziness/vision changes, SOB, CP, HA, GI/ disturbance. R lateral pleuritic pain well managed on current regimen. Discussed with patient results of CT that show partial occlusion of RIJ vein. Discussed also with Dr. Catarina French on telephone. Discussed POC including hypercoag studies, vasc consult, duplex study and heparin gtt. All questions answered.  Updated patient's mother via telephone per patient request.     Assessment/Plan:     Lung abscess:   -Small areas on R and L on CT; seen by pulm 11/9-small abscess vs PNA vs septic emboli  -Will go ahead and order echo-pending  -+clot RIJ-Heparin gtt started 11/10  -Sputum culture pending-so far unable to produce  -[facility] 11/9 - NGTD  -UA unremarkable, culture shows mixed urogenital mushtaq  -Encourage PO fluids, IS q1h while awake, TCDB  -Pain control to prevent hypoventilation and promote comfort: current regimen - Norco 5/325 1 q4h prn, Toradol 30mg IV q6h PRN, morphine 2mg IV q4h PRN for breakthrough; Pain goal less than 5  -WBC much improved today at 14  -Vanc stopped by ID 11/9  -Continue zosyn  -Appreciate ID   -Appreciate pulm Partial Occlusion of RIJ on CT 11/10:  -Hyper-coag panel sent - consult hem/onc if abnormal  -Consult vascular (Dr. Queenie Rivera spoke with Dr. Jason Shin)  -RUE venous Duplex per Dr. Jason Shin  -Will go ahead and start heparin gtt (Dr. Jason Shin agrees)    Hepatomegaly and Splenomegaly d/t mononucleosis:  -hepatomegaly resolved on CT   -splenomegaly improved, asymptomatic  -Monitor for bleeding/bruising now on heparin gtt      See orders for other plans. Diet: Regular  VTE prophylaxis:  Heparin gtt  Code status: FULL  Discussed plan of care with: Patient/Family and Nurse. Pre-admission: lived at home. Independent  Discharge planning: Home once medically stable    Needed for Discharge: Pending    Emergency Contact(s):  Extended Emergency Contact Information  Primary Emergency Contact: Shonna Kenney  Home Phone: [phone]  Mobile Phone: [phone]  Relation: Mother          Review of Systems:     Review of Systems:  Symptom  Y/N  Comments   Symptom  Y/N  Comments    Fever/Chills   N   Chest Pain  Y R LATERAL   Poor Appetite   N   Edema   N    Cough  N   Abdominal Pain   N    Sputum  N   Joint Pain  N    SOB/ZARCO  Y ACTIVITY  Pruritis/Rash  N    Nausea/vomit  N   Tolerating PT/OT  Y    Diarrhea  N   Tolerating Diet  Y    Constipation  N   Other      Could not obtain due to:         Objective:   Physical Exam:     Visit Vitals  /77   Pulse 88   Temp 98 °F (36.7 °C)   Resp 16   Wt 56 kg (123 lb 6.4 oz)   LMP 10/14/2019 (Approximate)   SpO2 95%   Breastfeeding?  No   BMI 22.57 kg/m²      O2 Device: Room air    Temp (24hrs), Av.3 °F (36.8 °C), Min:98 °F (36.7 °C), Max:98.7 °F (37.1 °C)    11/10 0701 - 11/10 1900  In: 350 [P.O.:350]  Out: 350 [Urine:350]   1901 - 11/10 0700  In: 1060 [P.O.:60; I.V.:1000]  Out: 750 [Urine:750]      General:  Alert, cooperative, no distress, appears stated age. +R submand lymph nodes enlarged   Lungs:   Clear lung sounds throughout except R mid/low which is diminished   Heart: Regular rate and rhythm, S1, S2 normal, no murmur, click, rub or gallop. Abdomen:   Soft, non-tender, non-distended. Bowel sounds normal. +splenomegaly - improved per H&P   Extremities: Extremities normal, atraumatic, no cyanosis or edema. Skin: Skin color, texture, turgor normal. No rashes or lesions   Neurologic: CNII-XII intact. Data Review:       Recent Days:  Recent Labs     11/08/19  1519   WBC 22.4*   HGB 10.7*   HCT 33.8*   *     Recent Labs     11/10/19  0413 11/09/19  0306 11/08/19  1519    138 138   K 3.6 4.1 4.2    105 102   CO2 27 27 28   * 108* 99   BUN 13 17 16   CREA 0.63 0.75 0.64   CA 8.9 8.8 9.8   ALB  --   --  3.2*   SGOT  --   --  14*   ALT  --   --  26     No results for input(s): PH, PCO2, PO2, HCO3, FIO2 in the last 72 hours.     24 Hour Results:  Recent Results (from the past 24 hour(s))   METABOLIC PANEL, BASIC    Collection Time: 11/10/19  4:13 AM   Result Value Ref Range    Sodium 137 136 - 145 mmol/L    Potassium 3.6 3.5 - 5.1 mmol/L    Chloride 106 97 - 108 mmol/L    CO2 27 21 - 32 mmol/L    Anion gap 4 (L) 5 - 15 mmol/L    Glucose 111 (H) 65 - 100 mg/dL    BUN 13 6 - 20 MG/DL    Creatinine 0.63 0.55 - 1.02 MG/DL    BUN/Creatinine ratio 21 (H) 12 - 20      GFR est AA >60 >60 ml/min/1.73m2    GFR est non-AA >60 >60 ml/min/1.73m2    Calcium 8.9 8.5 - 10.1 MG/DL       Problem List:  Problem List as of 11/10/2019 Date Reviewed: 11/8/2019          Codes Class Noted - Resolved    Lung abscess ([facility] ICD-10-CM: J85.2  ICD-9-CM: 513.0  11/8/2019 - Present        SIRS (systemic inflammatory response syndrome) ([facility] ICD-10-CM: R65.10  ICD-9-CM: 995.90  10/30/2019 - Present        Prolonged QT interval ICD-10-CM: R94.31  ICD-9-CM: 794.31  10/30/2019 - Present        GISELLA (acute kidney injury) ([facility] ICD-10-CM: N17.9  ICD-9-CM: 584.9  10/30/2019 - Present        Thrombocytopenia ([facility] ICD-10-CM: D69.6  ICD-9-CM: 287.5  10/30/2019 - Present        Hypokalemia ICD-10-CM: E87.6  ICD-9-CM: 276.8  10/30/2019 - Present        Hypophosphatemia ICD-10-CM: E83.39  ICD-9-CM: 275.3  10/30/2019 - Present        Hyponatremia ICD-10-CM: E87.1  ICD-9-CM: 276.1  10/30/2019 - Present              Medications reviewed  Current Facility-Administered Medications   Medication Dose Route Frequency    piperacillin-tazobactam (ZOSYN) 4.5 g in 0.9% sodium chloride (MBP/ADV) 100 mL  4.5 g IntraVENous Q8H    morphine injection 2 mg  2 mg IntraVENous Q4H PRN    sodium chloride (NS) flush 5-40 mL  5-40 mL IntraVENous Q8H    sodium chloride (NS) flush 5-40 mL  5-40 mL IntraVENous PRN    naloxone (NARCAN) injection 0.4 mg  0.4 mg IntraVENous PRN    diphenhydrAMINE (BENADRYL) capsule 25 mg  25 mg Oral Q4H PRN    ondansetron (ZOFRAN) injection 4 mg  4 mg IntraVENous Q4H PRN    acetaminophen (TYLENOL) tablet 650 mg  650 mg Oral Q4H PRN    HYDROcodone-acetaminophen (NORCO) 5-325 mg per tablet 1 Tab  1 Tab Oral Q4H PRN    ketorolac (TORADOL) injection 30 mg  30 mg IntraVENous Q6H PRN       Care Plan discussed with: Patient, mother, RN, Aniyah Dougherty NP  Total time spent with patient: 30 minutes.     Yolis Fowler NP  Hospitalist  Providers can reach me directly at [phone] or [facility]

## 2019-11-10 NOTE — PROGRESS NOTES
Infectious Diseases Consultation     Please see dictated note     Impression      1. Lemierre's Syndrome -- Septic thrombophlebitis of the right internal jugular vein usually caused by Fusobacterium necrophorum and complicated by septic pulmonary emboli resulting in bilateral evolving lung abscesses. I note that the blood cultures on 10/29 reveal an anaerobic GNR in 2 of 4 bottles that has not yet been identified. I suspect this will be Fusobacterium. The lung abscesses will likely respond to antibiotics and should not need biopsy. She has a very small right pleural effusion. If this increases in size, she will need a thoracentesis to R/O empyema. Her EBV serologies last admission suggest remote EBV infection, not recent infection. Recommend:      1. Continue Zosyn -- she will likely need at least 4 weeks of antibiotics    2. D/C Vancomycin    3.  CT neck with IV contrast to evaluate the right IJ vein for thrombosis      Discussed at length with the patient and her parents    Thanks,   Nataliia Ramirez., MD  11/9/2019  9:20 PM

## 2019-11-11 ENCOUNTER — APPOINTMENT (OUTPATIENT)
Dept: VASCULAR SURGERY | Age: 24
DRG: 871 | End: 2019-11-11
Attending: NURSE PRACTITIONER
Payer: COMMERCIAL

## 2019-11-11 LAB
ANION GAP SERPL CALC-SCNC: 4 MMOL/L (ref 5–15)
APTT PPP: 37.5 SEC (ref 22.1–32)
APTT PPP: 44 SEC (ref 22.1–32)
APTT PPP: 44.4 SEC (ref 22.1–32)
BASOPHILS # BLD: 0.1 K/UL (ref 0–0.1)
BASOPHILS # BLD: 0.1 K/UL (ref 0–0.1)
BASOPHILS NFR BLD: 1 % (ref 0–1)
BASOPHILS NFR BLD: 1 % (ref 0–1)
BUN SERPL-MCNC: 14 MG/DL (ref 6–20)
BUN/CREAT SERPL: 22 (ref 12–20)
CALCIUM SERPL-MCNC: 8.9 MG/DL (ref 8.5–10.1)
CHLORIDE SERPL-SCNC: 105 MMOL/L (ref 97–108)
CO2 SERPL-SCNC: 29 MMOL/L (ref 21–32)
CREAT SERPL-MCNC: 0.64 MG/DL (ref 0.55–1.02)
DIFFERENTIAL METHOD BLD: ABNORMAL
DIFFERENTIAL METHOD BLD: ABNORMAL
EOSINOPHIL # BLD: 0.1 K/UL (ref 0–0.4)
EOSINOPHIL # BLD: 0.2 K/UL (ref 0–0.4)
EOSINOPHIL NFR BLD: 1 % (ref 0–7)
EOSINOPHIL NFR BLD: 2 % (ref 0–7)
ERYTHROCYTE [DISTWIDTH] IN BLOOD BY AUTOMATED COUNT: 12.7 % (ref 11.5–14.5)
ERYTHROCYTE [DISTWIDTH] IN BLOOD BY AUTOMATED COUNT: 13 % (ref 11.5–14.5)
GLUCOSE SERPL-MCNC: 127 MG/DL (ref 65–100)
HCT VFR BLD AUTO: 27.6 % (ref 35–47)
HCT VFR BLD AUTO: 30.1 % (ref 35–47)
HGB BLD-MCNC: 8.9 G/DL (ref 11.5–16)
HGB BLD-MCNC: 9.4 G/DL (ref 11.5–16)
IMM GRANULOCYTES # BLD AUTO: 0 K/UL (ref 0–0.04)
IMM GRANULOCYTES # BLD AUTO: 0.1 K/UL (ref 0–0.04)
IMM GRANULOCYTES NFR BLD AUTO: 0 % (ref 0–0.5)
IMM GRANULOCYTES NFR BLD AUTO: 1 % (ref 0–0.5)
LYMPHOCYTES # BLD: 2.1 K/UL (ref 0.8–3.5)
LYMPHOCYTES # BLD: 3 K/UL (ref 0.8–3.5)
LYMPHOCYTES NFR BLD: 20 % (ref 12–49)
LYMPHOCYTES NFR BLD: 28 % (ref 12–49)
MCH RBC QN AUTO: 28.2 PG (ref 26–34)
MCH RBC QN AUTO: 28.7 PG (ref 26–34)
MCHC RBC AUTO-ENTMCNC: 31.2 G/DL (ref 30–36.5)
MCHC RBC AUTO-ENTMCNC: 32.2 G/DL (ref 30–36.5)
MCV RBC AUTO: 89 FL (ref 80–99)
MCV RBC AUTO: 90.4 FL (ref 80–99)
MONOCYTES # BLD: 0.7 K/UL (ref 0–1)
MONOCYTES # BLD: 0.8 K/UL (ref 0–1)
MONOCYTES NFR BLD: 6 % (ref 5–13)
MONOCYTES NFR BLD: 8 % (ref 5–13)
NEUTS SEG # BLD: 6.6 K/UL (ref 1.8–8)
NEUTS SEG # BLD: 7.6 K/UL (ref 1.8–8)
NEUTS SEG NFR BLD: 61 % (ref 32–75)
NEUTS SEG NFR BLD: 71 % (ref 32–75)
NRBC # BLD: 0 K/UL (ref 0–0.01)
NRBC # BLD: 0 K/UL (ref 0–0.01)
NRBC BLD-RTO: 0 PER 100 WBC
NRBC BLD-RTO: 0 PER 100 WBC
PLATELET # BLD AUTO: 530 K/UL (ref 150–400)
PLATELET # BLD AUTO: 613 K/UL (ref 150–400)
PMV BLD AUTO: 8.7 FL (ref 8.9–12.9)
PMV BLD AUTO: 8.9 FL (ref 8.9–12.9)
POTASSIUM SERPL-SCNC: 3.5 MMOL/L (ref 3.5–5.1)
RBC # BLD AUTO: 3.1 M/UL (ref 3.8–5.2)
RBC # BLD AUTO: 3.33 M/UL (ref 3.8–5.2)
SODIUM SERPL-SCNC: 138 MMOL/L (ref 136–145)
THERAPEUTIC RANGE,PTTT: ABNORMAL SECS (ref 58–77)
WBC # BLD AUTO: 10.7 K/UL (ref 3.6–11)
WBC # BLD AUTO: 10.7 K/UL (ref 3.6–11)

## 2019-11-11 PROCEDURE — 74011250637 HC RX REV CODE- 250/637: Performed by: NURSE PRACTITIONER

## 2019-11-11 PROCEDURE — 74011250636 HC RX REV CODE- 250/636: Performed by: HOSPITALIST

## 2019-11-11 PROCEDURE — 74011250637 HC RX REV CODE- 250/637: Performed by: HOSPITALIST

## 2019-11-11 PROCEDURE — 74011250636 HC RX REV CODE- 250/636: Performed by: NURSE PRACTITIONER

## 2019-11-11 PROCEDURE — 87040 BLOOD CULTURE FOR BACTERIA: CPT

## 2019-11-11 PROCEDURE — 74011000258 HC RX REV CODE- 258: Performed by: INTERNAL MEDICINE

## 2019-11-11 PROCEDURE — 93971 EXTREMITY STUDY: CPT

## 2019-11-11 PROCEDURE — 85730 THROMBOPLASTIN TIME PARTIAL: CPT

## 2019-11-11 PROCEDURE — 74011250636 HC RX REV CODE- 250/636: Performed by: INTERNAL MEDICINE

## 2019-11-11 PROCEDURE — 65270000029 HC RM PRIVATE

## 2019-11-11 PROCEDURE — 85025 COMPLETE CBC W/AUTO DIFF WBC: CPT

## 2019-11-11 PROCEDURE — 80048 BASIC METABOLIC PNL TOTAL CA: CPT

## 2019-11-11 PROCEDURE — 36415 COLL VENOUS BLD VENIPUNCTURE: CPT

## 2019-11-11 RX ORDER — FACIAL-BODY WIPES
10 EACH TOPICAL DAILY PRN
Status: DISCONTINUED | OUTPATIENT
Start: 2019-11-12 | End: 2019-11-14 | Stop reason: HOSPADM

## 2019-11-11 RX ORDER — FACIAL-BODY WIPES
10 EACH TOPICAL ONCE
Status: COMPLETED | OUTPATIENT
Start: 2019-11-11 | End: 2019-11-11

## 2019-11-11 RX ORDER — HEPARIN SODIUM 1000 [USP'U]/ML
80 INJECTION, SOLUTION INTRAVENOUS; SUBCUTANEOUS
Status: COMPLETED | OUTPATIENT
Start: 2019-11-11 | End: 2019-11-11

## 2019-11-11 RX ORDER — POLYETHYLENE GLYCOL 3350 17 G/17G
17 POWDER, FOR SOLUTION ORAL DAILY
Status: DISCONTINUED | OUTPATIENT
Start: 2019-11-12 | End: 2019-11-14 | Stop reason: HOSPADM

## 2019-11-11 RX ORDER — HEPARIN SODIUM 5000 [USP'U]/ML
2240 INJECTION, SOLUTION INTRAVENOUS; SUBCUTANEOUS ONCE
Status: COMPLETED | OUTPATIENT
Start: 2019-11-11 | End: 2019-11-11

## 2019-11-11 RX ORDER — DOCUSATE SODIUM 100 MG/1
100 CAPSULE, LIQUID FILLED ORAL DAILY
Status: DISCONTINUED | OUTPATIENT
Start: 2019-11-12 | End: 2019-11-11

## 2019-11-11 RX ORDER — DOCUSATE SODIUM 100 MG/1
100 CAPSULE, LIQUID FILLED ORAL DAILY
Status: DISCONTINUED | OUTPATIENT
Start: 2019-11-11 | End: 2019-11-14 | Stop reason: HOSPADM

## 2019-11-11 RX ORDER — HEPARIN SODIUM 1000 [USP'U]/ML
2240 INJECTION, SOLUTION INTRAVENOUS; SUBCUTANEOUS ONCE
Status: COMPLETED | OUTPATIENT
Start: 2019-11-11 | End: 2019-11-11

## 2019-11-11 RX ADMIN — Medication 10 ML: at 16:29

## 2019-11-11 RX ADMIN — PIPERACILLIN, TAZOBACTAM 4.5 G: 4; .5 INJECTION, POWDER, LYOPHILIZED, FOR SOLUTION INTRAVENOUS at 16:29

## 2019-11-11 RX ADMIN — PIPERACILLIN, TAZOBACTAM 4.5 G: 4; .5 INJECTION, POWDER, LYOPHILIZED, FOR SOLUTION INTRAVENOUS at 00:05

## 2019-11-11 RX ADMIN — HEPARIN SODIUM 24 UNITS/KG/HR: 10000 INJECTION, SOLUTION INTRAVENOUS at 14:17

## 2019-11-11 RX ADMIN — PIPERACILLIN, TAZOBACTAM 4.5 G: 4; .5 INJECTION, POWDER, LYOPHILIZED, FOR SOLUTION INTRAVENOUS at 08:28

## 2019-11-11 RX ADMIN — HEPARIN SODIUM 4480 UNITS: 1000 INJECTION INTRAVENOUS; SUBCUTANEOUS at 02:56

## 2019-11-11 RX ADMIN — HEPARIN SODIUM 24 UNITS/KG/HR: 10000 INJECTION, SOLUTION INTRAVENOUS at 16:32

## 2019-11-11 RX ADMIN — HEPARIN SODIUM 2240 UNITS: 5000 INJECTION INTRAVENOUS; SUBCUTANEOUS at 14:20

## 2019-11-11 RX ADMIN — BISACODYL 10 MG: 10 SUPPOSITORY RECTAL at 16:29

## 2019-11-11 RX ADMIN — HEPARIN SODIUM 2240 UNITS: 1000 INJECTION INTRAVENOUS; SUBCUTANEOUS at 23:02

## 2019-11-11 RX ADMIN — Medication 10 ML: at 23:02

## 2019-11-11 RX ADMIN — DOCUSATE SODIUM 100 MG: 100 CAPSULE, LIQUID FILLED ORAL at 13:02

## 2019-11-11 NOTE — PROGRESS NOTES
Bedside shift change report given to Calvin Otto RN (oncoming nurse) by Grace Jordan RN (offgoing nurse). Report included the following information SBAR, Kardex, Intake/Output, MAR and Recent Results.

## 2019-11-11 NOTE — PROGRESS NOTES
Hospitalist Progress Note          Mouna Verdugo NP  Please call  and page for questions. Call physician on-call through the  7pm-7am    Daily Progress Note: 11/11/2019    Primary care provider:None    Date of admission: 11/8/2019  3:14 PM    Admission Summary and Hospital Course:    From H&P 11/8/19:  Darien Morataya is a 30 y.o. female who presents with right lower chest pain    Patient is a 28-year-old female recently admitted for mononucleosis from 10/20 to 11/2. At that time patient was having rt cervicl/submad adenopahty, severe sore throat,  and hepatosplenomegaly. Since discharge, her above symptoms are much improved, no longer have sore throat. She developed severe right lower chest and right upper quadrant yesterday,  abdominal pain. described as sharp, stabbing pain that radiates to the right shoulder, severe, 10/10.  It is worse when she coughs or breathes.  It is also worse with moving.  Rest makes it better. Radha Frye has not had nausea, vomiting, diarrhea.  No skin color change.  No change in eye color.  No fevers or chills.   She saw her PCP today who was concerned about appendicitis so he sent her to ER,  although she denies any right lower quadrant abdominal pain.  No history of surgery to the abdomen.     was having trouble swallowing food last week, now resolved. \"    10/30/2019   -Throat Culture, RSV by PCR, Strep AG Screen, Respiratory Panel, Flu A&B, Mononucleosis screen, Hep panel, HIV and Bordetella by PCR, CXR - all negative  -EBV AB Panel - positive  -[id number] Activity - low    -US Abd LTD: IMPRESSION: 1. Hepatomegaly and hepatic steatosis. 2. Splenomegaly. 3. Small amount of gallbladder sludge with an adherent stone or sludge ball measuring 7 mm. No biliary duct dilatation.      EKG: Sinus tachycardia with short FL   Nonspecific T wave abnormality   Prolonged QT   No previous ECGs available     10/31/2019  -NM Hepatobiliary w/ intervention: IMPRESSION:  Normal gallbladder hepatobiliary imaging study. 11/8/2019   -Re-admitted now for right lateral CP pain    -CT Abd/Pelv w/ cont: IMPRESSION: 2.3 cm x 2.3 cm x 1.2 cm peripheral inferior right lower lobe fluid  collection likely to represent a pulmonary abscess. Trace right pleural fluid. Recommend follow-up to complete resolution. -CT Chest w/o cont: IMPRESSION:   1. 2.3 cm x 1.3 cm peripheral right lower lobe focal fluid collection likely to  represent a pulmonary abscess. 2. small right pleural effusion, slightly increased in size. 3. 1.4 cm x 1.8 cm superior segment left lower lobe opacity. 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 I suspect the organism will be Fusobacterium. The lung abscesses will likely respond and resolve with the antibiotic therapy and should not need to be biopsied. She does have a very small right pleural effusion. If this would increase in size, she will need a thoracentesis to rule out an empyema. Her Ambrose-Barr virus serologies last admission were consistent with remote infection but not recent infection. I anticipate at least 4 weeks of antibiotics.  PLAN:  1. Continue Zosyn. I will increase the dose to 4.5 g IV q.8 h. She will need at least 4 weeks of antibiotics. 2.  Discontinue vancomycin. 3.  CT scan of the neck with IV contrast to evaluate for right internal jugular thrombosis. \"     CT Chest and Neck Soft Tissue w/ Contrast 11/10/2019:  \"IMPRESSION:   1. Partial occlusion right internal jugular vein, from just below the skull base  to the lower neck as above.  Does not  extend intracranially or into the chest.  2. Mildly enlarged cervical lymph nodes. No evidence of neck abscess. 3. No significant change in 2.2 x 1.2 cm rim-enhancing fluid collection lateral  right lung base most consistent with pulmonary abscess. Associated pleural  effusion. 4. 1.5 x 1.5 cm nodular airspace disease left lower lobe is unchanged.   The findings were called to nurse Che Jiang and messaged via [email or web address] to Dr. Edson Levy on 11/10/2019 at 10:20 AM by myself. \"    11/10/2019-Consult for vasc surg; hyper coag studies sent (consult hem/onc if abnormal); Started Heparin gtt; RUE venous duplex ordered    11/11/2019-Per pulm and vasc, will need Eliquis x3 months. Will need f/u CT in 4-6 weeks for pulm. IV abx continue, likely home with IV abx later this week per ID. Subjective:   Patient much improved today. Has not required pain medicine since last night. Seen by pulmonary and ID. Mother also at bedside. They are well versed in the POC and have no questions at this time. Patient reports improvement in R lateral CP and R submand tenderness. Still has slight cough that she describes as a \"tickle. \" Denies midsternal CP, SOB, dizziness, bowel or bladder issues, no numbness/tingling or weakness. She wants to be able to shower. Discussed that heparin gtt can't be turned off for shower, but IV can be covered and patient can have IV pump right outside tub and be careful with tubing. IV can be covered for shower as usual.     Assessment/Plan:     Lung abscess:   -Small areas on R and L on CT; seen by pulm 11/9-small abscess vs PNA vs septic emboli  -Will go ahead and order echo-pending  -+clot RIJ-Heparin gtt started 11/10  -Sputum culture pending-so far unable to produce  -[facility] 11/9 - NGT[facility]  -UA unremarkable, culture shows mixed urogenital mushtaq  -Encourage PO fluids, IS q1h while awake, TCDB  -Pain significantly improved.  Current regimen - Norco 5/325 1 q4h prn, Toradol 30mg IV q6h PRN, morphine 2mg IV q4h PRN for breakthrough; Pain goal less than 5 - 10 today  -WBC much improved today at 10.7  -Vanc stopped by ID   -Continue zosyn  -Appreciate ID - likely needs IV abx as OP, will consult CM  -Appreciate pulm - repeat CXR in AM; needs repeat Chest CT 4-6 weeks    Partial Occlusion of RIJ on CT 11/10:  -Hyper-coag panel sent and pending - consult hem/onc if abnormal  -Seen by Dr. Penny Fajardo w/ heparin as IP, Eliquis x3 months as OP  -RUE venous Duplex per Dr. Shasha Shelton - pending for today  -Continue heparin gtt    Hepatomegaly and Splenomegaly d/t mononucleosis:  -hepatomegaly resolved on CT   -splenomegaly improved, asymptomatic  -Monitor for bleeding/bruising now on heparin gtt      See orders for other plans. Diet: Regular  VTE prophylaxis:  Heparin gtt  Code status: FULL  Discussed plan of care with: Patient/Family and Nurse. Pre-admission: lived at home. Independent  Discharge planning: Home once medically stable    Needed for Discharge: IV abx at home. Will need PICC prior to DC    Emergency Contact(s):  Extended Emergency Contact Information  Primary Emergency Contact: Shonna Kenney  Home Phone: [phone]  Mobile Phone: [phone]  Relation: Mother          Review of Systems:     Review of Systems:  Symptom  Y/N  Comments   Symptom  Y/N  Comments    Fever/Chills   N   Chest Pain  Y R LATERAL-IMPR   Poor Appetite   N   Edema   N    Cough  N   Abdominal Pain   N    Sputum  N   Joint Pain  N    SOB/ZARCO  N   Pruritis/Rash  N    Nausea/vomit  N   Tolerating PT/OT  Y    Diarrhea  N   Tolerating Diet  Y    Constipation  N   Other      Could not obtain due to:         Objective:   Physical Exam:     Visit Vitals  /75 (BP 1 Location: Left arm, BP Patient Position: At rest)   Pulse 78   Temp 98.1 °F (36.7 °C)   Resp 14   Wt 56 kg (123 lb 6.4 oz)   LMP 10/14/2019 (Approximate)   SpO2 98%   Breastfeeding?  No   BMI 22.57 kg/m²      O2 Device: Room air    Temp (24hrs), Av °F (36.7 °C), Min:97.9 °F (36.6 °C), Max:98.1 °F (36.7 °C)    No intake/output data recorded. 11/09 1901 - 11/11 0700  In: 950 [P.O.:950]  Out: 950 [Urine:950]      General:  Alert, cooperative, no distress, appears stated age. +R submand lymph nodes enlarged   Lungs:   CTA, no accessory muscle use   Heart:  Regular rate and rhythm, S1, S2 normal, no murmur, click, rub or gallop. Abdomen:   Soft, non-tender, non-distended. Bowel sounds normal. +splenomegaly - improving   Extremities: Extremities normal, atraumatic, no cyanosis or edema. Skin: Skin color, texture, turgor normal. No rashes or lesions   Neurologic: CNII-XII intact. Data Review:       Recent Days:  Recent Labs     11/11/19  0307 11/10/19  1345 11/08/19  1519   WBC 10.7 14.0* 22.4*   HGB 8.9* 9.7* 10.7*   HCT 27.6* 30.5* 33.8*   * 531* 478*     Recent Labs     11/11/19  0307 11/10/19  1345 11/10/19  0413  11/08/19  1519    138 137   < > 138   K 3.5 3.2* 3.6   < > 4.2    104 106   < > 102   CO2 29 29 27   < > 28   * 137* 111*   < > 99   BUN 14 13 13   < > 16   CREA 0.64 0.70 0.63   < > 0.64   CA 8.9 8.8 8.9   < > 9.8   ALB  --   --   --   --  3.2*   SGOT  --   --   --   --  14*   ALT  --   --   --   --  26   INR  --  1.0  --   --   --     < > = values in this interval not displayed. No results for input(s): PH, PCO2, PO2, HCO3, FIO2 in the last 72 hours.     24 Hour Results:  Recent Results (from the past 24 hour(s))   CBC WITH AUTOMATED DIFF    Collection Time: 11/10/19  1:45 PM   Result Value Ref Range    WBC 14.0 (H) 3.6 - 11.0 K/uL    RBC 3.42 (L) 3.80 - 5.20 M/uL    HGB 9.7 (L) 11.5 - 16.0 g/dL    HCT 30.5 (L) 35.0 - 47.0 %    MCV 89.2 80.0 - 99.0 FL    MCH 28.4 26.0 - 34.0 PG    MCHC 31.8 30.0 - 36.5 g/dL    RDW 13.0 11.5 - 14.5 %    PLATELET 910 (H) 725 - 400 K/uL    MPV 9.0 8.9 - 12.9 FL    NRBC 0.0 0  WBC    ABSOLUTE NRBC 0.00 0.00 - 0.01 K/uL    NEUTROPHILS 75 32 - 75 %    LYMPHOCYTES 14 12 - 49 %    MONOCYTES 8 5 - 13 % EOSINOPHILS 1 0 - 7 %    BASOPHILS 1 0 - 1 %    IMMATURE GRANULOCYTES 1 (H) 0.0 - 0.5 %    ABS. NEUTROPHILS 10.7 (H) 1.8 - 8.0 K/UL    ABS. LYMPHOCYTES 2.0 0.8 - 3.5 K/UL    ABS. MONOCYTES 1.1 (H) 0.0 - 1.0 K/UL    ABS. EOSINOPHILS 0.2 0.0 - 0.4 K/UL    ABS. BASOPHILS 0.1 0.0 - 0.1 K/UL    ABS. IMM. GRANS. 0.1 (H) 0.00 - 0.04 K/UL    DF AUTOMATED     METABOLIC PANEL, BASIC    Collection Time: 11/10/19  1:45 PM   Result Value Ref Range    Sodium 138 136 - 145 mmol/L    Potassium 3.2 (L) 3.5 - 5.1 mmol/L    Chloride 104 97 - 108 mmol/L    CO2 29 21 - 32 mmol/L    Anion gap 5 5 - 15 mmol/L    Glucose 137 (H) 65 - 100 mg/dL    BUN 13 6 - 20 MG/DL    Creatinine 0.70 0.55 - 1.02 MG/DL    BUN/Creatinine ratio 19 12 - 20      GFR est AA >60 >60 ml/min/1.73m2    GFR est non-AA >60 >60 ml/min/1.73m2    Calcium 8.8 8.5 - 10.1 MG/DL   PTT    Collection Time: 11/10/19  1:45 PM   Result Value Ref Range    aPTT 31.8 22.1 - 32.0 sec    aPTT, therapeutic range     58.0 - 77.0 SECS   PROTHROMBIN TIME + INR    Collection Time: 11/10/19  1:45 PM   Result Value Ref Range    INR 1.0 0.9 - 1.1      Prothrombin time 10.6 9.0 - 11.1 sec   FACTOR VIII    Collection Time: 11/10/19  1:45 PM   Result Value Ref Range    Factor VIII 217 (H) 80 - 200 %   FIBRINOGEN    Collection Time: 11/10/19  1:45 PM   Result Value Ref Range    Fibrinogen 636 (H) 200 - 475 mg/dL   SAMPLES BEING HELD    Collection Time: 11/10/19  2:15 PM   Result Value Ref Range    SAMPLES BEING HELD 2BLU     COMMENT        Add-on orders for these samples will be processed based on acceptable specimen integrity and analyte stability, which may vary by analyte.    PTT    Collection Time: 11/10/19 11:29 PM   Result Value Ref Range    aPTT 37.5 (H) 22.1 - 32.0 sec    aPTT, therapeutic range     58.0 - 34.8 SECS   METABOLIC PANEL, BASIC    Collection Time: 11/11/19  3:07 AM   Result Value Ref Range    Sodium 138 136 - 145 mmol/L    Potassium 3.5 3.5 - 5.1 mmol/L    Chloride 105 97 - 108 mmol/L    CO2 29 21 - 32 mmol/L    Anion gap 4 (L) 5 - 15 mmol/L    Glucose 127 (H) 65 - 100 mg/dL    BUN 14 6 - 20 MG/DL    Creatinine 0.64 0.55 - 1.02 MG/DL    BUN/Creatinine ratio 22 (H) 12 - 20      GFR est AA >60 >60 ml/min/1.73m2    GFR est non-AA >60 >60 ml/min/1.73m2    Calcium 8.9 8.5 - 10.1 MG/DL   CBC WITH AUTOMATED DIFF    Collection Time: 11/11/19  3:07 AM   Result Value Ref Range    WBC 10.7 3.6 - 11.0 K/uL    RBC 3.10 (L) 3.80 - 5.20 M/uL    HGB 8.9 (L) 11.5 - 16.0 g/dL    HCT 27.6 (L) 35.0 - 47.0 %    MCV 89.0 80.0 - 99.0 FL    MCH 28.7 26.0 - 34.0 PG    MCHC 32.2 30.0 - 36.5 g/dL    RDW 13.0 11.5 - 14.5 %    PLATELET 387 (H) 165 - 400 K/uL    MPV 8.9 8.9 - 12.9 FL    NRBC 0.0 0  WBC    ABSOLUTE NRBC 0.00 0.00 - 0.01 K/uL    NEUTROPHILS 61 32 - 75 %    LYMPHOCYTES 28 12 - 49 %    MONOCYTES 8 5 - 13 %    EOSINOPHILS 2 0 - 7 %    BASOPHILS 1 0 - 1 %    IMMATURE GRANULOCYTES 0 0.0 - 0.5 %    ABS. NEUTROPHILS 6.6 1.8 - 8.0 K/UL    ABS. LYMPHOCYTES 3.0 0.8 - 3.5 K/UL    ABS. MONOCYTES 0.8 0.0 - 1.0 K/UL    ABS. EOSINOPHILS 0.2 0.0 - 0.4 K/UL    ABS. BASOPHILS 0.1 0.0 - 0.1 K/UL    ABS. IMM.  GRANS. 0.0 0.00 - 0.04 K/UL    DF AUTOMATED         Problem List:  Problem List as of 11/11/2019 Date Reviewed: 11/8/2019          Codes Class Noted - Resolved    Lung abscess ([facility] ICD-10-CM: J85.2  ICD-9-CM: 513.0  11/8/2019 - Present        SIRS (systemic inflammatory response syndrome) ([facility] ICD-10-CM: R65.10  ICD-9-CM: 995.90  10/30/2019 - Present        Prolonged QT interval ICD-10-CM: R94.31  ICD-9-CM: 794.31  10/30/2019 - Present        GISELLA (acute kidney injury) ([facility] ICD-10-CM: N17.9  ICD-9-CM: 584.9  10/30/2019 - Present        Thrombocytopenia ([facility] ICD-10-CM: D69.6  ICD-9-CM: 287.5  10/30/2019 - Present        Hypokalemia ICD-10-CM: E87.6  ICD-9-CM: 276.8  10/30/2019 - Present        Hypophosphatemia ICD-10-CM: E83.39  ICD-9-CM: 275.3  10/30/2019 - Present        Hyponatremia ICD-10-CM: E87.1  ICD-9-CM: 276.1  10/30/2019 - Present              Medications reviewed  Current Facility-Administered Medications   Medication Dose Route Frequency    piperacillin-tazobactam (ZOSYN) 4.5 g in 0.9% sodium chloride (MBP/ADV) 100 mL  4.5 g IntraVENous Q8H    heparin 25,000 units in D5W 250 ml infusion  18-36 Units/kg/hr IntraVENous TITRATE    morphine injection 2 mg  2 mg IntraVENous Q4H PRN    sodium chloride (NS) flush 5-40 mL  5-40 mL IntraVENous Q8H    sodium chloride (NS) flush 5-40 mL  5-40 mL IntraVENous PRN    naloxone (NARCAN) injection 0.4 mg  0.4 mg IntraVENous PRN    diphenhydrAMINE (BENADRYL) capsule 25 mg  25 mg Oral Q4H PRN    ondansetron (ZOFRAN) injection 4 mg  4 mg IntraVENous Q4H PRN    acetaminophen (TYLENOL) tablet 650 mg  650 mg Oral Q4H PRN    HYDROcodone-acetaminophen (NORCO) 5-325 mg per tablet 1 Tab  1 Tab Oral Q4H PRN    ketorolac (TORADOL) injection 30 mg  30 mg IntraVENous Q6H PRN       Care Plan discussed with: Patient, mother, RN, Pulm NP  Total time spent with patient: 30 minutes.     Nathanael Ernandez NP  Hospitalist  Providers can reach me directly at [phone] or [facility]

## 2019-11-11 NOTE — PROGRESS NOTES
Pulmonary, Critical Care, and Sleep Medicine~Progress Note    Name: Emmy Moreland MRN: [id number]   : 1995 [facility]   Date: 2019 11:22 AM Admission: 2019     IMPRESSION:   · Abnormal CT chest: small right pleural effusion, peripheral right lower lobe 2.3 cm x 1.2 cm focal fluid collection, left lower lobe 1.4 cm x 1.8 cm mass like opacity; felt to be lemierre's syndrome with Septic emboli  · Right IJ DVT, first offense. HD stable  · Recent admission for presumed EBV  · Blood cultures 10/29/19 w/ GNR  bottles      PLAN:   · Eliquis for three months; on heparin gtt now  · hypercoag work up in progress   · F/u cultures  · Will require repeat CT in 4 - 6 weeks  · Chest x-ray tomorrow monitor the pleural effusion   · d/w Dr. Osmany Guardado      Daily Progression:      Feeling better today. Less pain. Pleasant       HPI:  31yo female nonsmoker w/out sig pmhx admitted 19 w/ right sided pleuritic cp. Recently admitted to this facility 10/30 - 19 w/ acute illness. Treated for acute EBV. CXR 10/30 clear. BC done while inpt  GNR. Pain began about 2 days ago. Worse w/ deep inspiration. No fevers. Very minimal nonproductive cough. Mild dyspnea. No wheezing. Presented to PCP , noted RUQ abdominal tenderness, referred to ED. CT abdomen w/ small right pleural effusion, 2.3 cm x 2.3 cm x 1.3 cm focal fluid collection. CT chest w/ small right pleural effusion, peripheral right lower lobe 2.3 cm x 1.2 cm focal fluid collection, left lower lobe 1.4 cm x 1.8 cm mass like opacity. Placed on empiric abx. Sitting up in bed. Mother at bedside. Pain some improved, worse w/ deep breathing. Tmax 100.6. WBC 22. Oxygenating well on RA. Nonsmoker w/out hx of asthma. No family hx of lung disease. Cough nonproductive. Denies drug use.      PMHX: negative    Surgical HX: none    Social HX: denies drug use, nonsmoker    Family HX: noncontributory    Home medications: birth control      I have reviewed the labs and previous days notes. A comprehensive review of systems was negative except for that written in the HPI. History reviewed. No pertinent past medical history. History reviewed. No pertinent surgical history. Prior to Admission medications    Medication Sig Start Date End Date Taking? Authorizing Provider   acetaminophen (TYLENOL) 325 mg tablet Take 325 mg by mouth every four (4) hours as needed for Pain. Yes Provider, Historical   norethindrone-ethinyl estradiol (JUNEL FE 1/20, 28,) 1 mg-20 mcg (21)/75 mg (7) tab Take 1 Tab by mouth daily. Yes Provider, Historical   ibuprofen (ADVIL) 200 mg tablet Take 200 mg by mouth every six (6) hours as needed for Pain. Yes Provider, Historical     Allergies   Allergen Reactions    Sulfa (Sulfonamide Antibiotics) Hives     19 \"Extreme hives\" per Mother      Social History     Tobacco Use    Smoking status: Never Smoker    Smokeless tobacco: Never Used   Substance Use Topics    Alcohol use: Never     Frequency: Never      Family History   Problem Relation Age of Onset    Stroke Maternal Grandfather     Cancer Neg Hx     Diabetes Neg Hx     Heart Disease Neg Hx        OBJECTIVE:     Vital Signs:       Visit Vitals  /75 (BP 1 Location: Left arm, BP Patient Position: At rest)   Pulse 78   Temp 98.1 °F (36.7 °C)   Resp 14   Wt 56 kg (123 lb 6.4 oz)   LMP 10/14/2019 (Approximate)   SpO2 98%   Breastfeeding? No   BMI 22.57 kg/m²      Temp (24hrs), Av °F (36.7 °C), Min:97.9 °F (36.6 °C), Max:98.1 °F (36.7 °C)     Intake/Output:     Last shift: No intake/output data recorded.     Last 3 shifts:  1901 -  0700  In: 950 [P.O.:950]  Out: 950 [Urine:950]          Intake/Output Summary (Last 24 hours) at 2019 0953  Last data filed at 11/10/2019 2308  Gross per 24 hour   Intake 950 ml   Output    Net 950 ml         Imaging:  I have personally reviewed these radiographic films and reports:     I have personally reviewed PFTs:          Ventilation:   Mode Rate Tidal Volume Pressure Suppport FiO2/LPM PEEP Other               Peak airway pressure:      Minute ventilation:      Trilogy:        Physical Exam:                                        Exam Findings Other   General: No resp distress noted, appears stated age    [de-identified]:  No ulcers, JVD not elevated, no cervical LAD    Chest: No pectus deformity, normal chest rise b/l    HEART:  RRR, no murmurs/rubs/gallops    Lungs:  CTA b/l, no rhonchi/crackles/wheeze, diminished BS right base    ABD: Soft/NT, non rigid mildly distended    EXT: No cyanosis/clubbing/edema, normal peripheral pulses    Skin: No rashes or ulcers, no mottling    Neuro:  A/O x 3        Medications:  Current Facility-Administered Medications   Medication Dose Route Frequency    piperacillin-tazobactam (ZOSYN) 4.5 g in 0.9% sodium chloride (MBP/ADV) 100 mL  4.5 g IntraVENous Q8H    heparin 25,000 units in D5W 250 ml infusion  18-36 Units/kg/hr IntraVENous TITRATE    morphine injection 2 mg  2 mg IntraVENous Q4H PRN    sodium chloride (NS) flush 5-40 mL  5-40 mL IntraVENous Q8H    sodium chloride (NS) flush 5-40 mL  5-40 mL IntraVENous PRN    naloxone (NARCAN) injection 0.4 mg  0.4 mg IntraVENous PRN    diphenhydrAMINE (BENADRYL) capsule 25 mg  25 mg Oral Q4H PRN    ondansetron (ZOFRAN) injection 4 mg  4 mg IntraVENous Q4H PRN    acetaminophen (TYLENOL) tablet 650 mg  650 mg Oral Q4H PRN    HYDROcodone-acetaminophen (NORCO) 5-325 mg per tablet 1 Tab  1 Tab Oral Q4H PRN    ketorolac (TORADOL) injection 30 mg  30 mg IntraVENous Q6H PRN       Labs:  ABG No results for input(s): PHI, PCO2I, PO2I, HCO3I, SO2I, FIO2I in the last 72 hours.      CBC Recent Labs     11/11/19  0307 11/10/19  1345 11/08/19  1519   WBC 10.7 14.0* 22.4*   HGB 8.9* 9.7* 10.7*   HCT 27.6* 30.5* 33.8*   * 531* 478*   MCV 89.0 89.2 90.4   MCH 28.7 28.4 28.6 Metabolic  Panel Recent Labs     11/11/19  0307 11/10/19  1345 11/10/19  0413  11/08/19  1519    138 137   < > 138   K 3.5 3.2* 3.6   < > 4.2    104 106   < > 102   CO2 29 29 27   < > 28   * 137* 111*   < > 99   BUN 14 13 13   < > 16   CREA 0.64 0.70 0.63   < > 0.64   CA 8.9 8.8 8.9   < > 9.8   ALB  --   --   --   --  3.2*   SGOT  --   --   --   --  14*   ALT  --   --   --   --  26   INR  --  1.0  --   --   --     < > = values in this interval not displayed.         Pertinent Labs                Stefano Johnson PA-C  11/11/2019

## 2019-11-11 NOTE — PROGRESS NOTES
Blood drawn for repeat PTT and sent to lab at 0945 am.    1146 am -- no results found for ptt sent at 0945 am, call placed to lab. Now lab is claiming that they have not yet received blood after calling twice to report that blood could not be accepted because nurse was still signed into the computer. 1253  -- reported that lab called and was going to FAX results of PTT drawn at 0945 am to the unit. 1314 pm  -  No fax yet received from lab on results of PTT sent at 0945 am.  Now lab does not know who called the unit. Spoke to Shaun, results faxed. 1335 pm  -  Results of 0945 am PTT was 44.0 per fax, .fax received at [phone] on 11/11/19. Spoke with clinical pharmacist that states that we needed to calculate the dose of heparin bolus that pt needed and put the order in the computer. Pratibha Phillip RN CCL verified results of PTT and bolus dose as well as increase in heparin dose. [address] called to verify results of PTT, pharmacist wants unit to scan results into the [facility] care system. Informed pharmacist that nursing does not have ability to scan paperwork into the system. Pharmacist to come to unit to verify current PTT and verify orders for heparin bolus of 2240 units which is 40 units / kg bolus and increasing heparin drip from 22 units/kg/hr to 24 units/kg/hr, which is order rate increase of 2 units/kg/hr.

## 2019-11-11 NOTE — PROGRESS NOTES
Infectious Diseases Progress Note    Antibiotic Summary:  Vancomycin  --   Zosyn   -- present      Subjective:     She feels better. The right pleuritic CP is better. She can take a deeper breath. ROS:  Constitutional: no fever or rigors today  HEENT: no HA  Skin: no pruritis  Resp: decreased pleuritic CP  CV: no symptoms of CHF  GI: no N, V, D.  MS: no joint complaints    Objective:     Vitals:   Visit Vitals  /69   Pulse 81   Temp 97.9 °F (36.6 °C)   Resp 16   Wt 56 kg (123 lb 6.4 oz)   LMP 10/14/2019 (Approximate)   SpO2 97%   Breastfeeding? No   BMI 22.57 kg/m²        Tmax:  Temp (24hrs), Av °F (36.7 °C), Min:97.9 °F (36.6 °C), Max:98.1 °F (36.7 °C)      Exam:  General appearance: alert, no distress  Eyes: sclera and conj benign  Neck: thrombosed RIJ palpable  Lungs: clear to auscultation bilaterally with decrased BS right base  Heart: regular rate and rhythm  Abdomen: soft, non-tender. Bowel sounds normal. 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 The lung abscesses will likely respond to antibiotics and should not need biopsy. She has a very small right pleural effusion. If this increases in size, she will need a thoracentesis to R/O empyema. She appears to be responding to antibiotics. Plan:     1. Continue Zosyn    2.  Repeat blood cultures in AM      Darek Farrar., MD

## 2019-11-11 NOTE — CDMP QUERY
Pt admitted with lung abscess, noted to have SIRS criteria. If possible, please document in the progress notes and discharge summary if you are evaluating and /or treating any of the following:  Sepsis, present on admission, causative organism ____________ (please specify)  Sepsis, now resolved, causative organism ________________ (please specify)  No Sepsis, localized infection only _________ (please specify)  Sepsis was ruled out (include corresponding diagnosis for patients clinical picture and treatment)  Other, please specify  Clinically unable to determine The medical record reflects the following: 
   Risk Factors: lung abscess, EBV infection Clinical Indicators: WBC: 22.4, -106 on admission, Temp to 100.6  H&P notes lung abscess- poss pna after EBV infection  11/9 ID- Lemierre's Syndrome -- Septic thrombophlebitis of the right internal jugular vein Treatment: fluid bolus, zosyn, vanc, blood cx, ID consult, monitoring Thank you, Nallely Tomas RN 
[facility] 
[phone]

## 2019-11-11 NOTE — CONSULTS
Surgery Consult    Subjective:      Janis Tobias is a 30 y.o. female who presents for evaluation of right chest and neck pain. She recently was given the diagnosis of mono when she was not feeling well. However end of last week she noted increased right lower chest pain and neck pain. It was very hard and tender. She was found to have lung abscess and has been getting better on IV antibiotics. She underwent a CT scan that showed a nonocclusive right IJ thrombus. Vascular is consulted for evaluation. History reviewed. No pertinent past medical history. History reviewed. No pertinent surgical history.    Family History   Problem Relation Age of Onset    Stroke Maternal Grandfather     Cancer Neg Hx     Diabetes Neg Hx     Heart Disease Neg Hx      Social History     Socioeconomic History    Marital status: SINGLE     Spouse name: Not on file    Number of children: Not on file    Years of education: Not on file    Highest education level: Not on file   Tobacco Use    Smoking status: Never Smoker    Smokeless tobacco: Never Used   Substance and Sexual Activity    Alcohol use: Never     Frequency: Never    Drug use: Never    Sexual activity: Not Currently     Birth control/protection: Pill      Current Facility-Administered Medications   Medication Dose Route Frequency Provider Last Rate Last Dose    piperacillin-tazobactam (ZOSYN) 4.5 g in 0.9% sodium chloride (MBP/ADV) 100 mL  4.5 g IntraVENous Q8H Dilan Santos MD 25 mL/hr at 11/11/19 0005 4.5 g at 11/11/19 0005    heparin 25,000 units in D5W 250 ml infusion  18-36 Units/kg/hr IntraVENous TITRATE Brandi Rivers NP 12.3 mL/hr at 11/11/19 0257 22 Units/kg/hr at 11/11/19 0257    morphine injection 2 mg  2 mg IntraVENous Q4H PRN Brandi Rivers NP        sodium chloride (NS) flush 5-40 mL  5-40 mL IntraVENous Q8H Obdulia Wolfe MD   10 mL at 11/10/19 2200    sodium chloride (NS) flush 5-40 mL  5-40 mL IntraVENous PRN Paul Henry MD   10 mL at 19 0914    naloxone [facility]) injection 0.4 mg  0.4 mg IntraVENous PRN Dorette Schilder, MD        diphenhydrAMINE (BENADRYL) capsule 25 mg  25 mg Oral Q4H PRN Dorette Schilder, MD        ondansetron [facility]) injection 4 mg  4 mg IntraVENous Q4H PRN Dorette Schilder, MD   4 mg at 11/10/19 1819    acetaminophen (TYLENOL) tablet 650 mg  650 mg Oral Q4H PRN Dorette Schilder, MD   650 mg at 11/10/19 2049    HYDROcodone-acetaminophen (NORCO) 5-325 mg per tablet 1 Tab  1 Tab Oral Q4H PRN Dorette Schilder, MD   1 Tab at 11/10/19 1716    ketorolac (TORADOL) injection 30 mg  30 mg IntraVENous Q6H PRN Dorette Schilder, MD   30 mg at 11/10/19 2342        Allergies   Allergen Reactions    Sulfa (Sulfonamide Antibiotics) Hives     19 \"Extreme hives\" per Mother       Review of Systems:  A comprehensive review of systems was negative except for that written in the History of Present Illness. Objective:        Patient Vitals for the past 8 hrs:   BP Temp Pulse Resp SpO2   11/10/19 2310 116/71 98 °F (36.7 °C) 70 16 100 %       Temp (24hrs), Av °F (36.7 °C), Min:97.9 °F (36.6 °C), Max:98 °F (36.7 °C)      Physical Exam:  GENERAL: alert, cooperative, no distress, appears stated age, THROAT & NECK: normal and no erythema or exudates noted. , LUNG: clear to auscultation bilaterally, HEART: regular rate and rhythm, S1, S2 normal, no murmur, click, rub or gallop, ABDOMEN: soft, non-tender. Bowel sounds normal. No masses,  no organomegaly, EXTREMITIES:  extremities normal, atraumatic, no cyanosis or edema, SKIN: Normal., NEUROLOGIC: AOx3. Gait normal. Reflexes and motor strength normal and symmetric. Cranial nerves 2-12 and sensation grossly intact. CTA reviewed, nonocclusive DVT in right IJ  Assessment:     24 y./o WF with lung abscesses and right IJ DVT    Plan:     Ms. Raul Rivera is a pleasant 29 y/o female with incidental finding of a right IJ DVT. This is associated with significant bacterial infection and neck lymphadenopathy.   The local inflammation likely has caused the non-occlusive DVT. I would treat this as a provoked DVT for three months. Once no further procedures are necessary, I would change to Eliquis to go home for three months. A basic hypercoag panel has already been sent and I would not send her to hematology unless there was an abnormal finding. Thanks for the consult, she can call me with any further questions.

## 2019-11-11 NOTE — PROGRESS NOTES
Transition of Care Plan:      -home with IV ABX when medically stable  -Will need PICC placement      Reason for Admission:   Lung abscess                   RRAT Score:  7                   Plan for utilizing home health:  pending                        Current Advanced Directive/Advance Care Plan: not on file                                         Cm met with patient and her mother Bisi Ramos, [phone]) in the room to explain role and offer support. Patient confirmed she lives alone but will be staying with her mother at discharge for a couple of days. Patient is aware she will need IV ABX at discharge and cm briefly went over how that process would work. Patient is listed as having both [employer] and [address] but only has [employer]. Once IV ABX orders are determined, cm will submit to an infusion company.      Care Management Interventions  PCP Verified by CM: Yes(Dr. Hortencia Pelaez)  Palliative Care Criteria Met (RRAT>21 & CHF Dx)?: No  Transition of Care Consult (CM Consult): [address]: No  Discharge Durable Medical Equipment: No  Health Maintenance Reviewed: Yes  Physical Therapy Consult: No  Occupational Therapy Consult: No  Speech Therapy Consult: No  Current Support Network: Own Home  Confirm Follow Up Transport: Family  Plan discussed with Pt/Family/Caregiver: Yes  Freedom of Choice Offered: Yes  [place] Resource Information Provided?: No  Discharge Location  Discharge Placement: Home with home health    Tracie Vega 80, ACM

## 2019-11-11 NOTE — PROGRESS NOTES
Hospitalist Progress Note  Benjamin Kirk MD  Office: [phone]        Date of Service:  2019  NAME:  Shirley Sams  :  1995  MRN:  [id number]    Pt seen and examined discussed care plan  Please see the more detailed note from the NP. I agree with NP's assessment and plan. Details in NP note    Hospital Problems  Date Reviewed: 2019          Codes Class Noted POA    Lung abscess [facility]) ICD-10-CM: J85.2  ICD-9-CM: 513.0  2019 Unknown                Review of Systems:   Pertinent items are noted in HPI. Vital Signs:    Last 24hrs VS reviewed since prior progress note. Most recent are:  Visit Vitals  /75 (BP 1 Location: Left arm, BP Patient Position: At rest)   Pulse 78   Temp 98.1 °F (36.7 °C)   Resp 14   Wt 56 kg (123 lb 6.4 oz)   SpO2 98%   Breastfeeding? No   BMI 22.57 kg/m²     Ct Neck Soft Tissue W Cont    Result Date: 11/10/2019  IMPRESSION: 1. Partial occlusion right internal jugular vein, from just below the skull base to the lower neck as above. Does not  extend intracranially or into the chest. 2. Mildly enlarged cervical lymph nodes. No evidence of neck abscess. 3. No significant change in 2.2 x 1.2 cm rim-enhancing fluid collection lateral right lung base most consistent with pulmonary abscess. Associated pleural effusion. 4. 1.5 x 1.5 cm nodular airspace disease left lower lobe is unchanged. The findings were called to nurse Sheri Singleton and messaged via [employer] to Dr. Vivek Shine on 11/10/2019 at 10:20 AM by myself. [phone]    Ct Chest Wo Cont    Result Date: 2019  IMPRESSION: 1. 2.3 cm x 1.3 cm peripheral right lower lobe focal fluid collection likely to represent a pulmonary abscess. 2. small right pleural effusion, slightly increased in size. 3. 1.4 cm x 1.8 cm superior segment left lower lobe opacity. Ct Chest W Cont    Result Date: 11/10/2019  IMPRESSION: 1.  Partial occlusion right internal jugular vein, from just below the skull base to the lower neck as above. Does not  extend intracranially or into the chest. 2. Mildly enlarged cervical lymph nodes. No evidence of neck abscess. 3. No significant change in 2.2 x 1.2 cm rim-enhancing fluid collection lateral right lung base most consistent with pulmonary abscess. Associated pleural effusion. 4. 1.5 x 1.5 cm nodular airspace disease left lower lobe is unchanged. The findings were called to nurse Earma Cools and messaged via [employer] to Dr. Ryanne Hartman on 11/10/2019 at 10:20 AM by myself. [facility]    Result Date: 11/8/2019  IMPRESSION: 2.3 cm x 2.3 cm x 1.2 cm peripheral inferior right lower lobe fluid collection likely to represent a pulmonary abscess. Trace right pleural fluid. Recommend follow-up to complete resolution. Physical Examination:                     Resp:  CTA bilaterally. CV:  Regular rhythm, normal rate    GI:  Soft, non distended, non tender. bs+    Musculoskeletal:  No edema,     Neurologic:  Moves all extremities. AAOx3, CN II-XII reviewed         PLAN:     1. Sepsis POA from Small lung abscess with rt plural effusion seen by ID will need 6 weeks of IV abx per Dr. Ryanne Hartman  2. Partial Occlusion of RIJ on CT 11/10: will need [address] for 3 months   3.  Pt will need hematology consult and hyper coagulable w/u   D/w NP and family in length and agreed to plan     ______________________________________________________________________  EXPECTED LENGTH OF STAY: - - -  ACTUAL LENGTH OF STAY:          3                 Alvaro Joyce MD

## 2019-11-11 NOTE — PROGRESS NOTES
Pt returned to unit from having doppler study of RUE done. Spoke with dr Winfred Sicard earlier for /o constipation, order obtained for colace and pt has gotten dose for today. Spoke with Deisy DUPREE because pt states that she feels the need to have a bm but is unable to and that she is very uncomfortable. PA states that she will place some orders for pt.

## 2019-11-12 ENCOUNTER — APPOINTMENT (OUTPATIENT)
Dept: NON INVASIVE DIAGNOSTICS | Age: 24
DRG: 871 | End: 2019-11-12
Attending: NURSE PRACTITIONER
Payer: COMMERCIAL

## 2019-11-12 ENCOUNTER — APPOINTMENT (OUTPATIENT)
Dept: GENERAL RADIOLOGY | Age: 24
DRG: 871 | End: 2019-11-12
Attending: PHYSICIAN ASSISTANT
Payer: COMMERCIAL

## 2019-11-12 LAB
ANAEROBIC ID BY MALDI, ORJ12T: NORMAL
ANION GAP SERPL CALC-SCNC: 6 MMOL/L (ref 5–15)
APTT PPP: 64.1 SEC (ref 22.1–32)
APTT PPP: 77.4 SEC (ref 22.1–32)
BASOPHILS # BLD: 0.1 K/UL (ref 0–0.1)
BASOPHILS NFR BLD: 1 % (ref 0–1)
BUN SERPL-MCNC: 8 MG/DL (ref 6–20)
BUN/CREAT SERPL: 11 (ref 12–20)
CALCIUM SERPL-MCNC: 9.3 MG/DL (ref 8.5–10.1)
CHLORIDE SERPL-SCNC: 106 MMOL/L (ref 97–108)
CO2 SERPL-SCNC: 26 MMOL/L (ref 21–32)
CREAT SERPL-MCNC: 0.74 MG/DL (ref 0.55–1.02)
DIFFERENTIAL METHOD BLD: ABNORMAL
EOSINOPHIL # BLD: 0.2 K/UL (ref 0–0.4)
EOSINOPHIL NFR BLD: 2 % (ref 0–7)
ERYTHROCYTE [DISTWIDTH] IN BLOOD BY AUTOMATED COUNT: 12.7 % (ref 11.5–14.5)
GLUCOSE SERPL-MCNC: 100 MG/DL (ref 65–100)
HCT VFR BLD AUTO: 28.1 % (ref 35–47)
HGB BLD-MCNC: 8.7 G/DL (ref 11.5–16)
IMM GRANULOCYTES # BLD AUTO: 0 K/UL (ref 0–0.04)
IMM GRANULOCYTES NFR BLD AUTO: 0 % (ref 0–0.5)
LYMPHOCYTES # BLD: 2.7 K/UL (ref 0.8–3.5)
LYMPHOCYTES NFR BLD: 29 % (ref 12–49)
MCH RBC QN AUTO: 27.7 PG (ref 26–34)
MCHC RBC AUTO-ENTMCNC: 31 G/DL (ref 30–36.5)
MCV RBC AUTO: 89.5 FL (ref 80–99)
MONOCYTES # BLD: 0.6 K/UL (ref 0–1)
MONOCYTES NFR BLD: 7 % (ref 5–13)
NEUTS SEG # BLD: 5.8 K/UL (ref 1.8–8)
NEUTS SEG NFR BLD: 61 % (ref 32–75)
NRBC # BLD: 0 K/UL (ref 0–0.01)
NRBC BLD-RTO: 0 PER 100 WBC
PLATELET # BLD AUTO: 564 K/UL (ref 150–400)
PMV BLD AUTO: 8.7 FL (ref 8.9–12.9)
POTASSIUM SERPL-SCNC: 3.6 MMOL/L (ref 3.5–5.1)
RBC # BLD AUTO: 3.14 M/UL (ref 3.8–5.2)
SODIUM SERPL-SCNC: 138 MMOL/L (ref 136–145)
SOURCE, RSRC63: NORMAL
THERAPEUTIC RANGE,PTTT: ABNORMAL SECS (ref 58–77)
THERAPEUTIC RANGE,PTTT: ABNORMAL SECS (ref 58–77)
WBC # BLD AUTO: 9.3 K/UL (ref 3.6–11)

## 2019-11-12 PROCEDURE — 74011250636 HC RX REV CODE- 250/636: Performed by: INTERNAL MEDICINE

## 2019-11-12 PROCEDURE — 36415 COLL VENOUS BLD VENIPUNCTURE: CPT

## 2019-11-12 PROCEDURE — 85025 COMPLETE CBC W/AUTO DIFF WBC: CPT

## 2019-11-12 PROCEDURE — 74011250637 HC RX REV CODE- 250/637: Performed by: HOSPITALIST

## 2019-11-12 PROCEDURE — 71045 X-RAY EXAM CHEST 1 VIEW: CPT

## 2019-11-12 PROCEDURE — 93306 TTE W/DOPPLER COMPLETE: CPT

## 2019-11-12 PROCEDURE — 85730 THROMBOPLASTIN TIME PARTIAL: CPT

## 2019-11-12 PROCEDURE — 74011250636 HC RX REV CODE- 250/636: Performed by: NURSE PRACTITIONER

## 2019-11-12 PROCEDURE — 74011250637 HC RX REV CODE- 250/637: Performed by: NURSE PRACTITIONER

## 2019-11-12 PROCEDURE — 65270000029 HC RM PRIVATE

## 2019-11-12 PROCEDURE — 80048 BASIC METABOLIC PNL TOTAL CA: CPT

## 2019-11-12 PROCEDURE — 74011000258 HC RX REV CODE- 258: Performed by: INTERNAL MEDICINE

## 2019-11-12 RX ADMIN — DOCUSATE SODIUM 100 MG: 100 CAPSULE, LIQUID FILLED ORAL at 09:30

## 2019-11-12 RX ADMIN — PIPERACILLIN, TAZOBACTAM 4.5 G: 4; .5 INJECTION, POWDER, LYOPHILIZED, FOR SOLUTION INTRAVENOUS at 07:05

## 2019-11-12 RX ADMIN — HEPARIN SODIUM 26 UNITS/KG/HR: 10000 INJECTION, SOLUTION INTRAVENOUS at 14:16

## 2019-11-12 RX ADMIN — PIPERACILLIN, TAZOBACTAM 4.5 G: 4; .5 INJECTION, POWDER, LYOPHILIZED, FOR SOLUTION INTRAVENOUS at 00:42

## 2019-11-12 RX ADMIN — PIPERACILLIN, TAZOBACTAM 4.5 G: 4; .5 INJECTION, POWDER, LYOPHILIZED, FOR SOLUTION INTRAVENOUS at 16:16

## 2019-11-12 RX ADMIN — PIPERACILLIN, TAZOBACTAM 4.5 G: 4; .5 INJECTION, POWDER, LYOPHILIZED, FOR SOLUTION INTRAVENOUS at 23:29

## 2019-11-12 NOTE — ROUTINE PROCESS
Bedside and Verbal shift change report given to [address] (oncoming nurse) by Lauren Nguyễn RN (offgoing nurse). Report included the following information SBAR, Kardex, Intake/Output, MAR and Recent Results.

## 2019-11-12 NOTE — PROGRESS NOTES
Called to pt's room due to rectal bleeding, pt has been complaining of feeling the need to have a bowel movement all day, this nurse has spoken with Dr Don Harry and Jose A Sinclair NP. Pt has had colace and dulcolax supp. Pt is now in bathroom straining to have bm after suppository and has had some blood passed from rectum. 1925 -- spoke to David Levy NP. He is aware and we are to continue to monitor pt for further bleeding from the rectum, pt encourage to not continue to strain to have Bowel movement. ROBINA Bell states he would put in orders for miralax and also he would make  Jose A Sinclair NP , aware of the situation. Bedside and Verbal shift change report given to Otis  (oncoming nurse) by Mihai Castillo (offgoing nurse). Report included the following information SBAR and Kardex.

## 2019-11-12 NOTE — PROGRESS NOTES
Hospitalist Progress Note          Oumou Atkinson NP  Please call  and page for questions. Call physician on-call through the  7pm-7am    Daily Progress Note: 11/12/2019    Primary care provider:Kandi Casiano MD    Date of admission: 11/8/2019  3:14 PM    Admission Summary and Hospital Course:    From H&P 11/8/19:  Lashell Sapp is a 30 y.o. female who presents with right lower chest pain    Patient is a 23-year-old female recently admitted for mononucleosis from 10/20 to 11/2. At that time patient was having rt cervicl/submad adenopahty, severe sore throat,  and hepatosplenomegaly. Since discharge, her above symptoms are much improved, no longer have sore throat. She developed severe right lower chest and right upper quadrant yesterday,  abdominal pain. described as sharp, stabbing pain that radiates to the right shoulder, severe, 10/10.  It is worse when she coughs or breathes.  It is also worse with moving.  Rest makes it better. Aiyana Sutton has not had nausea, vomiting, diarrhea.  No skin color change.  No change in eye color.  No fevers or chills.   She saw her PCP today who was concerned about appendicitis so he sent her to ER,  although she denies any right lower quadrant abdominal pain.  No history of surgery to the abdomen.     was having trouble swallowing food last week, now resolved. \"    10/30/2019   -Throat Culture, RSV by PCR, Strep AG Screen, Respiratory Panel, Flu A&B, Mononucleosis screen, Hep panel, HIV and Bordetella by PCR, CXR - all negative  -EBV AB Panel - positive -[id number] Activity - low  -US Abd: Hepatomegaly and hepatic steatosis. Splenomegaly. EKG: Sinus tachycardia with short ND   Nonspecific T wave abnormality   Prolonged QT   No previous ECGs available     10/31/2019  -NM Hepatobiliary w/ intervention: IMPRESSION:  Normal gallbladder hepatobiliary imaging study.     11/8/2019   -Re-admitted now for right lateral CP pain    -CT Abd/Pelv w/ cont: IMPRESSION: 2.3 cm x 2.3 cm x 1.2 cm peripheral inferior right lower lobe fluid  collection likely to represent a pulmonary abscess. Trace right pleural fluid. Recommend follow-up to complete resolution. -CT Chest w/o cont: IMPRESSION:   1. 2.3 cm x 1.3 cm peripheral right lower lobe focal fluid collection likely to  represent a pulmonary abscess. 2. small right pleural effusion, slightly increased in size. 3. 1.4 cm x 1.8 cm superior segment left lower lobe opacity. 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 I suspect the organism will be Fusobacterium. The lung abscesses will likely respond and resolve with the antibiotic therapy and should not need to be biopsied. She does have a very small right pleural effusion. If this would increase in size, she will need a thoracentesis to rule out an empyema. Her Ambrose-Barr virus serologies last admission were consistent with remote infection but not recent infection. I anticipate at least 4 weeks of antibiotics.  PLAN:  1. Continue Zosyn. I will increase the dose to 4.5 g IV q.8 h. She will need at least 4 weeks of antibiotics. 2.  Discontinue vancomycin. 3.  CT scan of the neck with IV contrast to evaluate for right internal jugular thrombosis. \"     CT Chest and Neck Soft Tissue w/ Contrast 11/10/2019:  \"IMPRESSION:   1. Partial occlusion right internal jugular vein, from just below the skull base  to the lower neck as above. Does not  extend intracranially or into the chest.  2. Mildly enlarged cervical lymph nodes. No evidence of neck abscess.   3. No significant change in 2.2 x 1.2 cm rim-enhancing fluid collection lateral  right lung base most consistent with pulmonary abscess. Associated pleural  effusion. 4. 1.5 x 1.5 cm nodular airspace disease left lower lobe is unchanged.   The findings were called to nurse Russell Ramírez and messaged via Efizity to Dr. Kesha Douglas on 11/10/2019 at 10:20 AM by myself. \"    11/10/2019-Consult for vasc surg; hyper coag studies sent (consult hem/onc if abnormal); Started Heparin gtt; RUE venous duplex ordered    11/11/2019-Per pulm and vasc, will need Eliquis x3 months. Will need f/u CT in 4-6 weeks for pulm. IV abx continue, likely home with IV abx later this week per ID. Subjective:   Patient feels much better now, responded well to abx, mother in room. No fevers, mild cough, pleuritic pain with cough. Assessment/Plan:     Lung abscess:   -Small areas on R and L on CT; seen by pulm 11/9-small abscess vs PNA vs septic emboli  - echo-pending  -+clot RIJ-Heparin gtt started 11/10  -Paired BC 11/9 - NGTD  -UA unremarkable, culture shows mixed urogenital mushtaq  -Encourage PO fluids, IS q1h while awake, TCDB  -Pain significantly improved. -WBC much improved   - ID following, Vanc stopped by ID 11/9- Continue zosyn - likely needs IV abx as OP  -Appreciate pulm - needs repeat Chest CT 4-6 weeks    Partial Occlusion of RIJ on CT 11/10: confirmed by Duplex. -Hyper-coag panel sent and pending - consult hem/onc if abnormal  -Seen by Dr. Clay Morse w/ heparin as IP, Eliquis x3 months as OP    Hepatomegaly and Splenomegaly d/t mononucleosis:  -hepatomegaly resolved on CT 11/8-splenomegaly improved, asymptomatic    VTE prophylaxis:  Heparin gtt  Code status: FULL  Needed for Discharge: IV abx at home.  Will need PICC prior to DC  Emergency Contact(s):  Extended Emergency Contact Information  Primary Emergency Contact: [address] Phone: [phone]  Mobile Phone: [phone]  Relation: Mother        Review of Systems:     Review of Systems:  Symptom  Y/N  Comments   Symptom  Y/N  Comments    Fever/Chills   N   Chest Pain  Y R LATERAL-IMPR   Poor Appetite   N   Edema   N    Cough  N   Abdominal Pain   N    Sputum  N   Joint Pain  N    SOB/ZARCO  N   Pruritis/Rash  N    Nausea/vomit  N   Tolerating PT/OT  Y    Diarrhea  N   Tolerating Diet  Y    Constipation  N   Other      Could not obtain due to:         Objective:   Physical Exam:     Visit Vitals  /69   Pulse 77   Temp 98.2 °F (36.8 °C)   Resp 16   Wt 56 kg (123 lb 6.4 oz)   LMP 10/14/2019 (Approximate)   SpO2 96%   Breastfeeding? No   BMI 22.57 kg/m²      O2 Device: Room air    Temp (24hrs), Av.3 °F (36.8 °C), Min:98 °F (36.7 °C), Max:98.8 °F (37.1 °C)    No intake/output data recorded. 11/10 1901 -  0700  In: 600 [P.O.:600]  Out: -       General:  Alert, cooperative, no distress, appears stated age. +R submand lymph nodes enlarged   Lungs:   CTA, no accessory muscle use   Heart:  Regular rate and rhythm, S1, S2 normal, no murmur, click, rub or gallop. Abdomen:   Soft, non-tender, non-distended. Bowel sounds normal. +splenomegaly - improving   Extremities: Extremities normal, atraumatic, no cyanosis or edema. Skin: Skin color, texture, turgor normal. No rashes or lesions   Neurologic: CNII-XII intact. Data Review:       Recent Days:  Recent Labs     19  0520 19  030   WBC 9.3 10.7 10.7   HGB 8.7* 9.4* 8.9*   HCT 28.1* 30.1* 27.6*   * 613* 530*     Recent Labs     19  0520 19  0307 11/10/19  1345    138 138   K 3.6 3.5 3.2*    105 104   CO2 26 29 29    127* 137*   BUN 8 14 13   CREA 0.74 0.64 0.70   CA 9.3 8.9 8.8   INR  --   --  1.0     No results for input(s): PH, PCO2, PO2, HCO3, FIO2 in the last 72 hours.     24 Hour Results:  Recent Results (from the past 24 hour(s))   PTT    Collection Time: 19  8:48 PM   Result Value Ref Range    aPTT 44.4 (H) 22.1 - 32.0 sec    aPTT, therapeutic range 58.0 - 77.0 SECS   CBC WITH AUTOMATED DIFF    Collection Time: 11/11/19  8:48 PM   Result Value Ref Range    WBC 10.7 3.6 - 11.0 K/uL    RBC 3.33 (L) 3.80 - 5.20 M/uL    HGB 9.4 (L) 11.5 - 16.0 g/dL    HCT 30.1 (L) 35.0 - 47.0 %    MCV 90.4 80.0 - 99.0 FL    MCH 28.2 26.0 - 34.0 PG    MCHC 31.2 30.0 - 36.5 g/dL    RDW 12.7 11.5 - 14.5 %    PLATELET 915 (H) 292 - 400 K/uL    MPV 8.7 (L) 8.9 - 12.9 FL    NRBC 0.0 0  WBC    ABSOLUTE NRBC 0.00 0.00 - 0.01 K/uL    NEUTROPHILS 71 32 - 75 %    LYMPHOCYTES 20 12 - 49 %    MONOCYTES 6 5 - 13 %    EOSINOPHILS 1 0 - 7 %    BASOPHILS 1 0 - 1 %    IMMATURE GRANULOCYTES 1 (H) 0.0 - 0.5 %    ABS. NEUTROPHILS 7.6 1.8 - 8.0 K/UL    ABS. LYMPHOCYTES 2.1 0.8 - 3.5 K/UL    ABS. MONOCYTES 0.7 0.0 - 1.0 K/UL    ABS. EOSINOPHILS 0.1 0.0 - 0.4 K/UL    ABS. BASOPHILS 0.1 0.0 - 0.1 K/UL    ABS. IMM. GRANS. 0.1 (H) 0.00 - 0.04 K/UL    DF AUTOMATED     CBC WITH AUTOMATED DIFF    Collection Time: 11/12/19  5:20 AM   Result Value Ref Range    WBC 9.3 3.6 - 11.0 K/uL    RBC 3.14 (L) 3.80 - 5.20 M/uL    HGB 8.7 (L) 11.5 - 16.0 g/dL    HCT 28.1 (L) 35.0 - 47.0 %    MCV 89.5 80.0 - 99.0 FL    MCH 27.7 26.0 - 34.0 PG    MCHC 31.0 30.0 - 36.5 g/dL    RDW 12.7 11.5 - 14.5 %    PLATELET 122 (H) 091 - 400 K/uL    MPV 8.7 (L) 8.9 - 12.9 FL    NRBC 0.0 0  WBC    ABSOLUTE NRBC 0.00 0.00 - 0.01 K/uL    NEUTROPHILS 61 32 - 75 %    LYMPHOCYTES 29 12 - 49 %    MONOCYTES 7 5 - 13 %    EOSINOPHILS 2 0 - 7 %    BASOPHILS 1 0 - 1 %    IMMATURE GRANULOCYTES 0 0.0 - 0.5 %    ABS. NEUTROPHILS 5.8 1.8 - 8.0 K/UL    ABS. LYMPHOCYTES 2.7 0.8 - 3.5 K/UL    ABS. MONOCYTES 0.6 0.0 - 1.0 K/UL    ABS. EOSINOPHILS 0.2 0.0 - 0.4 K/UL    ABS. BASOPHILS 0.1 0.0 - 0.1 K/UL    ABS. IMM.  GRANS. 0.0 0.00 - 0.04 K/UL    DF AUTOMATED     METABOLIC PANEL, BASIC    Collection Time: 11/12/19  5:20 AM   Result Value Ref Range    Sodium 138 136 - 145 mmol/L    Potassium 3.6 3.5 - 5.1 mmol/L    Chloride 106 97 - 108 mmol/L CO2 26 21 - 32 mmol/L    Anion gap 6 5 - 15 mmol/L    Glucose 100 65 - 100 mg/dL    BUN 8 6 - 20 MG/DL    Creatinine 0.74 0.55 - 1.02 MG/DL    BUN/Creatinine ratio 11 (L) 12 - 20      GFR est AA >60 >60 ml/min/1.73m2    GFR est non-AA >60 >60 ml/min/1.73m2    Calcium 9.3 8.5 - 10.1 MG/DL   PTT    Collection Time: 11/12/19  5:20 AM   Result Value Ref Range    aPTT 77.4 (H) 22.1 - 32.0 sec    aPTT, therapeutic range     58.0 - 77.0 SECS   ECHO ADULT COMPLETE    Collection Time: 11/12/19  9:58 AM   Result Value Ref Range    LA Volume 30.02 22 - 52 mL    LV E' Lateral Velocity 14.31 cm/s    LV E' Septal Velocity 11.45 cm/s    Tapse 2.22 (A) 1.5 - 2.0 cm    Ao Root D 2.63 cm    Aortic Valve Systolic Peak Velocity 513.68 cm/s    Aortic Valve Area by Continuity of Peak Velocity 2.7 cm2    AoV PG 6.0 mmHg    LVIDd 5.23 3.9 - 5.3 cm    LVPWd 0.74 0.6 - 0.9 cm    LVIDs 3.23 cm    IVSd 0.85 0.6 - 0.9 cm    LVOT d 2.06 cm    LVOT Peak Velocity 97.87 cm/s    LVOT Peak Gradient 3.8 mmHg    MVA (PHT) 3.9 cm2    MV A David 44.42 cm/s    MV E David 76.21 cm/s    MV E/A 1.70     Left Atrium to Aortic Root Ratio 1.22     RVIDd 4.37 cm    LA Vol 4C 25.59 22 - 52 mL    LA Vol 2C 26.39 22 - 52 mL    LA Area 4C 12.5 cm2    LV Mass .0 (A) 67 - 162 g    LV Mass AL Index 106.6 43 - 95 g/m2    E/E' lateral 5.33     E/E' septal 6.66     RVSP 24.9 mmHg    E/E' ratio (averaged) 5.99     Est. RA Pressure 5.0 mmHg    Mitral Valve E Wave Deceleration Time 192.1 ms    Mitral Valve Pressure Half-time 55.7 ms    Left Atrium Major Axis 3.21 cm    Triscuspid Valve Regurgitation Peak Gradient 19.9 mmHg    Pulmonic Valve Max Velocity 108.44 cm/s    TR Max Velocity 222.96 cm/s    LA Vol Index 19.28 16 - 28 ml/m2    PASP 24.9 mmHg    LA Vol Index 16.95 16 - 28 ml/m2    LA Vol Index 16.44 16 - 28 ml/m2    Left Ventricular Fractional Shortening by 2D [phone] %    Mitral Valve Deceleration [place] [phone]     AV Velocity Ratio 0.80     PV peak gradient 4.7 mmHg       Problem List:  Problem List as of 11/12/2019 Date Reviewed: 11/8/2019          Codes Class Noted - Resolved    Lung abscess ([facility] ICD-10-CM: J85.2  ICD-9-CM: 513.0  11/8/2019 - Present        SIRS (systemic inflammatory response syndrome) ([place] [address].) ICD-10-CM: R65.10  ICD-9-CM: 995.90  10/30/2019 - Present        Prolonged QT interval ICD-10-CM: R94.31  ICD-9-CM: 794.31  10/30/2019 - Present        GISELLA (acute kidney injury) ([facility] ICD-10-CM: N17.9  ICD-9-CM: 584.9  10/30/2019 - Present        Thrombocytopenia ([facility] ICD-10-CM: D69.6  ICD-9-CM: 287.5  10/30/2019 - Present        Hypokalemia ICD-10-CM: E87.6  ICD-9-CM: 276.8  10/30/2019 - Present        Hypophosphatemia ICD-10-CM: E83.39  ICD-9-CM: 275.3  10/30/2019 - Present        Hyponatremia ICD-10-CM: E87.1  ICD-9-CM: 276.1  10/30/2019 - Present              Medications reviewed  Current Facility-Administered Medications   Medication Dose Route Frequency    docusate sodium (COLACE) capsule 100 mg  100 mg Oral DAILY    bisacodyl (DULCOLAX) suppository 10 mg  10 mg Rectal DAILY PRN    polyethylene glycol (MIRALAX) packet 17 g  17 g Oral DAILY    piperacillin-tazobactam (ZOSYN) 4.5 g in 0.9% sodium chloride (MBP/ADV) 100 mL  4.5 g IntraVENous Q8H    heparin 25,000 units in D5W 250 ml infusion  18-36 Units/kg/hr IntraVENous TITRATE    morphine injection 2 mg  2 mg IntraVENous Q4H PRN    sodium chloride (NS) flush 5-40 mL  5-40 mL IntraVENous Q8H    sodium chloride (NS) flush 5-40 mL  5-40 mL IntraVENous PRN    naloxone (NARCAN) injection 0.4 mg  0.4 mg IntraVENous PRN    diphenhydrAMINE (BENADRYL) capsule 25 mg  25 mg Oral Q4H PRN    ondansetron (ZOFRAN) injection 4 mg  4 mg IntraVENous Q4H PRN    acetaminophen (TYLENOL) tablet 650 mg  650 mg Oral Q4H PRN    HYDROcodone-acetaminophen (NORCO) 5-325 mg per tablet 1 Tab  1 Tab Oral Q4H PRN    ketorolac (TORADOL) injection 30 mg  30 mg IntraVENous Q6H PRN       Care Plan discussed with: Patient, mother, RN, Pulm NP  Total time spent with patient: 30 minutes.     Chel Perez NP  Hospitalist  Providers can reach me directly at [phone] or [facility]

## 2019-11-12 NOTE — PROGRESS NOTES
Pulmonary, Critical Care, and Sleep Medicine~Progress Note    Name: Jimbo Lopes MRN: [id number]   : 1995 Hospital: [facility] [address]    Date: 2019 11:22 AM Admission: 2019     IMPRESSION:   · Abnormal CT chest: small right pleural effusion, peripheral right lower lobe 2.3 cm x 1.2 cm focal fluid collection, left lower lobe 1.4 cm x 1.8 cm mass like opacity; felt to be lemierre's syndrome with Septic emboli  · Right IJ DVT, first offense. HD stable  · Recent admission for presumed EBV  · Blood cultures 10/29/19 w/ GNR  bottles      PLAN:   · Eliquis for three months; on heparin gtt now  · hypercoag work up in progress   · Will require repeat CT in 4 - 6 weeks and follow up ultrasound and chest x-ray in 1 wk  · O2 titration above 92%  · Reviewed the chest x-ray with her which did not show any worsening of the pleural effusion, though it is still evident. Clinically she is responding to abx and at this point the benefits of a thoracentesis do not appear to outweigh the risks, especially since she is on heparin. Will continue to monitor the effusion, and if she spikes fevers, or leukocytosis ensues. She was in agreement with the plan     Daily Progression:      Clinically feeling better       Feeling better today. Less pain. Pleasant       HPI:  31yo female nonsmoker w/out sig pmhx admitted 19 w/ right sided pleuritic cp. Recently admitted to this facility 10/30 - 19 w/ acute illness. Treated for acute EBV. CXR 10/30 clear. BC done while inpt  GNR. Pain began about 2 days ago. Worse w/ deep inspiration. No fevers. Very minimal nonproductive cough. Mild dyspnea. No wheezing. Presented to PCP , noted RUQ abdominal tenderness, referred to ED. CT abdomen w/ small right pleural effusion, 2.3 cm x 2.3 cm x 1.3 cm focal fluid collection.  CT chest w/ small right pleural effusion, peripheral right lower lobe 2.3 cm x 1.2 cm focal fluid collection, left lower lobe 1.4 cm x 1.8 cm mass like opacity. Placed on empiric abx. Sitting up in bed. Mother at bedside. Pain some improved, worse w/ deep breathing. Tmax 100.6. WBC 22. Oxygenating well on RA. Nonsmoker w/out hx of asthma. No family hx of lung disease. Cough nonproductive. Denies drug use. PMHX: negative    Surgical HX: none    Social HX: denies drug use, nonsmoker    Family HX: noncontributory    Home medications: birth control      I have reviewed the labs and previous days notes. A comprehensive review of systems was negative except for that written in the HPI. History reviewed. No pertinent past medical history. History reviewed. No pertinent surgical history. Prior to Admission medications    Medication Sig Start Date End Date Taking? Authorizing Provider   acetaminophen (TYLENOL) 325 mg tablet Take 325 mg by mouth every four (4) hours as needed for Pain. Yes Provider, Historical   norethindrone-ethinyl estradiol (JUNEL FE 1/20, 28,) 1 mg-20 mcg (21)/75 mg (7) tab Take 1 Tab by mouth daily. Yes Provider, Historical   ibuprofen (ADVIL) 200 mg tablet Take 200 mg by mouth every six (6) hours as needed for Pain. Yes Provider, Historical     Allergies   Allergen Reactions    Sulfa (Sulfonamide Antibiotics) Hives     19 \"Extreme hives\" per Mother      Social History     Tobacco Use    Smoking status: Never Smoker    Smokeless tobacco: Never Used   Substance Use Topics    Alcohol use: Never     Frequency: Never      Family History   Problem Relation Age of Onset    Stroke Maternal Grandfather     Cancer Neg Hx     Diabetes Neg Hx     Heart Disease Neg Hx        OBJECTIVE:     Vital Signs:       Visit Vitals  /69   Pulse 77   Temp 98.2 °F (36.8 °C)   Resp 16   Wt 56 kg (123 lb 6.4 oz)   LMP 10/14/2019 (Approximate)   SpO2 96%   Breastfeeding?  No   BMI 22.57 kg/m²      Temp (24hrs), Av.3 °F (36.8 °C), Min:98 °F (36.7 °C), Max:98.8 °F (37.1 °C)     Intake/Output:     Last shift: No intake/output data recorded.     Last 3 shifts: 11/10 1901 - 11/12 0700  In: 600 [P.O.:600]  Out: -         No intake or output data in the 24 hours ending 11/12/19 1031      Imaging:  I have personally reviewed these radiographic films and reports:     I have personally reviewed PFTs:          Ventilation:   Mode Rate Tidal Volume Pressure Suppport FiO2/LPM PEEP Other               Peak airway pressure:      Minute ventilation:      Trilogy:        Physical Exam:                                        Exam Findings Other   General: No resp distress noted, appears stated age    HEENT:  No ulcers, JVD not elevated, no cervical LAD    Chest: No pectus deformity, normal chest rise b/l    HEART:  RRR, no murmurs/rubs/gallops    Lungs:  CTA b/l, no rhonchi/crackles/wheeze, diminished BS right base    ABD: Soft/NT, non rigid mildly distended    EXT: No cyanosis/clubbing/edema, normal peripheral pulses    Skin: No rashes or ulcers, no mottling    Neuro:  A/O x 3        Medications:  Current Facility-Administered Medications   Medication Dose Route Frequency    docusate sodium (COLACE) capsule 100 mg  100 mg Oral DAILY    bisacodyl (DULCOLAX) suppository 10 mg  10 mg Rectal DAILY PRN    polyethylene glycol (MIRALAX) packet 17 g  17 g Oral DAILY    piperacillin-tazobactam (ZOSYN) 4.5 g in 0.9% sodium chloride (MBP/ADV) 100 mL  4.5 g IntraVENous Q8H    heparin 25,000 units in D5W 250 ml infusion  18-36 Units/kg/hr IntraVENous TITRATE    morphine injection 2 mg  2 mg IntraVENous Q4H PRN    sodium chloride (NS) flush 5-40 mL  5-40 mL IntraVENous Q8H    sodium chloride (NS) flush 5-40 mL  5-40 mL IntraVENous PRN    naloxone (NARCAN) injection 0.4 mg  0.4 mg IntraVENous PRN    diphenhydrAMINE (BENADRYL) capsule 25 mg  25 mg Oral Q4H PRN    ondansetron (ZOFRAN) injection 4 mg  4 mg IntraVENous Q4H PRN    acetaminophen (TYLENOL) tablet 650 mg  650 mg Oral Q4H PRN    HYDROcodone-acetaminophen (NORCO) 5-325 mg per tablet 1 Tab  1 Tab Oral Q4H PRN    ketorolac (TORADOL) injection 30 mg  30 mg IntraVENous Q6H PRN       Labs:  ABG No results for input(s): PHI, PCO2I, PO2I, HCO3I, SO2I, FIO2I in the last 72 hours.      CBC Recent Labs     11/12/19  0520 11/11/19  2048 11/11/19  0307   WBC 9.3 10.7 10.7   HGB 8.7* 9.4* 8.9*   HCT 28.1* 30.1* 27.6*   * 613* 530*   MCV 89.5 90.4 89.0   MCH 27.7 28.2 29.9        Metabolic  Panel Recent Labs     11/12/19  0520 11/11/19  0307 11/10/19  1345    138 138   K 3.6 3.5 3.2*    105 104   CO2 26 29 29    127* 137*   BUN 8 14 13   CREA 0.74 0.64 0.70   CA 9.3 8.9 8.8   INR  --   --  1.0        Pertinent Labs                Leonel Swartz PA-C  11/12/2019

## 2019-11-12 NOTE — PROGRESS NOTES
A Spiritual Care Partner Volunteer visited patient in  501 on 11/12/2019.   Documented by:  Chaplain Gruber MDiv, MS, [address] [place] [address] ([id number])

## 2019-11-12 NOTE — PROGRESS NOTES
Infectious Diseases Progress Note    Antibiotic Summary:  Vancomycin  --   Zosyn   -- present      Subjective:     Minimal residual CP at base of right lung with sneeze or cough    ROS:  Constitutional: no fever or rigors  HEENT: no HA or change in vision   Skin: no pruritis  Resp: no SOB, hemoptysis, ZARCO -- ambulated in flores without difficulty  CV: no symptoms of CHF  GI: no N, V, D. She was constipated and had BRB per rectum after straining for BM (on heparin)  MS: no joint complaints  Neuro: no neuro symptoms    Objective:     Vitals:   Visit Vitals  /90   Pulse 81   Temp 98 °F (36.7 °C)   Resp 18   Wt 56 kg (123 lb 6.4 oz)   LMP 10/14/2019 (Approximate)   SpO2 97%   Breastfeeding? No   BMI 22.57 kg/m²        Tmax:  Temp (24hrs), Av.1 °F (36.7 °C), Min:98 °F (36.7 °C), Max:98.2 °F (36.8 °C)      Exam:  General appearance: alert, no distress  Eyes: sclera and conj benign  Neck: thrombosed RIJ palpable  Lungs: clear to auscultation bilaterally with decreased BS right base  Heart: regular rate and rhythm  Abdomen: soft, non-tender. Bowel sounds normal. No masses,  no organomegaly  Extremities: no edema  Skin: no rash  Neurologic: A&O    IV Lines: peripheral    Labs:    Recent Labs     19  2048 19  0307 11/10/19  1345 11/10/19  0413 19  0306   WBC 10.7 10.7 14.0*  --   --    HGB 9.4* 8.9* 9.7*  --   --    * 530* 531*  --   --    BUN  --  14 13 13 17   CREA  --  0.64 0.70 0.63 0.75   BLOOD CULTURES:   10/29 = Fusobacterium necrophorum in 2 of 4 bottles    = NGSF    = pending    Assessment:     1. 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 The lung abscesses will likely respond to antibiotics and should not need biopsy. She has a very small right pleural effusion.  If this increases in size, she will need a thoracentesis to R/O empyema. She appears to be responding very well to antibiotics. Plan:     1.  Continue Zosyn      Discussed at length with the patient and her mother    Melvin Flores MD

## 2019-11-13 LAB
ANION GAP SERPL CALC-SCNC: 6 MMOL/L (ref 5–15)
APTT PPP: 70.6 SEC (ref 22.1–32)
AT III PPP CHRO-ACNC: 113 % (ref 75–135)
AV VELOCITY RATIO: 0.8
BASOPHILS # BLD: 0.1 K/UL (ref 0–0.1)
BASOPHILS NFR BLD: 1 % (ref 0–1)
BUN SERPL-MCNC: 7 MG/DL (ref 6–20)
BUN/CREAT SERPL: 8 (ref 12–20)
CALCIUM SERPL-MCNC: 9.6 MG/DL (ref 8.5–10.1)
CHLORIDE SERPL-SCNC: 104 MMOL/L (ref 97–108)
CO2 SERPL-SCNC: 26 MMOL/L (ref 21–32)
CREAT SERPL-MCNC: 0.85 MG/DL (ref 0.55–1.02)
DIFFERENTIAL METHOD BLD: ABNORMAL
ECHO AO ROOT DIAM: 2.63 CM
ECHO AV AREA PEAK VELOCITY: 2.7 CM2
ECHO AV PEAK GRADIENT: 6 MMHG
ECHO AV PEAK VELOCITY: 122.92 CM/S
ECHO EST RA PRESSURE: 5 MMHG
ECHO LA AREA 4C: 12.5 CM2
ECHO LA MAJOR AXIS: 3.21 CM
ECHO LA TO AORTIC ROOT RATIO: 1.22
ECHO LA VOL 2C: 26.39 ML (ref 22–52)
ECHO LA VOL 4C: 25.59 ML (ref 22–52)
ECHO LA VOL BP: 30.02 ML (ref 22–52)
ECHO LA VOL/BSA BIPLANE: 19.28 ML/M2 (ref 16–28)
ECHO LA VOLUME INDEX A2C: 16.95 ML/M2 (ref 16–28)
ECHO LA VOLUME INDEX A4C: 16.44 ML/M2 (ref 16–28)
ECHO LV E' LATERAL VELOCITY: 14.31 CM/S
ECHO LV E' SEPTAL VELOCITY: 11.45 CM/S
ECHO LV INTERNAL DIMENSION DIASTOLIC: 5.23 CM (ref 3.9–5.3)
ECHO LV INTERNAL DIMENSION SYSTOLIC: 3.23 CM
ECHO LV IVSD: 0.85 CM (ref 0.6–0.9)
ECHO LV MASS 2D: 166 G (ref 67–162)
ECHO LV MASS INDEX 2D: 106.6 G/M2 (ref 43–95)
ECHO LV POSTERIOR WALL DIASTOLIC: 0.74 CM (ref 0.6–0.9)
ECHO LVOT DIAM: 2.06 CM
ECHO LVOT PEAK GRADIENT: 3.8 MMHG
ECHO LVOT PEAK VELOCITY: 97.87 CM/S
ECHO MV A VELOCITY: 44.42 CM/S
ECHO MV AREA PHT: 3.9 CM2
ECHO MV E DECELERATION TIME (DT): 192.1 MS
ECHO MV E VELOCITY: 76.21 CM/S
ECHO MV E/A RATIO: 1.72
ECHO MV E/E' LATERAL: 5.33
ECHO MV E/E' RATIO (AVERAGED): 5.99
ECHO MV E/E' SEPTAL: 6.66
ECHO MV PRESSURE HALF TIME (PHT): 55.7 MS
ECHO PULMONARY ARTERY SYSTOLIC PRESSURE (PASP): 24.9 MMHG
ECHO PV MAX VELOCITY: 108.44 CM/S
ECHO PV PEAK GRADIENT: 4.7 MMHG
ECHO RIGHT VENTRICULAR SYSTOLIC PRESSURE (RVSP): 24.9 MMHG
ECHO RV INTERNAL DIMENSION: 4.37 CM
ECHO RV TAPSE: 2.22 CM (ref 1.5–2)
ECHO TV REGURGITANT MAX VELOCITY: 222.96 CM/S
ECHO TV REGURGITANT PEAK GRADIENT: 19.9 MMHG
EOSINOPHIL # BLD: 0.2 K/UL (ref 0–0.4)
EOSINOPHIL NFR BLD: 2 % (ref 0–7)
ERYTHROCYTE [DISTWIDTH] IN BLOOD BY AUTOMATED COUNT: 12.4 % (ref 11.5–14.5)
GLUCOSE SERPL-MCNC: 98 MG/DL (ref 65–100)
HCT VFR BLD AUTO: 29 % (ref 35–47)
HGB BLD-MCNC: 9.5 G/DL (ref 11.5–16)
IMM GRANULOCYTES # BLD AUTO: 0.1 K/UL (ref 0–0.04)
IMM GRANULOCYTES NFR BLD AUTO: 1 % (ref 0–0.5)
LVFS 2D: 38.11 %
LYMPHOCYTES # BLD: 2.9 K/UL (ref 0.8–3.5)
LYMPHOCYTES NFR BLD: 34 % (ref 12–49)
MCH RBC QN AUTO: 28.9 PG (ref 26–34)
MCHC RBC AUTO-ENTMCNC: 32.8 G/DL (ref 30–36.5)
MCV RBC AUTO: 88.1 FL (ref 80–99)
MONOCYTES # BLD: 0.6 K/UL (ref 0–1)
MONOCYTES NFR BLD: 7 % (ref 5–13)
MV DEC SLOPE: 3.97
NEUTS SEG # BLD: 4.8 K/UL (ref 1.8–8)
NEUTS SEG NFR BLD: 55 % (ref 32–75)
NRBC # BLD: 0 K/UL (ref 0–0.01)
NRBC BLD-RTO: 0 PER 100 WBC
PLATELET # BLD AUTO: 587 K/UL (ref 150–400)
PMV BLD AUTO: 8.6 FL (ref 8.9–12.9)
POTASSIUM SERPL-SCNC: 3.8 MMOL/L (ref 3.5–5.1)
PROT C PPP-ACNC: 103 % (ref 73–180)
PROT S ACT/NOR PPP: 104 % (ref 57–157)
PROT S PPP-ACNC: 90 % (ref 60–150)
RBC # BLD AUTO: 3.29 M/UL (ref 3.8–5.2)
SODIUM SERPL-SCNC: 136 MMOL/L (ref 136–145)
THERAPEUTIC RANGE,PTTT: ABNORMAL SECS (ref 58–77)
WBC # BLD AUTO: 8.6 K/UL (ref 3.6–11)

## 2019-11-13 PROCEDURE — 74011250636 HC RX REV CODE- 250/636: Performed by: INTERNAL MEDICINE

## 2019-11-13 PROCEDURE — 74011000258 HC RX REV CODE- 258: Performed by: INTERNAL MEDICINE

## 2019-11-13 PROCEDURE — 80048 BASIC METABOLIC PNL TOTAL CA: CPT

## 2019-11-13 PROCEDURE — 36415 COLL VENOUS BLD VENIPUNCTURE: CPT

## 2019-11-13 PROCEDURE — 74011250636 HC RX REV CODE- 250/636: Performed by: NURSE PRACTITIONER

## 2019-11-13 PROCEDURE — 65270000029 HC RM PRIVATE

## 2019-11-13 PROCEDURE — 85730 THROMBOPLASTIN TIME PARTIAL: CPT

## 2019-11-13 PROCEDURE — 85025 COMPLETE CBC W/AUTO DIFF WBC: CPT

## 2019-11-13 RX ADMIN — PIPERACILLIN, TAZOBACTAM 4.5 G: 4; .5 INJECTION, POWDER, LYOPHILIZED, FOR SOLUTION INTRAVENOUS at 07:30

## 2019-11-13 RX ADMIN — Medication 10 ML: at 16:50

## 2019-11-13 RX ADMIN — PIPERACILLIN, TAZOBACTAM 4.5 G: 4; .5 INJECTION, POWDER, LYOPHILIZED, FOR SOLUTION INTRAVENOUS at 16:50

## 2019-11-13 RX ADMIN — HEPARIN SODIUM 26 UNITS/KG/HR: 10000 INJECTION, SOLUTION INTRAVENOUS at 07:56

## 2019-11-13 NOTE — ROUTINE PROCESS
Bedside and Verbal shift change report given to Andrew Molina RN (oncoming nurse) by María Elena Segundo RN (offgoing nurse). Report included the following information SBAR, Kardex, Intake/Output, MAR and Recent Results.

## 2019-11-13 NOTE — ROUTINE PROCESS
Bedside and Verbal shift change report given to [address] (oncoming nurse) by Jeana Armendariz RN (offgoing nurse). Report included the following information SBAR, Kardex, Intake/Output, MAR and Recent Results.

## 2019-11-13 NOTE — PROGRESS NOTES
Infectious Diseases Progress Note    Antibiotic Summary:  Vancomycin  --   Zosyn   -- present      Subjective:     Minimal residual CP at base of right lung with sneeze or cough    ROS:  Constitutional: no fever or rigors  HEENT: no HA or change in vision   Skin: no pruritis  Resp: no SOB, hemoptysis, ZARCO -- ambulated in flores without difficulty  CV: no symptoms of CHF  GI: no N, V, D. Constipation resolved and no further rectal bleeding  MS: no joint complaints  Neuro: no neuro symptoms    Objective:     Vitals:   Visit Vitals  /73   Pulse 86   Temp 98.1 °F (36.7 °C)   Resp 16   Wt 56 kg (123 lb 6.4 oz)   LMP 10/14/2019 (Approximate)   SpO2 96%   Breastfeeding? No   BMI 22.57 kg/m²        Tmax:  Temp (24hrs), Av.4 °F (36.9 °C), Min:98.1 °F (36.7 °C), Max:98.8 °F (37.1 °C)      Exam:  General appearance: alert, no distress  Eyes: sclera and conj benign  Neck: thrombosed RIJ palpable  Lungs: clear to auscultation bilaterally with decreased BS right base; no pleural heard  Heart: regular rate and rhythm  Abdomen: soft, non-tender. Bowel sounds normal. 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 She has a very small right pleural effusion.  If this increases in size, she will need a thoracentesis to R/O empyema. She appears to be responding very well to antibiotics. The bacteremia has cleared    Plan:     1.  Continue Zosyn      Discussed at length with the patient and her parents    Will Puri MD

## 2019-11-13 NOTE — PROGRESS NOTES
Hospitalist Progress Note          Alma Carrasco NP  Please call  and page for questions. Call physician on-call through the  7pm-7am    Daily Progress Note: 11/13/2019    Primary care provider:Dinesh Casiano MD    Date of admission: 11/8/2019  3:14 PM    Admission Summary and Hospital Course:    From H&P 11/8/19:  Zayra Velazquez is a 30 y.o. female who presents with right lower chest pain    Patient is a 66-year-old female recently admitted for mononucleosis from 10/20 to 11/2. At that time patient was having rt cervicl/submad adenopahty, severe sore throat,  and hepatosplenomegaly. Since discharge, her above symptoms are much improved, no longer have sore throat. She developed severe right lower chest and right upper quadrant yesterday,  abdominal pain. described as sharp, stabbing pain that radiates to the right shoulder, severe, 10/10.  It is worse when she coughs or breathes.  It is also worse with moving.  Rest makes it better. Juan Jose Amaya has not had nausea, vomiting, diarrhea.  No skin color change.  No change in eye color.  No fevers or chills.   She saw her PCP today who was concerned about appendicitis so he sent her to ER,  although she denies any right lower quadrant abdominal pain.  No history of surgery to the abdomen.     was having trouble swallowing food last week, now resolved. \"    10/30/2019   -Throat Culture, RSV by PCR, Strep AG Screen, Respiratory Panel, Flu A&B, Mononucleosis screen, Hep panel, HIV and Bordetella by PCR, CXR - all negative  -EBV AB Panel - positive -[id number] Activity - low  -US Abd: Hepatomegaly and hepatic steatosis. Splenomegaly. EKG: Sinus tachycardia with short UT   Nonspecific T wave abnormality   Prolonged QT   No previous ECGs available     10/31/2019  -NM Hepatobiliary w/ intervention: IMPRESSION:  Normal gallbladder hepatobiliary imaging study.     11/8/2019   -Re-admitted now for right lateral CP pain    -CT Abd/Pelv w/ cont: IMPRESSION: 2.3 cm x 2.3 cm x 1.2 cm peripheral inferior right lower lobe fluid  collection likely to represent a pulmonary abscess. Trace right pleural fluid. Recommend follow-up to complete resolution. -CT Chest w/o cont: IMPRESSION:   1. 2.3 cm x 1.3 cm peripheral right lower lobe focal fluid collection likely to  represent a pulmonary abscess. 2. small right pleural effusion, slightly increased in size. 3. 1.4 cm x 1.8 cm superior segment left lower lobe opacity. 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 I suspect the organism will be Fusobacterium. The lung abscesses will likely respond and resolve with the antibiotic therapy and should not need to be biopsied. She does have a very small right pleural effusion. If this would increase in size, she will need a thoracentesis to rule out an empyema. Her Ambrose-Barr virus serologies last admission were consistent with remote infection but not recent infection. I anticipate at least 4 weeks of antibiotics.  PLAN:  1. Continue Zosyn. I will increase the dose to 4.5 g IV q.8 h. She will need at least 4 weeks of antibiotics. 2.  Discontinue vancomycin. 3.  CT scan of the neck with IV contrast to evaluate for right internal jugular thrombosis. \"     CT Chest and Neck Soft Tissue w/ Contrast 11/10/2019:  \"IMPRESSION:   1. Partial occlusion right internal jugular vein, from just below the skull base  to the lower neck as above. Does not  extend intracranially or into the chest.  2. Mildly enlarged cervical lymph nodes. No evidence of neck abscess.   3. No significant change in 2.2 x 1.2 cm rim-enhancing fluid collection lateral  right lung base most consistent with pulmonary abscess. Associated pleural  effusion. 4. 1.5 x 1.5 cm nodular airspace disease left lower lobe is unchanged.   The findings were called to nurse Nerissa Llanes and messaged via [employer] to Dr. Lorena Rodríguez on 11/10/2019 at 10:20 AM by myself. \"    11/10/2019-Consult for vasc surg; hyper coag studies sent (consult hem/onc if abnormal); Started Heparin gtt; RUE venous duplex ordered    11/11/2019-Per pulm and vasc, will need Eliquis x3 months. Will need f/u CT in 4-6 weeks for pulm. IV abx continue, likely home with IV abx later this week per ID. Subjective:   Patient feels well, no acute events, Pleural effusion same size, continue same treatment. Assessment/Plan:     Lung abscess:   -Small areas on R and L on CT; seen by pulm 11/9-small abscess vs PNA vs septic emboli  - echo-pending  -+clot RIJ-Heparin gtt started 11/10  -Paired BC 11/9 - NGTD  -UA unremarkable, culture shows mixed urogenital mushtaq  -Encourage PO fluids, IS q1h while awake, TCDB  -Pain significantly improved. -WBC much improved   - ID following, Vanc stopped by ID 11/9- Continue zosyn - likely needs IV abx as OP  -Appreciate pulm - needs repeat Chest CT 4-6 weeks    Partial Occlusion of RIJ on CT 11/10: confirmed by Duplex. -Hyper-coag panel sent and pending - consult hem/onc if abnormal  -Seen by Dr. Albert Johnson w/ heparin as IP, Eliquis x3 months as OP    Hepatomegaly and Splenomegaly d/t mononucleosis:  -hepatomegaly resolved on CT 11/8-splenomegaly improved, asymptomatic    VTE prophylaxis:  Heparin gtt  Code status: FULL  Needed for Discharge: IV abx at home.  Will need PICC prior to DC  Emergency Contact(s):  Extended Emergency Contact Information  Primary Emergency Contact: [address] Phone: [phone]  Mobile Phone: [phone]  Relation: Mother        Review of Systems:     Review of Systems:  Symptom  Y/N  Comments Symptom  Y/N  Comments    Fever/Chills   N   Chest Pain  Y R LATERAL-IMPR   Poor Appetite   N   Edema   N    Cough  N   Abdominal Pain   N    Sputum  N   Joint Pain  N    SOB/ZARCO  N   Pruritis/Rash  N    Nausea/vomit  N   Tolerating PT/OT  Y    Diarrhea  N   Tolerating Diet  Y    Constipation  N   Other      Could not obtain due to:         Objective:   Physical Exam:     Visit Vitals  /79 (BP 1 Location: Left arm, BP Patient Position: At rest)   Pulse 74   Temp 98.2 °F (36.8 °C)   Resp 16   Wt 56 kg (123 lb 6.4 oz)   LMP 10/14/2019 (Approximate)   SpO2 97%   Breastfeeding? No   BMI 22.57 kg/m²      O2 Device: Room air    Temp (24hrs), Av.3 °F (36.8 °C), Min:98.1 °F (36.7 °C), Max:98.5 °F (36.9 °C)    No intake/output data recorded. No intake/output data recorded. General:  Alert, cooperative, no distress, appears stated age   Lungs:    no accessory muscle use, reduced AE on R base   Heart:  Regular rate and rhythm, S1, S2 normal,   Abdomen:   Soft, non-tender, non-distended. Bowel sounds normal.   Extremities: Extremities normal, atraumatic, no cyanosis or edema. Skin: Skin color, texture, turgor normal. No rashes or lesions   Neurologic: CNII-XII intact. Data Review:       Recent Days:  Recent Labs     19  0520 19  2048   WBC 8.6 9.3 10.7   HGB 9.5* 8.7* 9.4*   HCT 29.0* 28.1* 30.1*   * 564* 613*     Recent Labs     19  0425 19  0520 19  0307 11/10/19  1345    138 138 138   K 3.8 3.6 3.5 3.2*    106 105 104   CO2 26 26 29 29   GLU 98 100 127* 137*   BUN 7 8 14 13   CREA 0.85 0.74 0.64 0.70   CA 9.6 9.3 8.9 8.8   INR  --   --   --  1.0     No results for input(s): PH, PCO2, PO2, HCO3, FIO2 in the last 72 hours.     24 Hour Results:  Recent Results (from the past 24 hour(s))   PTT    Collection Time: 19 11:30 AM   Result Value Ref Range    aPTT 64.1 (H) 22.1 - 32.0 sec    aPTT, therapeutic range     58.0 - 77.0 SECS   CBC WITH AUTOMATED DIFF    Collection Time: 11/13/19  4:25 AM   Result Value Ref Range    WBC 8.6 3.6 - 11.0 K/uL    RBC 3.29 (L) 3.80 - 5.20 M/uL    HGB 9.5 (L) 11.5 - 16.0 g/dL    HCT 29.0 (L) 35.0 - 47.0 %    MCV 88.1 80.0 - 99.0 FL    MCH 28.9 26.0 - 34.0 PG    MCHC 32.8 30.0 - 36.5 g/dL    RDW 12.4 11.5 - 14.5 %    PLATELET 949 (H) 306 - 400 K/uL    MPV 8.6 (L) 8.9 - 12.9 FL    NRBC 0.0 0  WBC    ABSOLUTE NRBC 0.00 0.00 - 0.01 K/uL    NEUTROPHILS 55 32 - 75 %    LYMPHOCYTES 34 12 - 49 %    MONOCYTES 7 5 - 13 %    EOSINOPHILS 2 0 - 7 %    BASOPHILS 1 0 - 1 %    IMMATURE GRANULOCYTES 1 (H) 0.0 - 0.5 %    ABS. NEUTROPHILS 4.8 1.8 - 8.0 K/UL    ABS. LYMPHOCYTES 2.9 0.8 - 3.5 K/UL    ABS. MONOCYTES 0.6 0.0 - 1.0 K/UL    ABS. EOSINOPHILS 0.2 0.0 - 0.4 K/UL    ABS. BASOPHILS 0.1 0.0 - 0.1 K/UL    ABS. IMM.  GRANS. 0.1 (H) 0.00 - 0.04 K/UL    DF AUTOMATED     METABOLIC PANEL, BASIC    Collection Time: 11/13/19  4:25 AM   Result Value Ref Range    Sodium 136 136 - 145 mmol/L    Potassium 3.8 3.5 - 5.1 mmol/L    Chloride 104 97 - 108 mmol/L    CO2 26 21 - 32 mmol/L    Anion gap 6 5 - 15 mmol/L    Glucose 98 65 - 100 mg/dL    BUN 7 6 - 20 MG/DL    Creatinine 0.85 0.55 - 1.02 MG/DL    BUN/Creatinine ratio 8 (L) 12 - 20      GFR est AA >60 >60 ml/min/1.73m2    GFR est non-AA >60 >60 ml/min/1.73m2    Calcium 9.6 8.5 - 10.1 MG/DL   PTT    Collection Time: 11/13/19  4:25 AM   Result Value Ref Range    aPTT 70.6 (H) 22.1 - 32.0 sec    aPTT, therapeutic range     58.0 - 77.0 SECS       Problem List:  Problem List as of 11/13/2019 Date Reviewed: 11/8/2019          Codes Class Noted - Resolved    Lung abscess ([facility] ICD-10-CM: J85.2  ICD-9-CM: 513.0  11/8/2019 - Present        SIRS (systemic inflammatory response syndrome) (HCC) ICD-10-CM: R65.10  ICD-9-CM: 995.90  10/30/2019 - Present        Prolonged QT interval ICD-10-CM: R94.31  ICD-9-CM: 794.31  10/30/2019 - Present        GISELLA (acute kidney injury) ([facility].) ICD-10-CM: N17.9  ICD-9-CM: 584.9  10/30/2019 - Present        Thrombocytopenia (HCC) ICD-10-CM: D69.6  ICD-9-CM: 287.5  10/30/2019 - Present        Hypokalemia ICD-10-CM: E87.6  ICD-9-CM: 276.8  10/30/2019 - Present        Hypophosphatemia ICD-10-CM: E83.39  ICD-9-CM: 275.3  10/30/2019 - Present        Hyponatremia ICD-10-CM: E87.1  ICD-9-CM: 276.1  10/30/2019 - Present              Medications reviewed  Current Facility-Administered Medications   Medication Dose Route Frequency    docusate sodium (COLACE) capsule 100 mg  100 mg Oral DAILY    bisacodyl (DULCOLAX) suppository 10 mg  10 mg Rectal DAILY PRN    polyethylene glycol (MIRALAX) packet 17 g  17 g Oral DAILY    piperacillin-tazobactam (ZOSYN) 4.5 g in 0.9% sodium chloride (MBP/ADV) 100 mL  4.5 g IntraVENous Q8H    heparin 25,000 units in D5W 250 ml infusion  18-36 Units/kg/hr IntraVENous TITRATE    morphine injection 2 mg  2 mg IntraVENous Q4H PRN    sodium chloride (NS) flush 5-40 mL  5-40 mL IntraVENous Q8H    sodium chloride (NS) flush 5-40 mL  5-40 mL IntraVENous PRN    naloxone (NARCAN) injection 0.4 mg  0.4 mg IntraVENous PRN    diphenhydrAMINE (BENADRYL) capsule 25 mg  25 mg Oral Q4H PRN    ondansetron (ZOFRAN) injection 4 mg  4 mg IntraVENous Q4H PRN    acetaminophen (TYLENOL) tablet 650 mg  650 mg Oral Q4H PRN    HYDROcodone-acetaminophen (NORCO) 5-325 mg per tablet 1 Tab  1 Tab Oral Q4H PRN    ketorolac (TORADOL) injection 30 mg  30 mg IntraVENous Q6H PRN       Care Plan discussed with: Patient, mother, RN, Pulm NP  Total time spent with patient: 30 minutes.     Deon Briseno NP  Hospitalist  Providers can reach me directly at [phone] or [facility]

## 2019-11-13 NOTE — PROGRESS NOTES
EDOUARD plan:    -CM submitted consult written for care management to arrange IV ABX at home \"zosyn 4.5 GM IV q 6 hrs until 12/6\" to [facility]. They responded that patients' co-pay will be $17.13/day until her out of pocket is met ($1,500). -[facility] will come to meet with patient tomorrow.  -Once official orders are written cm will submit them as well.       Ruslan NIEVES, ACM

## 2019-11-14 ENCOUNTER — APPOINTMENT (OUTPATIENT)
Dept: GENERAL RADIOLOGY | Age: 24
DRG: 871 | End: 2019-11-14
Attending: INTERNAL MEDICINE
Payer: COMMERCIAL

## 2019-11-14 VITALS
TEMPERATURE: 97.9 F | OXYGEN SATURATION: 98 % | BODY MASS INDEX: 22.57 KG/M2 | SYSTOLIC BLOOD PRESSURE: 116 MMHG | DIASTOLIC BLOOD PRESSURE: 83 MMHG | HEART RATE: 87 BPM | WEIGHT: 123.4 LBS | RESPIRATION RATE: 16 BRPM

## 2019-11-14 LAB
APTT PPP: 38.6 SEC (ref 22.1–32)
APTT PPP: 95.6 SEC (ref 22.1–32)
COMMENT, HOLDF: NORMAL
SAMPLES BEING HELD,HOLD: NORMAL
THERAPEUTIC RANGE,PTTT: ABNORMAL SECS (ref 58–77)
THERAPEUTIC RANGE,PTTT: ABNORMAL SECS (ref 58–77)

## 2019-11-14 PROCEDURE — 74011250637 HC RX REV CODE- 250/637: Performed by: INTERNAL MEDICINE

## 2019-11-14 PROCEDURE — 85730 THROMBOPLASTIN TIME PARTIAL: CPT

## 2019-11-14 PROCEDURE — C1751 CATH, INF, PER/CENT/MIDLINE: HCPCS

## 2019-11-14 PROCEDURE — 77030020365 HC SOL INJ SOD CL 0.9% 50ML

## 2019-11-14 PROCEDURE — 36573 INSJ PICC RS&I 5 YR+: CPT | Performed by: INTERNAL MEDICINE

## 2019-11-14 PROCEDURE — 02HV33Z INSERTION OF INFUSION DEVICE INTO SUPERIOR VENA CAVA, PERCUTANEOUS APPROACH: ICD-10-PCS | Performed by: INTERNAL MEDICINE

## 2019-11-14 PROCEDURE — 74011250636 HC RX REV CODE- 250/636: Performed by: NURSE PRACTITIONER

## 2019-11-14 PROCEDURE — 76937 US GUIDE VASCULAR ACCESS: CPT

## 2019-11-14 PROCEDURE — 74011250636 HC RX REV CODE- 250/636: Performed by: INTERNAL MEDICINE

## 2019-11-14 PROCEDURE — 74011000258 HC RX REV CODE- 258: Performed by: INTERNAL MEDICINE

## 2019-11-14 PROCEDURE — 36415 COLL VENOUS BLD VENIPUNCTURE: CPT

## 2019-11-14 PROCEDURE — 71046 X-RAY EXAM CHEST 2 VIEWS: CPT

## 2019-11-14 PROCEDURE — 74011250637 HC RX REV CODE- 250/637: Performed by: HOSPITALIST

## 2019-11-14 RX ADMIN — HEPARIN SODIUM 26 UNITS/KG/HR: 10000 INJECTION, SOLUTION INTRAVENOUS at 02:25

## 2019-11-14 RX ADMIN — PIPERACILLIN, TAZOBACTAM 4.5 G: 4; .5 INJECTION, POWDER, LYOPHILIZED, FOR SOLUTION INTRAVENOUS at 16:28

## 2019-11-14 RX ADMIN — PIPERACILLIN, TAZOBACTAM 4.5 G: 4; .5 INJECTION, POWDER, LYOPHILIZED, FOR SOLUTION INTRAVENOUS at 06:59

## 2019-11-14 RX ADMIN — ACETAMINOPHEN 650 MG: 325 TABLET, FILM COATED ORAL at 16:26

## 2019-11-14 RX ADMIN — PIPERACILLIN, TAZOBACTAM 4.5 G: 4; .5 INJECTION, POWDER, LYOPHILIZED, FOR SOLUTION INTRAVENOUS at 00:20

## 2019-11-14 RX ADMIN — APIXABAN 10 MG: 5 TABLET, FILM COATED ORAL at 16:27

## 2019-11-14 NOTE — PROGRESS NOTES
RN spoke with Dr. Kiana Jean Baptiste regarding aPTT results and CXR results. New orders received via [facility]. Will proceed as ordered.

## 2019-11-14 NOTE — DISCHARGE INSTRUCTIONS
Discharge Instructions       PATIENT ID: Glenys Grijalva  MRN: [id number]   YOB: 1995    DATE OF ADMISSION: 11/8/2019  3:14 PM    DATE OF DISCHARGE: 11/14/2019    PRIMARY CARE PROVIDER: Jacquelyn Perkins MD     ATTENDING PHYSICIAN: Candance Fox, MD  DISCHARGING PROVIDER: Александр Turner MD    To contact this individual call [phone] and ask the  to page. If unavailable ask to be transferred the Adult Hospitalist Department. DISCHARGE DIAGNOSES septic lung emboli, DVT in R IJ    CONSULTATIONS: IP CONSULT TO PULMONOLOGY  IP CONSULT TO INFECTIOUS DISEASES  IP CONSULT TO VASCULAR SURGERY    PROCEDURES/SURGERIES: * No surgery found *    FOLLOW UP APPOINTMENTS:   Follow-up Information     Follow up With Specialties Details Why Contact Info    Lacy Ferrer MD Infectious Diseases   [facility]  [phone]      Jacquelyn Perkins MD [facility] In 1 week  [address]  [phone]             ADDITIONAL CARE RECOMMENDATIONS: follow up with PCP and ID    DIET: Resume previous diet    DISCHARGE MEDICATIONS:  Eliquis    · It is important that you take the medication exactly as they are prescribed. · Keep your medication in the bottles provided by the pharmacist and keep a list of the medication names, dosages, and times to be taken in your wallet. · Do not take other medications without consulting your doctor. NOTIFY YOUR PHYSICIAN FOR ANY OF THE FOLLOWING:   Fever over 101 degrees for 24 hours. Chest pain, shortness of breath, fever, chills, nausea, vomiting, diarrhea, change in mentation, falling, weakness, bleeding. Severe pain or pain not relieved by medications. Or, any other signs or symptoms that you may have questions about. It was our pleasure to help take care of you and we hope you get well very soon.     DISPOSITION:    Home With:   OT  PT  [place]  RN       SNF/Inpatient Rehab/LTAC   x Independent/assisted living    Hospice    Other:     CDMP Checked:   Yes x     PROBLEM LIST Updated:  Yes x     Signed:   Redge Holter, MD  11/14/2019  12:32 PM

## 2019-11-14 NOTE — PROGRESS NOTES
EDOUARD plan:    -Cm submitted official ABX orders to HCP/[facility] via [facility].  -Cm met with patient and her mother to discuss the co-pay. -Patient waiting to have a Chest x-ray today and is hopeful she will be able to be discharged home (will need PICC line placed). 3:30pm: Patient currently getting her PICC line placed. Cm notified HCP/[facility] and they will be coming to the hospital at 6 pm to do the teaching at bedside with the patient. Cm notified nursing and MD that patient will be able to leave [facility] from a care management standpoint.      Phani Palma BSW, ACM

## 2019-11-14 NOTE — DISCHARGE SUMMARY
Discharge Summary       PATIENT ID: Mary Jo Mcconnell  MRN: [id number]   YOB: 1995    DATE OF ADMISSION: 11/8/2019  3:14 PM    DATE OF DISCHARGE: 11/14/2019   PRIMARY CARE PROVIDER: Darrell Lemos MD     ATTENDING PHYSICIAN: Elyse Salgado MD  DISCHARGING PROVIDER: Elyse Salgado MD    To contact this individual call [phone] and ask the  to page. If unavailable ask to be transferred the Adult Hospitalist Department. CONSULTATIONS: IP CONSULT TO PULMONOLOGY  IP CONSULT TO INFECTIOUS DISEASES  IP CONSULT TO VASCULAR SURGERY    PROCEDURES/SURGERIES: * No surgery found *    ADMITTING DIAGNOSES & HOSPITAL COURSE:   Admitted with neck swelling, diagnosed with DVT in R IJ, and got septic emboli to her lungs from septic DVT, caused by throat/neck infection. Treated with AC and abx, with good improvement. Risk of Re-Admission: low  DISCHARGE DIAGNOSES / PLAN:      Lung abscess:   Lemierre syndrome.  -Small areas on R and L on CT; seen by pulm 11/9-small abscess vs PNA vs septic emboli- echo- preserved ef, no vegitations.  -+clot RIJ-Heparin gtt started 11/10- blood cultures -ve. -UA unremarkable, culture shows mixed urogenital mushtaq-Pain significantly improved. -WBC much improved - ID following, Vanc stopped by ID 11/9- Continue zosyn via PICC line on dc-Appreciate pulm - needs repeat Chest CT 4-6 weeks     Partial Occlusion of RIJ due to DVT on CT 11/10: confirmed by Duplex.   -Hyper-coag panel sent and pending - consult hem/onc if abnormal  -Seen by Dr. Keysha Shipman w/ heparin as IP, Eliquis x3 months as OP     Hepatomegaly and Splenomegaly d/t mononucleosis:  -hepatomegaly resolved on CT 11/8-splenomegaly improved, asymptomatic     FOLLOW UP APPOINTMENTS:    Follow-up Information     Follow up With Specialties Details Why Contact Info    Nisreen Pettit MD Infectious Diseases   [address] [place] [address] [address] [place] [address]  [phone] Whitfield Siemens, MD Family Practice In 1 week  [address] [facility] [address], Infusion Therapy   [address] [facility] [address] [facility]  [phone]         ADDITIONAL CARE RECOMMENDATIONS:  Follow up with PCP, ID and Pulmonary    DIET: Resume previous diet    ACTIVITY: Activity as tolerated    DISCHARGE MEDICATIONS:  Current Discharge Medication List      START taking these medications    Details   apixaban (ELIQUIS) 5 mg tablet Take 2 tabs '10mg' twice daily for 1 week, then take 1 tab '5mg' twice daily  Qty: 60 Tab, Refills: 0         CONTINUE these medications which have NOT CHANGED    Details   acetaminophen (TYLENOL) 325 mg tablet Take 325 mg by mouth every four (4) hours as needed for Pain. STOP taking these medications       norethindrone-ethinyl estradiol (JUNEL FE 1/20, 28,) 1 mg-20 mcg (21)/75 mg (7) tab Comments:   Reason for Stopping:         ibuprofen (ADVIL) 200 mg tablet Comments:   Reason for Stopping:             NOTIFY YOUR PHYSICIAN FOR ANY OF THE FOLLOWING:   Fever over 101 degrees for 24 hours. Chest pain, shortness of breath, fever, chills, nausea, vomiting, diarrhea, change in mentation, falling, weakness, bleeding. Severe pain or pain not relieved by medications. Or, any other signs or symptoms that you may have questions about. DISPOSITION:    Home With:   OT  PT  HH  RN       Long term SNF/Inpatient Rehab   x Independent/assisted living    Hospice    Other:     PATIENT CONDITION AT DISCHARGE:     Functional status    Poor     Deconditioned     Independent      Cognition     Lucid     Forgetful     Dementia      Catheters/lines (plus indication)    Strong     PICC     PEG     None      Code status     Full code     DNR      PHYSICAL EXAMINATION AT DISCHARGE:  Patient seen and examined at bedside, Condition stable, explained discharge and follow up plans.   BP 95/65 (BP 1 Location: Left arm, BP Patient Position: At rest)   Pulse 69   Temp 98 °F (36.7 °C)   Resp 18   Wt 56 kg (123 lb 6.4 oz)   SpO2 97%   Breastfeeding? No   BMI 22.57 kg/m²   General:  Alert, oriented, No acute distress  Resp:  No accessory muscle use, Good AE. Neuro:  Grossly normal, no focal neuro deficits, follows commands   CHRONIC MEDICAL DIAGNOSES:  Problem List as of 11/14/2019 Date Reviewed: 11/8/2019          Codes Class Noted - Resolved    Lung abscess ([facility] ICD-10-CM: J85.2  ICD-9-CM: 513.0  11/8/2019 - Present        SIRS (systemic inflammatory response syndrome) ([facility] ICD-10-CM: R65.10  ICD-9-CM: 995.90  10/30/2019 - Present        Prolonged QT interval ICD-10-CM: R94.31  ICD-9-CM: 794.31  10/30/2019 - Present        GISELLA (acute kidney injury) ([facility].) ICD-10-CM: N17.9  ICD-9-CM: 584.9  10/30/2019 - Present        Thrombocytopenia ([facility].) ICD-10-CM: D69.6  ICD-9-CM: 287.5  10/30/2019 - Present        Hypokalemia ICD-10-CM: E87.6  ICD-9-CM: 276.8  10/30/2019 - Present        Hypophosphatemia ICD-10-CM: E83.39  ICD-9-CM: 275.3  10/30/2019 - Present        Hyponatremia ICD-10-CM: E87.1  ICD-9-CM: 276.1  10/30/2019 - Present              38 minutes were spent with the patient on counseling and coordination of care.     Signed:   Ene Edgar MD  11/14/2019  12:33 PM

## 2019-11-14 NOTE — PROGRESS NOTES
Bedside shift change report given to Geraldo Sloan RN (oncoming nurse) by Rudolph Turner RN (offgoing nurse). Report included the following information SBAR, Kardex, Procedure Summary, Intake/Output and MAR.

## 2019-11-14 NOTE — PROGRESS NOTES
At 10:19a.m., received notification from lab of critical aPTT results 95.6. Will follow algorithm per [facility] P MAIRNE ASHFORD RN notified Dr. Lali Sierra per protocol. No new orders received at this time.

## 2019-11-14 NOTE — PROCEDURES
PICC Placement Note : Order received and consent obtained from patient after explaining the reason for  PICC placement, the procedure,risks,benefits and potential complications. An opportunity was provided to ask questions and relay concerns. 4 fr Power Solo PICC was placed using sterile technique and max barrier precautions at patients bedside. Modified Seldinger Technique with ultrasound guidance used to locate the vein. Vein accessed with 21G Introducer Safety Needle and guidewire easily thread. OrderAhead PICC tip  device used to determine PICC tip direction. PRE-PROCEDURE VERIFICATION  Correct Procedure: yes  Correct Site:  yes  Temperature: Temp: 97.9 °F (36.6 °C), Temperature Source: Temp Source: Oral  Recent Labs     11/13/19  0425   BUN 7   CREA 0.85   *   WBC 8.6     Allergies: Sulfa (sulfonamide antibiotics)  Education materials, including PICC Booklet, for PICC Care given to patient: yes. See Patient Education activity for further details. PROCEDURE DETAIL  A single lumen PICC line was started for antibiotic therapy. The following documentation is in addition to the PICC properties in the lines/airways flowsheet :  Lot #: [id number]  Was xylocaine 1% used intradermally:  yes 1 ml used. Catheter Length: 39 (cm)  Vein Selection for PICC:left basilic  Central Line Bundle followed yes  Complication Related to Insertion: none    The placement was verified by ECG technology. PICC is in the SVC per ECG waveform. Waveform documented in the paper chart. Handoff done with Khadar Bassett RN and PICC placement discussed.     Line is okay to use: yes    Denise Steele RN

## 2019-11-14 NOTE — PROGRESS NOTES
Spiritual Care Assessment/Progress Note  [facility]      NAME: Renard Sharpe      MRN: [id number]  AGE: 30 y.o. SEX: female  Buddhist Affiliation: No preference   Language: English     11/14/2019     Total Time (in minutes): 5     Spiritual Assessment begun in [facility] [phone] through conversation with:         []Patient        [] Family    [] Friend(s)        Reason for Consult: Initial/Spiritual assessment, patient floor     Spiritual beliefs: (Please include comment if needed)     [] Identifies with a lorena tradition:         [] Supported by a lorena community:            [] Claims no spiritual orientation:           [] Seeking spiritual identity:                [] Adheres to an individual form of spirituality:           [x] Not able to assess:                           Identified resources for coping:      [] Prayer                               [] Music                  [] Guided Imagery     [] Family/friends                 [] Pet visits     [] Devotional reading                         [x] Unknown     [] Other:                                               Interventions offered during this visit: (See comments for more details)    Patient Interventions: Spiritual care volunteer support           Plan of Care:     [] Support spiritual and/or cultural needs    [] Support AMD and/or advance care planning process      [] Support grieving process   [] Coordinate Rites and/or Rituals    [] Coordination with community clergy   [] No spiritual needs identified at this time   [] Detailed Plan of Care below (See Comments)  [] Make referral to Music Therapy  [] Make referral to Pet Therapy     [] Make referral to Addiction services  [] Make referral to [facility]  [] Make referral to Spiritual Care Partner  [] No future visits requested        [x] Follow up visits as needed     Comments:  visit for initial spiritual assessment.   Door closed for privacy, lights low, patient appears to be asleep, did not respond or awaken to knock at door or verbal greeting. Will continue to follow up as needed and upon request as able. Visited by Rev. Jose F Aguilar MDiv, [facility], [facility] p[facility]: [phone])

## 2019-11-14 NOTE — PROGRESS NOTES
Home IV Antibiotic Orders - [employer]  November 14, 2019    1. 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 Routine PICC care    3. Antibiotic:  Zosyn 4.5 GM IV q 6 hours by portable infusion pump    4. Lab each Monday & Thursday:   CBC/diff/platelets   CMP    5. Fax lab to Dr. Caroline Ramírez @ [phone]    6. Call Dr. Caroline Ramírez @ [phone] for WBC <3500, creatinine > 1.2, or platelets <660,235    7. Duration of therapy: Not yet determined; possibly til 12/6/2019 but could be longer depending on response of lung abscesses   Please call Dr. Caroline Ramírez @ [phone] before stopping therapy. 8.  Allergies: Allergies   Allergen Reactions    Sulfa (Sulfonamide Antibiotics) Hives     11/8/19 \"Extreme hives\" per Mother     5.  Appt Dr. Caroline Ramírez in 1-2 weeks    Aly Wang MD  PHONE [phone]

## 2019-11-14 NOTE — PROGRESS NOTES
Bedside shift change report given to Sourav Giordano RN (oncoming nurse) by Julian Pettit RN (offgoing nurse). Report included the following information SBAR and Kardex.

## 2019-11-14 NOTE — PROGRESS NOTES
Verbal order received from Dr. Bernard Mayen to hold heparin while patient is off floor for xray, and keep holding on return prior to PICC insertion, planned for this afternoon.   Also received verbal order for aPTT at 2pm.

## 2019-11-14 NOTE — PROGRESS NOTES
Infectious Diseases Progress Note    Antibiotic Summary:  Vancomycin  --   Zosyn   -- present      Subjective:     She feels well with no CP today. She had 2 brief episodes of abnormal smells each lasting about 30 seconds (burned marshmellows once and maple syrup the other time)    ROS:  Constitutional: no fever or rigors  HEENT: no HA or change in vision   Skin: no pruritis or rash  Resp: no SOB, hemoptysis, ZARCO -- ambulated in flores without difficulty  CV: no symptoms of CHF  GI: no N, V, D.   MS: no joint complaints  Neuro: no neuro symptoms; 2 episodes of abnormal smells as above    Objective:     Vitals:   Visit Vitals  /73 (BP 1 Location: Left arm, BP Patient Position: At rest)   Pulse 97   Temp 98.1 °F (36.7 °C)   Resp 16   Wt 56 kg (123 lb 6.4 oz)   LMP 10/14/2019 (Approximate)   SpO2 96%   Breastfeeding? No   BMI 22.57 kg/m²        Tmax:  Temp (24hrs), Av.3 °F (36.8 °C), Min:98.1 °F (36.7 °C), Max:98.5 °F (36.9 °C)      Exam:  General appearance: alert, no distress  Eyes: sclera and conj benign  Neck: thrombosed RIJ palpable and nontender  Lungs: clear to auscultation bilaterally with decreased BS right base with dullness to percussion; no pleural rub heard  Heart: regular rate and rhythm  Abdomen: soft, non-tender. Bowel sounds normal. No masses,  no organomegaly  Extremities: no edema  Skin: no rash  Neurologic: A&O    IV Lines: peripheral    Labs:    Recent Labs     19  0425 19  0520 19  2048 19  0307   WBC 8.6 9.3 10.7 10.7   HGB 9.5* 8.7* 9.4* 8.9*   * 564* 613* 530*   BUN 7 8  --  14   CREA 0.85 0.74  --  0.64   BLOOD CULTURES:   10/29 = Fusobacterium necrophorum in 2 of 4 bottles    = NGSF    = NGSF    Portable CXR  reviewed = haziness at right base suggesting small pleural effusion    TTE on 2019 = valves WNL, no vegetations, LVEF 61-65%    Assessment:     1.  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 She has a very small right pleural effusion. If this increases in size, she will need a thoracentesis to R/O empyema. She appears to be responding very well to antibiotics. The bacteremia has cleared. The right pleural effusion is big enough to detect on exam -- will get PA & lat CXR in AM to see if it is increasing    Plan:     1. Continue Zosyn    2. CXR PA & lat in AM to reassess pleural effusion and as baseline since only CXR this admission is portable    3.  I think she could go home on IV Zosyn if pleural effusion is stable -- suggested taking a probiotic at home      Discussed at length with the patient and her parents    Uli Wilkerson MD

## 2019-11-15 LAB
BACTERIA SPEC CULT: NORMAL
F2 GENE MUT ANL BLD/T: NORMAL
F5 GENE MUT ANL BLD/T: NORMAL
SERVICE CMNT-IMP: NORMAL

## 2019-11-16 LAB
BACTERIA SPEC CULT: NORMAL
SERVICE CMNT-IMP: NORMAL

## 2019-11-18 ENCOUNTER — TELEPHONE (OUTPATIENT)
Dept: FAMILY MEDICINE CLINIC | Age: 24
End: 2019-11-18

## 2019-12-03 ENCOUNTER — OFFICE VISIT (OUTPATIENT)
Dept: FAMILY MEDICINE CLINIC | Age: 24
End: 2019-12-03

## 2019-12-03 VITALS
HEART RATE: 75 BPM | OXYGEN SATURATION: 98 % | SYSTOLIC BLOOD PRESSURE: 110 MMHG | DIASTOLIC BLOOD PRESSURE: 78 MMHG | HEIGHT: 62 IN | WEIGHT: 123.4 LBS | RESPIRATION RATE: 16 BRPM | TEMPERATURE: 98.5 F | BODY MASS INDEX: 22.71 KG/M2

## 2019-12-03 DIAGNOSIS — J02.9 LEMIERRE SYNDROME: Primary | ICD-10-CM

## 2019-12-03 DIAGNOSIS — I80.8 LEMIERRE SYNDROME: Primary | ICD-10-CM

## 2019-12-03 DIAGNOSIS — J90 PLEURAL EFFUSION ON RIGHT: ICD-10-CM

## 2019-12-03 DIAGNOSIS — J85.2 ABSCESS OF LOWER LOBE OF RIGHT LUNG WITHOUT PNEUMONIA (HCC): ICD-10-CM

## 2019-12-03 PROBLEM — E87.1 HYPONATREMIA: Status: RESOLVED | Noted: 2019-10-30 | Resolved: 2019-12-03

## 2019-12-03 PROBLEM — E83.39 HYPOPHOSPHATEMIA: Status: RESOLVED | Noted: 2019-10-30 | Resolved: 2019-12-03

## 2019-12-03 PROBLEM — E87.6 HYPOKALEMIA: Status: RESOLVED | Noted: 2019-10-30 | Resolved: 2019-12-03

## 2019-12-03 PROBLEM — R94.31 PROLONGED QT INTERVAL: Status: RESOLVED | Noted: 2019-10-30 | Resolved: 2019-12-03

## 2019-12-03 PROBLEM — I80.9 SEPTIC THROMBOPHLEBITIS: Status: ACTIVE | Noted: 2019-12-03

## 2019-12-03 PROBLEM — N17.9 AKI (ACUTE KIDNEY INJURY) (HCC): Status: RESOLVED | Noted: 2019-10-30 | Resolved: 2019-12-03

## 2019-12-03 NOTE — LETTER
NOTIFICATION RETURN TO WORK / SCHOOL 
 
12/3/2019 9:34 AM 
 
Ms. Mikal Cortez [phone] To Whom It May Concern: 
 
Mikal Cortez is currently under the care of LINDA [facility]. She was seen in office may return to work 12/3/19 If there are questions or concerns please have the patient contact our office.  
 
 
 
Sincerely, 
 
 
Allison Shelton MD

## 2019-12-03 NOTE — PROGRESS NOTES
Albert Menendez  30 y.o. female  1995  [address] [facility]  [id number]     LINDA Constantino       Encounter Date: 12/3/2019           Established Patient Visit Note: Jocelyne Franklin MD    Reason for Appointment:  Chief Complaint   Patient presents with   [facility] Follow Up     [address] [place] [address] 11/08/19       History of Present Illness:  History provided by patient    Albert Menendez is a 30 y.o. female who presents to clinic today for:    Hospital Follow Up  Patient Contacted within 2 days of discharge:no   Admit Date: 11/8/2019  Discharge Date: 11/14/19  Hospital Diagnosis: Lemierre syndrome, Fusobacterium necrophorum septic thrombophlebitis of the right internal jugular vein complicated by septic pulmonary emboli resulting in bilateral small lung abscesses and a small right pleural effusion  Medication Changes on Discharge:  · She will complete 4 weeks of IV Zosyn on 12/6/2019  · Eliquis: 10mb bid x 7 days, then 5mg bid (total course is 3 months from 11/14/19)   Labs: patient is having regular labs drawn by ID, and she will have a copy of these sent to us. Specialist Followup:   · Infectious Disease (Dr. Diana Cornejo): last seen by ID on 11/25/19 with plan to obtain CT on 12/4/19 and f/u 12/6/19 to see if PICC can be removed. She will f/u with pulmonology this month. · Pulmonology: patient will schedule f/u visit with pulmonology this month  Symptoms Since Discharge: Pain went away last week. Dyspnea has improved. Started back at work Monday of last week. Appetite is back to normal.    Review of Systems  Comprehensive ROS negative except as discussed in HPI      Allergies: Sulfa (sulfonamide antibiotics)    Medications: (Updated to reflect final medication list after visit)    Current Outpatient Medications:     [START ON 12/8/2019] apixaban (ELIQUIS) 5 mg tablet, Take 1 Tab by mouth two (2) times a day for 67 days. , Disp: 134 Tab, Rfl: 0    acetaminophen (TYLENOL) 325 mg tablet, Take 325 mg by mouth every four (4) hours as needed for Pain., Disp: , Rfl:     History  Patient Care Team:  Evangeline Oppenheim, MD as PCP - General (Family Practice)  Evangeline Oppenheim, MD as PCP - REHABILITATION [facility][1]  Bridgette Curling., MD as Physician (Infectious Diseases)    Past Medical History: she has a past medical history of Lemierre syndrome (12/3/2019), Lung abscess ([address] (11/8/2019), and Pleural effusion on right (12/3/2019). Past Surgical History: she has no past surgical history on file. Family Medical History: family history includes Stroke in her maternal grandfather. Social History: she reports that she has never smoked. She has never used smokeless tobacco. She reports that she does not drink alcohol or use drugs. Objective:   Visit Vitals  /78 (BP 1 Location: Right arm, BP Patient Position: Sitting)   Pulse 75   Temp 98.5 °F (36.9 °C) (Oral)   Resp 16   Ht 5' 2\" (1.575 m)   Wt 123 lb 6.4 oz (56 kg)   LMP 10/20/2019 (Approximate)   SpO2 98%   BMI 22.57 kg/m²     Wt Readings from Last 3 Encounters:   12/03/19 123 lb 6.4 oz (56 kg)   11/09/19 123 lb 6.4 oz (56 kg)   11/08/19 122 lb (55.3 kg)       General appearance: NAD, conversant   Eyes: anicteric sclerae, moist conjunctivae; no lid-lag; PERRLA  HENT: Atraumatic; oropharynx clear with moist mucous membranes and no mucosal ulcerations; normal hard and soft palate  Neck: Trachea midline; FROM, supple, no thyromegaly or lymphadenopathy  Lungs: CTA, with normal respiratory effort and no intercostal retractions  CV: Regular rate, normal rhythm, no JVD, no MRGs   Abdomen: Soft, non-tender; no masses or HSM  Extremities: PICC in place without any surrounding erythema or swelling  Skin: Normal temperature, turgor and texture; no rash, ulcers or subcutaneous nodules  Neuro: CN 2-12 grossly intact. DTRs 2+ and symmetrical throughout. Stable gait.   Psych: Appropriate affect, alert and oriented to person, place and time      Assessment & Plan:    Diagnoses and all orders for this visit:    1. Lemierre syndrome  Assessment & Plan:  Improving, patient followed by ID and f/u CT scheduled this week  - obtain CT scan scheduled for this week per ID  - discussed red flag symptoms and reasons to call or go to ED    Orders:  -     apixaban (ELIQUIS) 5 mg tablet; Take 1 Tab by mouth two (2) times a day for 67 days. 2. Pleural effusion on right  Assessment & Plan:  Improving, patient's breathing has improved  - f/u CT scan as scheduled by ID  - f/u with pulmonology as scheduled  - discussed red flag symptoms and reasons to call or go to ED      3. Abscess of lower lobe of right lung without pneumonia [facility])  Assessment & Plan:  Improving, dyspnea has resolved  - obtain CT scan as scheduled by ID  - f/u with pulmonology as scheduled  - continue zosyn per ID  - discussed red flag symptoms and reasons to call or go to ED            I have discussed the diagnosis with the patient and the intended plan as seen in the above orders. The patient has received an after-visit summary along with patient information handout. I have discussed medication side effects and warnings with the patient as well. Dispostion  Follow-up and Dispositions    · Return in about 2 months (around 2/3/2020).            Nilda Voss MD

## 2019-12-03 NOTE — PATIENT INSTRUCTIONS
Apixaban (By mouth)   Apixaban (a-PIX-a-ban)  Treats and prevents blood clots. This medicine is a blood thinner. Brand Name(s): Eliquis   There may be other brand names for this medicine. When This Medicine Should Not Be Used: This medicine is not right for everyone. Do not use it if you had an allergic reaction to apixaban or you have active bleeding. How to Use This Medicine:   Tablet  · Your doctor will tell you how much medicine to use. Do not use more than directed. · If you are not able to swallow the tablets whole, they may be crushed and mixed in water, 5% dextrose in water (D5W), apple juice, or applesauce. The crushed tablets may be mixed with 60 mL of water or D5W dose and given through a nasogastric tube (NGT). · This medicine should come with a Medication Guide. Ask your pharmacist for a copy if you do not have one. · Missed dose: Take a dose as soon as you remember. If it is almost time for your next dose, wait until then and take a regular dose. Do not take extra medicine to make up for a missed dose. · Store the medicine in a closed container at room temperature, away from heat, moisture, and direct light. Drugs and Foods to Avoid:   Ask your doctor or pharmacist before using any other medicine, including over-the-counter medicines, vitamins, and herbal products. · Some medicines can affect how apixaban works. Tell your doctor if you are using any of the following:   ¨ Carbamazepine, clarithromycin, itraconazole, ketoconazole, phenytoin, rifampin, ritonavir, Enoc's wort  ¨ Blood thinner (including clopidogrel, heparin, prasugrel, warfarin)  ¨ Medicine to treat depression  ¨ NSAID pain or arthritis medicine (including aspirin, celecoxib, diclofenac, ibuprofen, naproxen)  Warnings While Using This Medicine:   · Tell your doctor if you are pregnant or breastfeeding, or if you have kidney disease, liver disease, bleeding problems, or an artificial heart valve.   · Do not stop using this medicine suddenly without asking your doctor. You might have a higher risk of stroke for a short time after you stop using this medicine. · This medicine increases your risk for bleeding that can become serious if not controlled. You may also bruise easily, and it may take longer than usual for bleeding to stop. · This medicine may increase your risk for blood clots in your spine or back if you undergo an epidural or spinal puncture. This could lead to paralysis. Tell your doctor if you ever had spine problems or back surgery. · Tell any doctor or dentist who treats you that you are using this medicine. With your doctor's supervision, you may need to stop using this medicine several days before you have surgery or medical tests. · Your doctor will do lab tests at regular visits to check on the effects of this medicine. Keep all appointments. · Keep all medicine out of the reach of children. Never share your medicine with anyone. Possible Side Effects While Using This Medicine:   Call your doctor right away if you notice any of these side effects:  · Allergic reaction: Itching or hives, swelling in your face or hands, swelling or tingling in your mouth or throat, chest tightness, trouble breathing  · Change in how much or how often you urinate, red or pink urine  · Chest pain, trouble breathing  · Coughing up blood, vomiting blood or material that looks like coffee grounds  · Numbness, tingling, or muscle weakness in your legs or feet  · Red or black, tarry stools  · Unusual bleeding, bruising, or weakness  If you notice other side effects that you think are caused by this medicine, tell your doctor. Call your doctor for medical advice about side effects. You may report side effects to FDA at 3-660-FDA-7999  © 2017 [facility] Information is for End User's use only and may not be sold, redistributed or otherwise used for commercial purposes. The above information is an  only. It is not intended as medical advice for individual conditions or treatments. Talk to your doctor, nurse or pharmacist before following any medical regimen to see if it is safe and effective for you.

## 2019-12-03 NOTE — PROGRESS NOTES
Chief Complaint   Patient presents with   [facility] Follow Up     [address] [place] [address] 11/08/19     1. Have you been to the ER, urgent care clinic since your last visit? Hospitalized since your last visit? Yes, patient was at the ER in [address] [place] [address] due to an infection. 2. Have you seen or consulted any other health care providers outside of the [address] [place] [address] since your last visit? Include any pap smears or colon screening. No      Patient will follow up with Dr. Kristie Page on Friday. Patient will have a CT scan tomorrow.

## 2019-12-04 ENCOUNTER — HOSPITAL ENCOUNTER (OUTPATIENT)
Dept: CT IMAGING | Age: 24
Discharge: HOME | End: 2019-12-04
Attending: INTERNAL MEDICINE
Payer: COMMERCIAL

## 2019-12-04 DIAGNOSIS — J85.2 LUNG ABSCESS (HCC): ICD-10-CM

## 2019-12-04 PROCEDURE — 71250 CT THORAX DX C-: CPT

## 2019-12-04 NOTE — ASSESSMENT & PLAN NOTE
Improving, dyspnea has resolved  - obtain CT scan as scheduled by ID  - f/u with pulmonology as scheduled  - continue zosyn per ID  - discussed red flag symptoms and reasons to call or go to ED

## 2019-12-04 NOTE — ASSESSMENT & PLAN NOTE
Improving, patient followed by ID and f/u CT scheduled this week  - obtain CT scan scheduled for this week per ID  - discussed red flag symptoms and reasons to call or go to ED

## 2019-12-04 NOTE — ASSESSMENT & PLAN NOTE
Improving, patient's breathing has improved  - f/u CT scan as scheduled by ID  - f/u with pulmonology as scheduled  - discussed red flag symptoms and reasons to call or go to ED

## 2020-02-04 ENCOUNTER — OFFICE VISIT (OUTPATIENT)
Dept: OBGYN CLINIC | Age: 25
End: 2020-02-04

## 2020-02-04 VITALS
BODY MASS INDEX: 23 KG/M2 | DIASTOLIC BLOOD PRESSURE: 85 MMHG | HEIGHT: 62 IN | SYSTOLIC BLOOD PRESSURE: 130 MMHG | WEIGHT: 125 LBS

## 2020-02-04 DIAGNOSIS — Z01.419 ENCOUNTER FOR GYNECOLOGICAL EXAMINATION WITHOUT ABNORMAL FINDING: Primary | ICD-10-CM

## 2020-02-04 DIAGNOSIS — N94.89 SUPPRESSION OF MENSES: ICD-10-CM

## 2020-02-04 DIAGNOSIS — J02.9 LEMIERRE SYNDROME: ICD-10-CM

## 2020-02-04 DIAGNOSIS — I80.8 LEMIERRE SYNDROME: ICD-10-CM

## 2020-02-04 NOTE — PROGRESS NOTES
Deann Lamar is a No obstetric history on file. ,  30 y.o. female [facility] whose Patient's last menstrual period was 01/20/2020 (approximate). was on 1/7/2020 who presents for her annual checkup. She is having no significant problems. With regard to the Gardisil vaccine, she has received all 3 injections. Menstrual status:    Her periods are moderate in flow. She is using five to ten pads or tampons per day, usually regular every 26-30 days. She denies dysmenorrhea. She reports no premenstrual symptoms. Contraception:    The current method of family planning is none and she would like to discuss options. She has a hx of a blood clot, currently taking Eliquis. Sexual history:    She  reports being sexually active and has had partner(s) who are Male. She reports using the following method of birth control/protection: None. Medical conditions:    Since her last annual GYN exam about 4 ago per patient, she has not the following changes in her health history: none. Pap and Mammogram History:    Her most recent Pap smear was normal obtained 4 year(s) ago, per patient. The patient has never had a mammogram.    The patient does not have a family history of breast cancer. 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 No pertinent surgical history. Current Outpatient Medications   Medication Sig Dispense Refill    apixaban (ELIQUIS) 5 mg tablet Take 1 Tab by mouth two (2) times a day for 67 days. 134 Tab 0    acetaminophen (TYLENOL) 325 mg tablet Take 325 mg by mouth every four (4) hours as needed for Pain.        Allergies: Sulfa (sulfonamide antibiotics)   Social History     Socioeconomic History    Marital status: SINGLE     Spouse name: Not on file    Number of children: Not on file    Years of education: Not on file    Highest education level: Not on file   Occupational History    Not on file   Social Needs    Financial resource strain: Not on file    Food insecurity:     Worry: Not on file     Inability: Not on file    Transportation needs:     Medical: Not on file     Non-medical: Not on file   Tobacco Use    Smoking status: Never Smoker    Smokeless tobacco: Never Used   Substance and Sexual Activity    Alcohol use: Never     Frequency: Never    Drug use: Never    Sexual activity: Yes     Partners: Male     Birth control/protection: None   Lifestyle    Physical activity:     Days per week: Not on file     Minutes per session: Not on file    Stress: Not on file   Relationships    Social connections:     Talks on phone: Not on file     Gets together: Not on file     Attends Islam service: Not on file     Active member of club or organization: Not on file     Attends meetings of clubs or organizations: Not on file     Relationship status: Not on file    Intimate partner violence:     Fear of current or ex partner: Not on file     Emotionally abused: Not on file     Physically abused: Not on file     Forced sexual activity: Not on file   Other Topics Concern    Not on file   Social History Narrative    Not on file     Tobacco History:  reports that she has never smoked. She has never used smokeless tobacco.  Alcohol Abuse:  reports no history of alcohol use. Drug Abuse:  reports no history of drug use.     Patient Active Problem List   Diagnosis Code    Thrombocytopenia ([facility] [address] D69.6    [facility] J85.2    Lemierre syndrome I80.8    Pleural effusion on right J90       Review of Systems - History obtained from the patient  Constitutional: negative for weight loss, fever, night sweats  HEENT: negative for hearing loss, earache, congestion, snoring, sorethroat  CV: negative for chest pain, palpitations, edema  Resp: negative for cough, shortness of breath, wheezing  GI: negative for change in bowel habits, abdominal pain, black or bloody stools  : negative for frequency, dysuria, hematuria, vaginal discharge  MSK: negative for back pain, joint pain, muscle pain  Breast: negative for breast lumps, nipple discharge, galactorrhea  Skin :negative for itching, rash, hives  Neuro: negative for dizziness, headache, confusion, weakness  Psych: negative for anxiety, depression, change in mood  Heme/lymph: negative for bleeding, bruising, pallor    Physical Exam    Visit Vitals  /85   Ht 5' 2\" (1.575 m)   Wt 125 lb (56.7 kg)   LMP 01/20/2020 (Approximate)   BMI 22.86 kg/m²       Constitutional  · Appearance: well-nourished, well developed, alert, in no acute distress    HENT  · Head and Face: appears normal    Neck  · Inspection/Palpation: normal appearance, no masses or tenderness  · Lymph Nodes: no lymphadenopathy present  · Thyroid: gland size normal, nontender, no nodules or masses present on palpation    Chest  · Respiratory Effort: breathing normal  · Auscultation: normal breath sounds    Cardiovascular  · Heart:  · Auscultation: regular rate and rhythm without murmur    Breasts  · Inspection of Breasts: breasts symmetrical, no skin changes, no discharge present, nipple appearance normal, no skin retraction present  · Palpation of Breasts and Axillae: no masses present on palpation, no breast tenderness  · Axillary Lymph Nodes: no lymphadenopathy present    Gastrointestinal  · Abdominal Examination: abdomen non-tender to palpation, normal bowel sounds, no masses present  · Liver and spleen: no hepatomegaly present, spleen not palpable  · Hernias: no hernias identified    Genitourinary  · External Genitalia: normal appearance for age, no discharge present, no tenderness present, no inflammatory lesions present, no masses present, no atrophy present  · Vagina: normal vaginal vault without central or paravaginal defects, no discharge present, no inflammatory lesions present, no masses present  · Bladder: non-tender to palpation  · Urethra: appears normal  · Cervix: normal   · Uterus: normal size, shape and consistency  · Adnexa: no adnexal tenderness present, no adnexal masses present  · Perineum: perineum within normal limits, no evidence of trauma, no rashes or skin lesions present  · Anus: anus within normal limits, no hemorrhoids present  · Inguinal Lymph Nodes: no lymphadenopathy present    Skin  · General Inspection: no rash, no lesions identified    Neurologic/Psychiatric  · Mental Status:  · Orientation: grossly oriented to person, place and time  · Mood and Affect: mood normal, affect appropriate    . Assessment:  Routine gynecologic examination  Her current medical status is satisfactory with no evidence of significant gynecologic issues.   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

## 2020-02-06 ENCOUNTER — OFFICE VISIT (OUTPATIENT)
Dept: OBGYN CLINIC | Age: 25
End: 2020-02-06

## 2020-02-06 VITALS
DIASTOLIC BLOOD PRESSURE: 87 MMHG | WEIGHT: 125 LBS | SYSTOLIC BLOOD PRESSURE: 140 MMHG | HEIGHT: 62 IN | BODY MASS INDEX: 23 KG/M2

## 2020-02-06 DIAGNOSIS — Z30.430 ENCOUNTER FOR IUD INSERTION: Primary | ICD-10-CM

## 2020-02-06 LAB
HCG URINE, QL. (POC): NEGATIVE
VALID INTERNAL CONTROL?: YES

## 2020-02-06 NOTE — PROGRESS NOTES
[facility]  OFFICE PROCEDURE PROGRESS NOTE        Chart reviewed for the following:   I, Toni Richards, have reviewed the History, Physical and updated the Allergic reactions for [facility] performed immediately prior to start of procedure:   Mario Michael, have performed the following reviews on Any Tapia prior to the start of the procedure:            * Patient was identified by name and date of birth   * Agreement on procedure being performed was verified  * Risks and Benefits explained to the patient  * Procedure site verified and marked as necessary  * Patient was positioned for comfort  * Consent was signed and verified     Time: 1:38 PM        Date of procedure: 2/6/2020    Procedure performed by:  Ashok Morgan MD    How tolerated by patient: tolerated the procedure well with no complications    Post Procedural Pain Scale: 2 - Hurts Little Bit    Comments: none              KYLEENA INSERTION  Indications:  Any Tapia is a No obstetric history on file. ,  30 y.o. female [facility] [place][note 2] Patient's last menstrual period was 02/06/2020 (exact date). Her LMP was normal in duration and amount of flow. She  presents for insertion of an IUD. The risks, benefits and alternatives of IUD insertion were discussed in detail at last visit. She also has reviewed [place] information. She has elected to proceed with the insertion today and she states she has no further questions. A urine pregnancy test was negative   Procedure: The pelvic exam revealed normal external genitalia. On bimanual exam the uterus was anteverted and normal in size with no tenderness present. A speculum was inserted into the vagina and the cervix was visualized. The cervix was prepped with zephiran solution. The anterior lip of the cervix was grasped with a single toothed tenaculum. The uterus was sounded with a Elias sound to 6 centimeters.  Arlyne Apt IUD was then inserted without difficulty. The string was cut to 3 centimeters. She experienced a mild  amount of cramping. Post Procedure Status:   She tolerated the procedure with mild discomfort. The patient was observed for 10 minutes after the insertion. There were no complications. Patient was discharged in stable condition.   The patient received [place] lot number [facility]    Disc bleeding, infection, expulsion  Fu in 4 weeks with US

## 2020-02-08 LAB
CYTOLOGIST CVX/VAG CYTO: NORMAL
CYTOLOGY CVX/VAG DOC CYTO: NORMAL
CYTOLOGY CVX/VAG DOC THIN PREP: NORMAL
DX ICD CODE: NORMAL
LABCORP, 190119: NORMAL
Lab: NORMAL
OTHER STN SPEC: NORMAL
STAT OF ADQ CVX/VAG CYTO-IMP: NORMAL

## 2020-02-24 ENCOUNTER — OFFICE VISIT (OUTPATIENT)
Dept: OBGYN CLINIC | Age: 25
End: 2020-02-24

## 2020-02-24 VITALS
BODY MASS INDEX: 23 KG/M2 | SYSTOLIC BLOOD PRESSURE: 142 MMHG | HEIGHT: 62 IN | WEIGHT: 125 LBS | DIASTOLIC BLOOD PRESSURE: 83 MMHG

## 2020-02-24 DIAGNOSIS — R10.2 PELVIC PAIN: Primary | ICD-10-CM

## 2020-02-24 NOTE — LETTER
NOTIFICATION RETURN TO WORK / SCHOOL 
 
2/24/2020 11:59 AM 
 
Ms. Thom Ferraro [address] [id number] To Whom It May Concern: 
 
Thom Ferraro was seen in our office today 2/24/2020 for an appointment. If there are questions or concerns please have the patient contact our office. Sincerely, Alma Steen MD

## 2020-02-24 NOTE — PROGRESS NOTES
This is a follow-up visit for [facility] is a No obstetric history on file. ,  30 y.o. female [facility] whose Patient's last menstrual period was 02/06/2020 (exact date). was on . She had an [place] IUD placed 2/6/2020    Since the IUD placement, the patient has not had any unusual complaints. She has had some moderate non-menstrual bleeding. She describes having a moderate amount of blood-tinged discharge which has occurred off and on since insertion of the Mirena. She has not had significant pain, moderate cramping. She has had no fever. Associated signs and symptoms: she denies dyspareunia, expulsion, heavy bleeding, increased pain, fever, and pelvic pain. Ultrasound was done today and revealed appropriate placement of the IUD in the endometrial cavity. TRANSVAGINAL ULTRASOUND PERFORMED  UTERUS IS ANTEVERTED, NORMAL IN SIZE AND ECHOGENICITY. ENDOMETRIUM MEASURES 3-4MM IN THICKNESS. NO EVIDENCE OF MASSES OR ABNORMALITIES ARE SEEN. IUD IS SEEN IN THE PROPER POSITION WITHIN THE ENDOMETRIAL CAVITY IN THE UTERINE FUNDUS. RIGHT OVARY APPEARS WITHIN NORMAL LIMITS. LEFT OVARY APPEARS WITHIN NORMAL LIMITS. BILATERAL MULTIFOLLICULAR OVARIES. NO FREE FLUID SEEN IN THE CDS. 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   Social History     Occupational History    Not on file   Tobacco Use    Smoking status: Never Smoker    Smokeless tobacco: Never Used   Substance and Sexual Activity    Alcohol use: Never     Frequency: Never    Drug use: Never    Sexual activity: Yes     Partners: Male     Birth control/protection: None     Family History   Problem Relation Age of Onset    Stroke Maternal Grandfather     Cancer Neg Hx     Diabetes Neg Hx     Heart Disease Neg Hx        Allergies   Allergen Reactions    Sulfa (Sulfonamide Antibiotics) Hives     11/8/19 \"Extreme hives\" per Mother     Prior to Admission medications    Medication Sig Start Date End Date Taking? Authorizing Provider   acetaminophen (TYLENOL) 325 mg tablet Take 325 mg by mouth every four (4) hours as needed for Pain.    Yes Provider, Historical        Review of Systems: History obtained from the patient  Constitutional: negative for weight loss, fever, night sweats  Breast: negative for breast lumps, nipple discharge, galactorrhea  GI: negative for change in bowel habits, abdominal pain, black or bloody stools  : negative for frequency, dysuria, hematuria, vaginal discharge  MSK: negative for back pain, joint pain, muscle pain  Skin: negative for itching, rash, hives  Psych: negative for anxiety, depression, change in mood      Objective:  Visit Vitals  /83   Ht 5' 2\" (1.575 m)   Wt 125 lb (56.7 kg)   LMP 02/06/2020 (Exact Date)   BMI 22.86 kg/m²       Physical Exam:   PHYSICAL EXAMINATION    Constitutional  · Appearance: well-nourished, well developed, alert, in no acute distress    Gastrointestinal  · Abdominal Examination: abdomen non-tender to palpation, normal bowel sounds, no masses present  · Liver and spleen: no hepatomegaly present, spleen not palpable  · Hernias: no hernias identified    Genitourinary  · External Genitalia: normal appearance for age, no discharge present, no tenderness present, no inflammatory lesions present, no masses present, no atrophy present  · Vagina: normal vaginal vault without central or paravaginal defects, no discharge present, no inflammatory lesions present, no masses present  · Bladder: non-tender to palpation  · Urethra: appears normal  · Cervix: normal with IUD string visible and appropriate length   · Uterus: normal size, shape and consistency  · Adnexa: no adnexal tenderness present, no adnexal masses present  · Perineum: perineum within normal limits, no evidence of trauma, no rashes or skin lesions present    Skin  · General Inspection: no rash, no lesions identified    Neurologic/Psychiatric  · Mental Status:  · Orientation: grossly oriented to person, place and time  · Mood and Affect: mood normal, affect appropriate    Assessment:     Some cramping with IUD now resolved  IUD in correct location  Plan:     AE

## 2020-02-24 NOTE — PATIENT INSTRUCTIONS
Learning About Birth Control: Intrauterine Device (IUD)  What is an intrauterine device (IUD)? The intrauterine device (IUD) is used to prevent pregnancy. It's a small, plastic, T-shaped device. Your doctor places the IUD in your uterus. You have a choice between a hormonal IUD and a copper IUD. The hormonal IUD can prevent pregnancy for 3 to 5 years, depending on which IUD is used. Once you have it, you don't have to do anything else to prevent pregnancy. The copper IUD can prevent pregnancy for 10 years. Once you have it, you don't have to do anything else to prevent pregnancy. A string tied to the end of the IUD hangs down through the opening of the uterus (called the cervix) into the vagina. You can check that the IUD is in place by feeling for the string. The IUD usually stays in the uterus until your doctor removes it. How well does it work? In the first year of use:  · When the hormonal IUD is used exactly as directed, fewer than 1 woman out of 100 has an unplanned pregnancy. · When the copper IUD is used exactly as directed, fewer than 1 woman out of 100 has an unplanned pregnancy. Be sure to tell your doctor about any health problems you have or medicines you take. He or she can help you choose the birth control method that is right for you. What are the advantages of an IUD? · An IUD is one of the most effective methods of birth control. · It prevents pregnancy for 3 to 10 years, depending on the type. You don't have to worry about birth control during this time. · It's safe to use while breastfeeding. · IUDs don't contain estrogen. So you can use an IUD if you don't want to take estrogen or can't take estrogen because you have certain health problems or concerns. · An IUD is convenient. It is always providing birth control. You don't need to remember to take a pill or get a shot. You don't have to interrupt sex to protect against pregnancy.   · A hormonal IUD may reduce heavy bleeding and cramping. What are the disadvantages of an IUD? · An IUD doesn't protect against sexually transmitted infections (STIs), such as herpes or HIV/AIDS. If you aren't sure if your sex partner might have an STI, use a condom to protect against disease. · A copper IUD may cause periods with more bleeding and cramping. · You have to see a doctor to have an IUD inserted and removed. · You have to check to see if the string is in place. Where can you learn more? Go to http://avelino-rony.info/. Enter U284 in the search box to learn more about \"Learning About Birth Control: Intrauterine Device (IUD). \"  Current as of: May 29, 2019  Content Version: 12.2  © 7321-3960 [employer]. Care instructions adapted under license by [employer] (which disclaims liability or warranty for this information). If you have questions about a medical condition or this instruction, always ask your healthcare professional. [address] any warranty or liability for your use of this information.

## 2020-05-18 ENCOUNTER — OFFICE VISIT (OUTPATIENT)
Dept: OBGYN CLINIC | Age: 25
End: 2020-05-18

## 2020-05-18 DIAGNOSIS — Z30.431 IUD CHECK UP: ICD-10-CM

## 2020-05-18 DIAGNOSIS — N92.6 IRREGULAR BLEEDING: Primary | ICD-10-CM

## 2020-05-18 RX ORDER — FLUCONAZOLE 150 MG/1
150 TABLET ORAL DAILY
Qty: 1 TAB | Refills: 1 | Status: SHIPPED | OUTPATIENT
Start: 2020-05-18 | End: 2020-10-02

## 2020-05-18 NOTE — PROGRESS NOTES
This is a follow-up visit for Celine Schirmer is a No obstetric history on file. ,  30 y.o. female [facility] whose No LMP recorded. (Menstrual status: IUD). was on . She had an Mirena IUD placed 2/6/2020. Since the IUD placement, the patient has not had any unusual complaints. She has had some mild non-menstrual bleeding. She describes having a none for the past  months amount of blood-tinged discharge which has occurred off and on since insertion of the [facility]  She has not significant  pain. She feels discomfort with tampon and worries she is pulling the IUD  She has had no fever. Associated signs and symptoms: she denies dyspareunia, expulsion, heavy bleeding, increased pain, fever, and pelvic pain. Ultrasound was done today and revealed appropriate placement of the IUD in the endometrial cavity. UTERUS IS ANTEVERTED, NORMAL IN SIZE AND ECHOGENICITY. ENDOMETRIUM MEASURES 3-4MM IN THICKNESS. NO EVIDENCE OF MASS OR ABNORMALITY SEEN  WITHIN THE ENDOMETRIAL CAVITY. IUD IS SEEN IN THE PROPER POSITION WITHIN THE ENDOMETRIAL CAVITY IN THE UTERINE FUNDUS. RIGHT OVARY APPEARS WITHIN NORMAL LIMITS. LEFT OVARY APPEARS WITHIN NORMAL LIMITS. NO FREE FLUID SEEN IN THE CDS. 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 [address] 11/8/2019    Pleural effusion on right 12/3/2019    Routine Papanicolaou smear 2/4/2020 neg     No past surgical history on file.   Social History     Occupational History    Not on file   Tobacco Use    Smoking status: Never Smoker    Smokeless tobacco: Never Used   Substance and Sexual Activity    Alcohol use: Never     Frequency: Never    Drug use: Never    Sexual activity: Yes     Partners: Male     Birth control/protection: None     Family History   Problem Relation Age of Onset    Stroke Maternal Grandfather     Cancer Neg Hx     Diabetes Neg Hx     Heart Disease Neg Hx        Allergies   Allergen Reactions    Sulfa (Sulfonamide Antibiotics) Hives     11/8/19 \"Extreme hives\" per Mother     Prior to Admission medications    Medication Sig Start Date End Date Taking? Authorizing Provider   acetaminophen (TYLENOL) 325 mg tablet Take 325 mg by mouth every four (4) hours as needed for Pain. Provider, Historical        Review of Systems: History obtained from the patient  Constitutional: negative for weight loss, fever, night sweats  Breast: negative for breast lumps, nipple discharge, galactorrhea  GI: negative for change in bowel habits, abdominal pain, black or bloody stools  : negative for frequency, dysuria, hematuria, vaginal discharge  MSK: negative for back pain, joint pain, muscle pain  Skin: negative for itching, rash, hives  Psych: negative for anxiety, depression, change in mood      Objective: There were no vitals taken for this visit.     Physical Exam:   PHYSICAL EXAMINATION    Constitutional  · Appearance: well-nourished, well developed, alert, in no acute distress    Gastrointestinal  · Abdominal Examination: abdomen non-tender to palpation, normal bowel sounds, no masses present  · Liver and spleen: no hepatomegaly present, spleen not palpable  · Hernias: no hernias identified    Genitourinary  · External Genitalia: normal appearance for age, no discharge present, no tenderness present, no inflammatory lesions present, no masses present, no atrophy present  · Vagina: normal vaginal vault without central or paravaginal defects, white clumpy discharge present, no inflammatory lesions present, no masses present  · Bladder: non-tender to palpation  · Urethra: appears normal  · Cervix: normal with IUD string visible and appropriate length   · Uterus: normal size, shape and consistency  · Adnexa: no adnexal tenderness present, no adnexal masses present  · Perineum: perineum within normal limits, no evidence of trauma, no rashes or skin lesions present    Skin  · General Inspection: no rash, no lesions identified    Neurologic/Psychiatric  · Mental Status:  · Orientation: grossly oriented to person, place and time  · Mood and Affect: mood normal, affect appropriate    Assessment:   IUD looks good  Yeast vaginitis  Plan:   Reassured she cannot pull out IUD with tampon  Will send diflucan for yeast

## 2020-10-02 ENCOUNTER — OFFICE VISIT (OUTPATIENT)
Dept: URGENT CARE | Age: 25
End: 2020-10-02
Payer: COMMERCIAL

## 2020-10-02 VITALS — TEMPERATURE: 98.7 F | HEART RATE: 81 BPM | RESPIRATION RATE: 18 BRPM | OXYGEN SATURATION: 99 %

## 2020-10-02 DIAGNOSIS — Z20.822 CLOSE EXPOSURE TO 2019 NOVEL CORONAVIRUS: Primary | ICD-10-CM

## 2020-10-02 PROCEDURE — 99203 OFFICE O/P NEW LOW 30 MIN: CPT | Performed by: FAMILY MEDICINE

## 2020-10-02 NOTE — PROGRESS NOTES
This patient was seen in [facility] at [address] [place] [address] Urgent Care while in their vehicle due to COVID-19 pandemic with PPE and focused examination in order to decrease community viral transmission. The patient/guardian gave verbal consent to treat. Thom Ferraro is a 30 y.o. female who presents for COVID-19 testing. Was exposed to COVID-19 by boyfriend. Denies cough, fever, SOB. PMH: Lemierre syndrome. Non-smoker. The history is provided by the patient. 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       Family History   Problem Relation Age of Onset    Stroke Maternal Grandfather     Cancer Neg Hx     Diabetes Neg Hx     Heart Disease Neg Hx         Social History     Socioeconomic History    Marital status: SINGLE     Spouse name: Not on file    Number of children: Not on file    Years of education: Not on file    Highest education level: Not on file   Occupational History    Not on file   Social Needs    Financial resource strain: Not on file    Food insecurity     Worry: Not on file     Inability: Not on file    Transportation needs     Medical: Not on file     Non-medical: Not on file   Tobacco Use    Smoking status: Never Smoker    Smokeless tobacco: Never Used   Substance and Sexual Activity    Alcohol use: Never     Frequency: Never    Drug use: Never    Sexual activity: Yes     Partners: Male     Birth control/protection: None   Lifestyle    Physical activity     Days per week: Not on file     Minutes per session: Not on file    Stress: Not on file   Relationships    Social connections     Talks on phone: Not on file     Gets together: Not on file     Attends Confucianist service: Not on file     Active member of club or organization: Not on file     Attends meetings of clubs or organizations: Not on file     Relationship status: Not on file    Intimate partner violence     Fear of current or ex partner: Not on file     Emotionally abused: Not on file     Physically abused: Not on file     Forced sexual activity: Not on file   Other Topics Concern    Not on file   Social History Narrative    Not on file                ALLERGIES: Sulfa (sulfonamide antibiotics)    Review of Systems   Constitutional: Negative for activity change, appetite change, chills and fever. HENT: Negative for congestion, rhinorrhea and sore throat. Respiratory: Negative for cough, shortness of breath and wheezing. Cardiovascular: Negative for chest pain. Gastrointestinal: Negative for abdominal pain, diarrhea, nausea and vomiting. Musculoskeletal: Negative for myalgias. Neurological: Negative for headaches. Vitals:    10/02/20 1453   Pulse: 81   Resp: 18   Temp: 98.7 °F (37.1 °C)   SpO2: 99%       Physical Exam  Vitals signs and nursing note reviewed. Constitutional:       General: She is not in acute distress. Appearance: She is well-developed. She is not diaphoretic. Pulmonary:      Effort: Pulmonary effort is normal. No respiratory distress. Breath sounds: Normal breath sounds. No stridor. No wheezing, rhonchi or rales. Neurological:      Mental Status: She is alert. Psychiatric:         Behavior: Behavior normal.         Thought Content:  Thought content normal.         Judgment: Judgment normal.         MDM    ICD-10-CM ICD-9-CM   1. Close exposure to 2019 novel coronavirus  Z20.828 V01.79       Orders Placed This Encounter    NOVEL CORONAVIRUS (COVID-19)     Scheduling Instructions:      1) Due to current limited availability of the COVID-19 PCR test, tests will be prioritized and may not be completed.              2) Order only if the test result will change clinical management or necessary for a return to mission-critical employment decision.              3) Print and instruct patient to adhere to CDC home isolation program. (Link Above)              4) Set up or refer patient for a monitoring program.              5) Have patient sign up for and leverage MyChart (if not previously done). Order Specific Question:   Is this test for diagnosis or screening? Answer:   Screening     Order Specific Question:   Symptomatic for COVID-19 as defined by CDC? Answer:   No     Order Specific Question:   Hospitalized for COVID-19? Answer:   No     Order Specific Question:   Admitted to ICU for COVID-19? Answer:   No     Order Specific Question:   Employed in healthcare setting? Answer:   No     Order Specific Question:   Resident in a congregate (group) care setting? Answer:   No     Order Specific Question:   Pregnant? Answer:   No     Order Specific Question:   Previously tested for COVID-19? Answer:   No        Self Quarantine x 14 days  Deep breathing exercises  Tylenol prn  Increase fluids    If signs and symptoms become worse the pt is to go to the ER.          Procedures

## 2020-10-04 LAB — SARS-COV-2, NAA: NOT DETECTED

## 2020-10-07 ENCOUNTER — OFFICE VISIT (OUTPATIENT)
Dept: URGENT CARE | Age: 25
End: 2020-10-07
Payer: COMMERCIAL

## 2020-10-07 VITALS — HEART RATE: 79 BPM | RESPIRATION RATE: 14 BRPM | OXYGEN SATURATION: 98 % | TEMPERATURE: 99 F

## 2020-10-07 DIAGNOSIS — R50.9 LOW GRADE FEVER: ICD-10-CM

## 2020-10-07 DIAGNOSIS — Z20.822 CLOSE EXPOSURE TO 2019 NOVEL CORONAVIRUS: ICD-10-CM

## 2020-10-07 DIAGNOSIS — J02.9 SORE THROAT: Primary | ICD-10-CM

## 2020-10-07 DIAGNOSIS — R52 BODY ACHES: ICD-10-CM

## 2020-10-07 PROCEDURE — 99213 OFFICE O/P EST LOW 20 MIN: CPT | Performed by: FAMILY MEDICINE

## 2020-10-07 NOTE — PROGRESS NOTES
This patient was seen in [facility] at [address] [place] [address] Urgent Care while in their vehicle due to COVID-19 pandemic with PPE and focused examination in order to decrease community viral transmission. The patient/guardian gave verbal consent to treat. Octavio Valadez is a 30 y.o. female who presents with ST, runny nose, chills, low grade temp since yesterday. Has had HA for the past 5 days. Was exposed to COVID-19 by boyfriend. Was seen here 6 days ago and tested negative for COVID-19, but had no sx at the time. Denies cough,  SOB. PMH: thrombocytopenia. Non-smoker. The history is provided by the patient. 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 No pertinent surgical history.       Family History   Problem Relation Age of Onset    Stroke Maternal Grandfather     Cancer Neg Hx     Diabetes Neg Hx     Heart Disease Neg Hx         Social History     Socioeconomic History    Marital status: SINGLE     Spouse name: Not on file    Number of children: Not on file    Years of education: Not on file    Highest education level: Not on file   Occupational History    Not on file   Social Needs    Financial resource strain: Not on file    Food insecurity     Worry: Not on file     Inability: Not on file    Transportation needs     Medical: Not on file     Non-medical: Not on file   Tobacco Use    Smoking status: Never Smoker    Smokeless tobacco: Never Used   Substance and Sexual Activity    Alcohol use: Never     Frequency: Never    Drug use: Never    Sexual activity: Yes     Partners: Male     Birth control/protection: None   Lifestyle    Physical activity     Days per week: Not on file     Minutes per session: Not on file    Stress: Not on file   Relationships    Social connections     Talks on phone: Not on file     Gets together: Not on file     Attends Yarsanism service: Not on file     Active member of club or organization: Not on file     Attends meetings of clubs or organizations: Not on file     Relationship status: Not on file    Intimate partner violence     Fear of current or ex partner: Not on file     Emotionally abused: Not on file     Physically abused: Not on file     Forced sexual activity: Not on file   Other Topics Concern    Not on file   Social History Narrative    Not on file                ALLERGIES: Sulfa (sulfonamide antibiotics)    Review of Systems   Constitutional: Positive for fever. Negative for activity change, appetite change and chills. HENT: Positive for rhinorrhea and sore throat. Negative for congestion. Respiratory: Negative for cough, shortness of breath and wheezing. Cardiovascular: Negative for chest pain. Gastrointestinal: Negative for abdominal pain, diarrhea, nausea and vomiting. Musculoskeletal: Positive for myalgias. Neurological: Positive for headaches. Vitals:    10/07/20 1348   Pulse: 79   Resp: 14   Temp: 99 °F (37.2 °C)   SpO2: 98%       Physical Exam  Vitals signs and nursing note reviewed. Constitutional:       General: She is not in acute distress. Appearance: She is well-developed. She is not diaphoretic. HENT:      Mouth/Throat:      Mouth: Mucous membranes are moist.      Pharynx: Oropharynx is clear. No oropharyngeal exudate or posterior oropharyngeal erythema. Pulmonary:      Effort: Pulmonary effort is normal. No respiratory distress. Breath sounds: Normal breath sounds. No stridor. No wheezing, rhonchi or rales. Neurological:      Mental Status: She is alert. Psychiatric:         Behavior: Behavior normal.         Thought Content: Thought content normal.         Judgment: Judgment normal.         [facility]    ICD-10-CM ICD-9-CM   1.  Sore throat  J02.9 462 2. Body aches  R52 780.96   3. Low grade fever  R50.9 780.60   4. Close exposure to 2019 novel coronavirus  Z20.828 V01.79       Orders Placed This Encounter    NOVEL CORONAVIRUS (COVID-19)     Scheduling Instructions:      1) Due to current limited availability of the COVID-19 PCR test, tests will be prioritized and may not be completed.              2) Order only if the test result will change clinical management or necessary for a return to mission-critical employment decision.              3) Print and instruct patient to adhere to CDC home isolation program. (Link Above)              4) Set up or refer patient for a monitoring program.              5) Have patient sign up for and leverage hyperWALLET Systemshart (if not previously done). Order Specific Question:   Is this test for diagnosis or screening? Answer:   Diagnosis of ill patient     Order Specific Question:   Symptomatic for COVID-19 as defined by CDC? Answer:   Yes     Order Specific Question:   Date of Symptom Onset     Answer:   10/6/2020     Order Specific Question:   Hospitalized for COVID-19? Answer:   No     Order Specific Question:   Admitted to ICU for COVID-19? Answer:   No     Order Specific Question:   Employed in healthcare setting? Answer:   No     Order Specific Question:   Resident in a congregate (group) care setting? Answer:   No     Order Specific Question:   Pregnant? Answer:   No     Order Specific Question:   Previously tested for COVID-19? Answer:   Yes      Self Quarantine  Deep breathing exercises, ambulation  Tylenol prn  Increase fluids    If signs and symptoms become worse the pt is to go to the ER.          Procedures

## 2020-10-09 LAB — SARS-COV-2, NAA: NOT DETECTED

## 2020-11-16 ENCOUNTER — TELEPHONE (OUTPATIENT)
Dept: FAMILY MEDICINE CLINIC | Age: 25
End: 2020-11-16

## 2020-11-16 NOTE — TELEPHONE ENCOUNTER
Spoke to Delfino. The pt was seen on 12/10/2019 @ [facility] for hospital f/u. They had called for a referral.  they never got it so they are requesting one now. It was with DR Randolph Rosario for abnormal finding on radiology. R93.89  The pt's insurance was [employer]  ID # [id number]  Group [id number]    [facility] for return call. Left another message for Delfino. I gave her my direct #.    ----- Message from [place] sent at 11/13/2020 11:47 AM EST -----  Regarding: Dr. Rosa Isela Crawley Message/Vendor Calls    Caller's first and last name: Delfino with [facility]      Reason for call: Requesting a call back to discuss insurance referral that was requested December 2019.        Callback required yes/no and why: yes      Best contact number(s):[phone]      Details to clarify the request: [facility]

## 2020-12-11 NOTE — TELEPHONE ENCOUNTER
----- Message from [place] sent at 12/11/2020  8:08 AM EST -----  Regarding: Dr. Kitty Kim Message/Vendor Calls    Caller's first and last name: Viridiana Ventura with [facility]      Reason for call: Requesting a call back from Tyson Yanes or Angela Austin to check the status of a [facility] referral for DOS 12/10/2019.        Callback required yes/no and why: yes      Best contact number(s): [phone]      Details to clarify the request: [address]

## 2021-09-09 ENCOUNTER — VIRTUAL VISIT (OUTPATIENT)
Dept: FAMILY MEDICINE CLINIC | Age: 26
End: 2021-09-09
Payer: COMMERCIAL

## 2021-09-09 DIAGNOSIS — R20.0 LEFT LEG NUMBNESS: Primary | ICD-10-CM

## 2021-09-09 PROCEDURE — 99213 OFFICE O/P EST LOW 20 MIN: CPT | Performed by: FAMILY MEDICINE

## 2021-09-09 NOTE — PROGRESS NOTES
Gideon Llanos  27 y.o. female  1995  [address]  [id number]     LINDA Constantino       Encounter Date: 9/9/2021           Established Patient Visit Note: Gunnar Perla MD    Reason for Appointment:  Chief Complaint   Patient presents with    Numbness       History of Present Illness:  History provided by patient    Gideon Llanos is a 27 y.o. female who presents today for:    Leg Numbness  [place]: Monday before last  Left Shin numbness; she reports that she can feel that she is touching it but it feels like she is touching someone elses leg  She denies any assoicated pain, redness, or swelling of the leg  She denies any associated injury  She reports that she woke up and she was walking her dog. She states that it felt weird while walking, and yosvany she touched it the sensaton was deminished. She denies any problems with her back or other symptoms  She reports that she has never had anything like this happen before  Course: she reports that it has started feeling a little better. She has noticied that it feels like the numbness has moved since it started. She is not currently taking any medicatons  She reports that she has full strength of the leg  She denies any tingling sensation of the leg    Review of Systems  Review of Systems   Constitutional: Negative for chills and fever. Respiratory: Negative for cough, shortness of breath and wheezing. Cardiovascular: Negative for chest pain and palpitations. Allergies: Sulfa (sulfonamide antibiotics)    Medications: (Updated to reflect final medication list after visit)  No current outpatient medications on file.     History  Patient Care Team:  Dai Townsend MD as PCP - General (Family Medicine)  Dai Townsend MD as PCP - [address] [place] [address] [facility]  Pati Marques MD as Physician (Infectious Disease)    Past Medical History: she has a past medical history of IUD (intrauterine device) in place (02/06/2020), Lemierre syndrome (12/3/2019), Lung abscess ([facility] [address] (11/8/2019), Pleural effusion on right (12/3/2019), and Routine Papanicolaou smear (2/4/2020 neg). Past Surgical History: she has no past surgical history on file. Family Medical History: family history includes Stroke in her maternal grandfather. Social History: she reports that she has never smoked. She has never used smokeless tobacco. She reports that she does not drink alcohol and does not use drugs. Objective:     Physical Exam    Constitutional:       General: Not in acute distress. Appearance: Normal appearance. Not ill-appearing,  Not toxic-appearing. HENT:      Head: Normocephalic. Right Ear: External ear normal.      Left Ear: External ear normal.      Nose: Nose normal.   Eyes:      General: No scleral icterus. Right eye: No discharge. Left eye: No discharge. Extraocular Movements: Extraocular movements intact. Conjunctiva/sclera: Conjunctivae normal.   Neck:      Musculoskeletal: Normal range of motion. Pulmonary:      Effort: Pulmonary effort is normal. No respiratory distress. Neurological:      Mental Status: Mental status is at baseline. Psychiatric:         Mood and Affect: Mood normal.         Behavior: Behavior normal.         Thought Content: Thought content normal.         Judgment: Judgment normal.     Patient is able to stand on her tippy toes    Assessment & Plan:      ICD-10-CM ICD-9-CM    1.  Left leg numbness  R20.0 782.0 REFERRAL TO NEUROLOGY      CBC W/O DIFF      METABOLIC PANEL, COMPREHENSIVE      TSH 3RD GENERATION      GAMMOPATHY EVAL, SPEP/CHRIS, IG QT/FLC      MAGNESIUM      LIPID PANEL      T4, FREE      HEMOGLOBIN A1C WITH EAG      CBC W/O DIFF      METABOLIC PANEL, COMPREHENSIVE      TSH 3RD GENERATION      GAMMOPATHY EVAL, SPEP/CHRIS, IG QT/FLC      MAGNESIUM      LIPID PANEL      T4, FREE      HEMOGLOBIN A1C WITH EAG       Left Leg Numbness: New problem, uncontrolled, check labs and referral to neurology for further evaluation. Discussed red flag symptoms and reasons to call or go to ED      I was in the office while conducting this encounter. Consent:  She and/or her healthcare decision maker is aware that this patient-initiated Telehealth encounter is a billable service, with coverage as determined by her insurance carrier. She is aware that she may receive a bill and has provided verbal consent to proceed: Yes    This virtual visit was conducted via [employer] [place]. Pursuant to the emergency declaration under the [facility]note 2[facility] waiver authority and the [employer] Act, this Virtual  Visit was conducted to reduce the patient's risk of exposure to COVID-19 and provide continuity of care for an established patient. Services were provided through a video synchronous discussion virtually to substitute for in-person clinic visit. Due to this being a TeleHealth evaluation, many elements of the physical examination are unable to be assessed. Total Time: minutes: 21-30 minutes. I have discussed the diagnosis with the patient and the intended plan as seen in the above orders. The patient has received an after-visit summary along with patient information handout. I have discussed medication side effects and warnings with the patient as well. Disposition  Follow-up and Dispositions    · Return in about 4 weeks (around 10/7/2021).            Paul Dhillon MD

## 2021-09-10 ENCOUNTER — APPOINTMENT (OUTPATIENT)
Dept: FAMILY MEDICINE CLINIC | Age: 26
End: 2021-09-10

## 2021-09-14 LAB
ALBUMIN SERPL ELPH-MCNC: 4.2 G/DL (ref 2.9–4.4)
ALBUMIN SERPL-MCNC: 4.8 G/DL (ref 3.9–5)
ALBUMIN/GLOB SERPL: 1.4 {RATIO} (ref 0.7–1.7)
ALBUMIN/GLOB SERPL: 1.9 {RATIO} (ref 1.2–2.2)
ALP SERPL-CCNC: 80 IU/L (ref 48–121)
ALPHA1 GLOB SERPL ELPH-MCNC: 0.3 G/DL (ref 0–0.4)
ALPHA2 GLOB SERPL ELPH-MCNC: 0.7 G/DL (ref 0.4–1)
ALT SERPL-CCNC: 12 IU/L (ref 0–32)
AST SERPL-CCNC: 13 IU/L (ref 0–40)
B-GLOBULIN SERPL ELPH-MCNC: 0.9 G/DL (ref 0.7–1.3)
BILIRUB SERPL-MCNC: 0.3 MG/DL (ref 0–1.2)
BUN SERPL-MCNC: 18 MG/DL (ref 6–20)
BUN/CREAT SERPL: 23 (ref 9–23)
CALCIUM SERPL-MCNC: 9.7 MG/DL (ref 8.7–10.2)
CHLORIDE SERPL-SCNC: 104 MMOL/L (ref 96–106)
CHOLEST SERPL-MCNC: 162 MG/DL (ref 100–199)
CO2 SERPL-SCNC: 23 MMOL/L (ref 20–29)
CREAT SERPL-MCNC: 0.79 MG/DL (ref 0.57–1)
ERYTHROCYTE [DISTWIDTH] IN BLOOD BY AUTOMATED COUNT: 12 % (ref 11.7–15.4)
EST. AVERAGE GLUCOSE BLD GHB EST-MCNC: 100 MG/DL
GAMMA GLOB SERPL ELPH-MCNC: 1.2 G/DL (ref 0.4–1.8)
GLOBULIN SER CALC-MCNC: 2.5 G/DL (ref 1.5–4.5)
GLOBULIN SER-MCNC: 3.1 G/DL (ref 2.2–3.9)
GLUCOSE SERPL-MCNC: 99 MG/DL (ref 65–99)
HBA1C MFR BLD: 5.1 % (ref 4.8–5.6)
HCT VFR BLD AUTO: 42 % (ref 34–46.6)
HDLC SERPL-MCNC: 64 MG/DL
HGB BLD-MCNC: 13.9 G/DL (ref 11.1–15.9)
IGA SERPL-MCNC: 115 MG/DL (ref 87–352)
IGG SERPL-MCNC: 1176 MG/DL (ref 586–1602)
IGM SERPL-MCNC: 107 MG/DL (ref 26–217)
IMP & REVIEW OF LAB RESULTS: NORMAL
INTERPRETATION SERPL IEP-IMP: NORMAL
KAPPA LC FREE SER-MCNC: 12.6 MG/L (ref 3.3–19.4)
KAPPA LC FREE/LAMBDA FREE SER: 1.58 {RATIO} (ref 0.26–1.65)
LAMBDA LC FREE SERPL-MCNC: 8 MG/L (ref 5.7–26.3)
LDLC SERPL CALC-MCNC: 85 MG/DL (ref 0–99)
M PROTEIN SERPL ELPH-MCNC: NORMAL G/DL
MAGNESIUM SERPL-MCNC: 2 MG/DL (ref 1.6–2.3)
MCH RBC QN AUTO: 29.6 PG (ref 26.6–33)
MCHC RBC AUTO-ENTMCNC: 33.1 G/DL (ref 31.5–35.7)
MCV RBC AUTO: 90 FL (ref 79–97)
PLATELET # BLD AUTO: 300 X10E3/UL (ref 150–450)
PLEASE NOTE:, 149534: NORMAL
POTASSIUM SERPL-SCNC: 4.5 MMOL/L (ref 3.5–5.2)
PROT SERPL-MCNC: 7.3 G/DL (ref 6–8.5)
RBC # BLD AUTO: 4.69 X10E6/UL (ref 3.77–5.28)
SODIUM SERPL-SCNC: 140 MMOL/L (ref 134–144)
T4 FREE SERPL-MCNC: 1.27 NG/DL (ref 0.82–1.77)
TRIGL SERPL-MCNC: 66 MG/DL (ref 0–149)
TSH SERPL DL<=0.005 MIU/L-ACNC: 5.3 UIU/ML (ref 0.45–4.5)
VLDLC SERPL CALC-MCNC: 13 MG/DL (ref 5–40)
WBC # BLD AUTO: 9.6 X10E3/UL (ref 3.4–10.8)

## 2022-03-18 PROBLEM — J85.2 LUNG ABSCESS (HCC): Status: ACTIVE | Noted: 2019-11-08

## 2022-03-18 PROBLEM — D69.6 THROMBOCYTOPENIA (HCC): Status: ACTIVE | Noted: 2019-10-30

## 2022-03-19 PROBLEM — J90 PLEURAL EFFUSION ON RIGHT: Status: ACTIVE | Noted: 2019-12-03

## 2022-03-19 PROBLEM — I80.8 LEMIERRE SYNDROME: Status: ACTIVE | Noted: 2019-12-03

## 2022-03-19 PROBLEM — J02.9 LEMIERRE SYNDROME: Status: ACTIVE | Noted: 2019-12-03

## 2022-05-06 NOTE — PATIENT INSTRUCTIONS
Intrauterine Device (IUD) Insertion: Care Instructions  Your Care Instructions    The intrauterine device (IUD) is a very effective method of birth control. It is a small, plastic, T-shaped device that contains copper or hormones. The doctor inserts the IUD into your uterus. A plastic string tied to the end of the IUD hangs down through the cervix into the vagina. There are two types of IUDs. The copper IUD is effective for up to 10 years. The hormonal IUD is effective for either 3 years or 5 years, depending on which IUD is used. The hormonal IUD also reduces menstrual bleeding and cramping. Both types of IUD damage or kill the man's sperm. This means that the woman's egg does not join with the sperm. IUDs also change the lining of the uterus so that the egg does not lodge there. The IUD is most likely to work well for women who have been pregnant before. Some women who have never been pregnant have more trouble keeping the IUD in the uterus. They also may have more pain and cramping after insertion. Follow-up care is a key part of your treatment and safety. Be sure to make and go to all appointments, and call your doctor if you are having problems. It's also a good idea to know your test results and keep a list of the medicines you take. How can you care for yourself at home? · You may experience some mild cramping and light bleeding (spotting) for 1 or 2 days. Use a hot water bottle or a heating pad set on low on your belly for pain. · Take an over-the-counter pain medicine, such as acetaminophen (Tylenol), ibuprofen (Advil, Motrin), and naproxen (Aleve) if needed. Read and follow all instructions on the label. · Do not take two or more pain medicines at the same time unless the doctor told you to. Many pain medicines have acetaminophen, which is Tylenol. Too much acetaminophen (Tylenol) can be harmful. · Check the string of your IUD after every period.  To do this, insert a finger into your vagina and feel for the cervix, which is at the top of the vagina and feels harder than the rest of your vagina. You should be able to feel the thin, plastic string coming out of the opening of your cervix. If you cannot feel the string, use another form of birth control and make an appointment with your doctor to have the string checked. · If the IUD comes out, save it and call your doctor. Be sure to use another form of birth control while the IUD is out. · Use latex condoms to protect against sexually transmitted infections (STIs), such as gonorrhea and chlamydia. An IUD does not protect you from STIs. Having one sex partner (who does not have STIs and does not have sex with anyone else) is a good way to avoid STIs. When should you call for help? Call 911 anytime you think you may need emergency care. For example, call if:    · You passed out (lost consciousness).     · You have sudden, severe pain in your belly or pelvis.    Call your doctor now or seek immediate medical care if:    · You have new belly or pelvic pain.     · You have severe vaginal bleeding. This means that you are soaking through your usual pads or tampons each hour for 2 or more hours.     · You are dizzy or lightheaded, or you feel like you may faint.     · You have a fever and pelvic pain or vaginal discharge.     · You have pelvic pain that is getting worse.    Watch closely for changes in your health, and be sure to contact your doctor if:    · You cannot feel the string, or the IUD comes out.     · You feel sick to your stomach, or you vomit.     · You think you may be pregnant. Where can you learn more? Go to http://avelino-rony.info/. Enter A458 in the search box to learn more about \"Intrauterine Device (IUD) Insertion: Care Instructions. \"  Current as of: May 29, 2019  Content Version: 12.2  © 6162-7882 [employer].  Care instructions adapted under license by [employer] (which disclaims liability or warranty for this information). If you have questions about a medical condition or this instruction, always ask your healthcare professional. [place] [address] any warranty or liability for your use of this information. none